# Patient Record
Sex: MALE | Race: WHITE | NOT HISPANIC OR LATINO | Employment: OTHER | URBAN - METROPOLITAN AREA
[De-identification: names, ages, dates, MRNs, and addresses within clinical notes are randomized per-mention and may not be internally consistent; named-entity substitution may affect disease eponyms.]

---

## 2021-06-25 ENCOUNTER — APPOINTMENT (OUTPATIENT)
Dept: GENERAL RADIOLOGY | Facility: HOSPITAL | Age: 81
End: 2021-06-25

## 2021-06-25 ENCOUNTER — HOSPITAL ENCOUNTER (EMERGENCY)
Facility: HOSPITAL | Age: 81
Discharge: HOME | End: 2021-06-25
Attending: EMERGENCY MEDICINE | Admitting: EMERGENCY MEDICINE

## 2021-06-25 VITALS
SYSTOLIC BLOOD PRESSURE: 168 MMHG | OXYGEN SATURATION: 100 % | DIASTOLIC BLOOD PRESSURE: 82 MMHG | WEIGHT: 191.8 LBS | HEART RATE: 71 BPM | TEMPERATURE: 97.5 F | HEIGHT: 71 IN | RESPIRATION RATE: 14 BRPM | BODY MASS INDEX: 26.85 KG/M2
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
## 2021-06-25 DIAGNOSIS — S61.213A LACERATION OF LEFT MIDDLE FINGER WITHOUT FOREIGN BODY WITHOUT DAMAGE TO NAIL, INITIAL ENCOUNTER: Primary | ICD-10-CM

## 2021-06-25 PROCEDURE — 90715 TDAP VACCINE 7 YRS/> IM: CPT | Performed by: EMERGENCY MEDICINE

## 2021-06-25 PROCEDURE — 25010000002 TDAP 5-2.5-18.5 LF-MCG/0.5 SUSPENSION: Performed by: EMERGENCY MEDICINE

## 2021-06-25 PROCEDURE — 73140 X-RAY EXAM OF FINGER(S): CPT

## 2021-06-25 PROCEDURE — 90471 IMMUNIZATION ADMIN: CPT | Performed by: EMERGENCY MEDICINE

## 2021-06-25 PROCEDURE — 99282 EMERGENCY DEPT VISIT SF MDM: CPT

## 2021-06-25 RX ADMIN — TETANUS TOXOID, REDUCED DIPHTHERIA TOXOID AND ACELLULAR PERTUSSIS VACCINE, ADSORBED 0.5 ML: 5; 2.5; 8; 8; 2.5 SUSPENSION INTRAMUSCULAR at 22:08

## 2021-12-14 ENCOUNTER — OFFICE (AMBULATORY)
Dept: URBAN - METROPOLITAN AREA PATHOLOGY 4 | Facility: PATHOLOGY | Age: 81
End: 2021-12-14
Payer: COMMERCIAL

## 2021-12-14 ENCOUNTER — ON CAMPUS - OUTPATIENT (AMBULATORY)
Dept: URBAN - METROPOLITAN AREA HOSPITAL 2 | Facility: HOSPITAL | Age: 81
End: 2021-12-14
Payer: COMMERCIAL

## 2021-12-14 VITALS
HEIGHT: 72 IN | SYSTOLIC BLOOD PRESSURE: 110 MMHG | SYSTOLIC BLOOD PRESSURE: 100 MMHG | SYSTOLIC BLOOD PRESSURE: 108 MMHG | DIASTOLIC BLOOD PRESSURE: 45 MMHG | SYSTOLIC BLOOD PRESSURE: 77 MMHG | WEIGHT: 186 LBS | TEMPERATURE: 97.6 F | SYSTOLIC BLOOD PRESSURE: 123 MMHG | RESPIRATION RATE: 16 BRPM | RESPIRATION RATE: 18 BRPM | HEART RATE: 78 BPM | SYSTOLIC BLOOD PRESSURE: 105 MMHG | SYSTOLIC BLOOD PRESSURE: 93 MMHG | SYSTOLIC BLOOD PRESSURE: 75 MMHG | DIASTOLIC BLOOD PRESSURE: 49 MMHG | SYSTOLIC BLOOD PRESSURE: 81 MMHG | DIASTOLIC BLOOD PRESSURE: 57 MMHG | HEART RATE: 55 BPM | HEART RATE: 47 BPM | SYSTOLIC BLOOD PRESSURE: 99 MMHG | DIASTOLIC BLOOD PRESSURE: 60 MMHG | DIASTOLIC BLOOD PRESSURE: 69 MMHG | DIASTOLIC BLOOD PRESSURE: 63 MMHG | HEART RATE: 49 BPM | DIASTOLIC BLOOD PRESSURE: 50 MMHG | DIASTOLIC BLOOD PRESSURE: 78 MMHG | DIASTOLIC BLOOD PRESSURE: 59 MMHG | HEART RATE: 59 BPM | OXYGEN SATURATION: 98 % | OXYGEN SATURATION: 100 % | RESPIRATION RATE: 14 BRPM | HEART RATE: 53 BPM | SYSTOLIC BLOOD PRESSURE: 102 MMHG | HEART RATE: 58 BPM | HEART RATE: 64 BPM | OXYGEN SATURATION: 99 % | DIASTOLIC BLOOD PRESSURE: 70 MMHG | DIASTOLIC BLOOD PRESSURE: 73 MMHG | HEART RATE: 81 BPM

## 2021-12-14 DIAGNOSIS — K63.5 POLYP OF COLON: ICD-10-CM

## 2021-12-14 DIAGNOSIS — D12.2 BENIGN NEOPLASM OF ASCENDING COLON: ICD-10-CM

## 2021-12-14 DIAGNOSIS — Z80.0 FAMILY HISTORY OF MALIGNANT NEOPLASM OF DIGESTIVE ORGANS: ICD-10-CM

## 2021-12-14 DIAGNOSIS — K62.1 RECTAL POLYP: ICD-10-CM

## 2021-12-14 DIAGNOSIS — K57.30 DIVERTICULOSIS OF LARGE INTESTINE WITHOUT PERFORATION OR ABS: ICD-10-CM

## 2021-12-14 LAB
GI HISTOLOGY: A. UNSPECIFIED: (no result)
GI HISTOLOGY: B. UNSPECIFIED: (no result)
GI HISTOLOGY: PDF REPORT: (no result)

## 2021-12-14 PROCEDURE — 45385 COLONOSCOPY W/LESION REMOVAL: CPT | Mod: PT | Performed by: INTERNAL MEDICINE

## 2021-12-14 PROCEDURE — 88305 TISSUE EXAM BY PATHOLOGIST: CPT | Mod: 26 | Performed by: INTERNAL MEDICINE

## 2023-02-20 ENCOUNTER — HOSPITAL ENCOUNTER (OUTPATIENT)
Dept: CARDIOLOGY | Facility: HOSPITAL | Age: 83
Discharge: HOME | End: 2023-02-20
Payer: MEDICARE

## 2023-02-20 ENCOUNTER — TRANSCRIBE ORDERS (OUTPATIENT)
Dept: ADMINISTRATIVE | Facility: HOSPITAL | Age: 83
End: 2023-02-20
Payer: MEDICARE

## 2023-02-20 ENCOUNTER — LAB (OUTPATIENT)
Dept: LAB | Facility: HOSPITAL | Age: 83
End: 2023-02-20
Payer: MEDICARE

## 2023-02-20 DIAGNOSIS — Z01.818 OTHER SPECIFIED PRE-OPERATIVE EXAMINATION: Primary | ICD-10-CM

## 2023-02-20 DIAGNOSIS — H35.372 MACULAR PUCKER, LEFT EYE: ICD-10-CM

## 2023-02-20 DIAGNOSIS — Z01.818 OTHER SPECIFIED PRE-OPERATIVE EXAMINATION: ICD-10-CM

## 2023-02-20 LAB
ANION GAP SERPL CALCULATED.3IONS-SCNC: 8.8 MMOL/L (ref 5–15)
BASOPHILS # BLD AUTO: 0.04 10*3/MM3 (ref 0–0.2)
BASOPHILS NFR BLD AUTO: 0.7 % (ref 0–1.5)
BUN SERPL-MCNC: 21 MG/DL (ref 8–23)
BUN/CREAT SERPL: 17.1 (ref 7–25)
CALCIUM SPEC-SCNC: 9.6 MG/DL (ref 8.6–10.5)
CHLORIDE SERPL-SCNC: 106 MMOL/L (ref 98–107)
CO2 SERPL-SCNC: 25.2 MMOL/L (ref 22–29)
CREAT SERPL-MCNC: 1.23 MG/DL (ref 0.76–1.27)
DEPRECATED RDW RBC AUTO: 41.6 FL (ref 37–54)
EGFRCR SERPLBLD CKD-EPI 2021: 59.3 ML/MIN/1.73
EOSINOPHIL # BLD AUTO: 0.12 10*3/MM3 (ref 0–0.4)
EOSINOPHIL NFR BLD AUTO: 2.1 % (ref 0.3–6.2)
ERYTHROCYTE [DISTWIDTH] IN BLOOD BY AUTOMATED COUNT: 12.7 % (ref 12.3–15.4)
GLUCOSE SERPL-MCNC: 99 MG/DL (ref 65–99)
HCT VFR BLD AUTO: 45.4 % (ref 37.5–51)
HGB BLD-MCNC: 15.6 G/DL (ref 13–17.7)
IMM GRANULOCYTES # BLD AUTO: 0.02 10*3/MM3 (ref 0–0.05)
IMM GRANULOCYTES NFR BLD AUTO: 0.4 % (ref 0–0.5)
LYMPHOCYTES # BLD AUTO: 1.34 10*3/MM3 (ref 0.7–3.1)
LYMPHOCYTES NFR BLD AUTO: 23.7 % (ref 19.6–45.3)
MCH RBC QN AUTO: 31.2 PG (ref 26.6–33)
MCHC RBC AUTO-ENTMCNC: 34.4 G/DL (ref 31.5–35.7)
MCV RBC AUTO: 90.8 FL (ref 79–97)
MONOCYTES # BLD AUTO: 0.5 10*3/MM3 (ref 0.1–0.9)
MONOCYTES NFR BLD AUTO: 8.8 % (ref 5–12)
NEUTROPHILS NFR BLD AUTO: 3.63 10*3/MM3 (ref 1.7–7)
NEUTROPHILS NFR BLD AUTO: 64.3 % (ref 42.7–76)
NRBC BLD AUTO-RTO: 0 /100 WBC (ref 0–0.2)
PLATELET # BLD AUTO: 237 10*3/MM3 (ref 140–450)
PMV BLD AUTO: 10.8 FL (ref 6–12)
POTASSIUM SERPL-SCNC: 4.3 MMOL/L (ref 3.5–5.2)
RBC # BLD AUTO: 5 10*6/MM3 (ref 4.14–5.8)
SODIUM SERPL-SCNC: 140 MMOL/L (ref 136–145)
WBC NRBC COR # BLD: 5.65 10*3/MM3 (ref 3.4–10.8)

## 2023-02-20 PROCEDURE — 93010 ELECTROCARDIOGRAM REPORT: CPT | Performed by: INTERNAL MEDICINE

## 2023-02-20 PROCEDURE — 36415 COLL VENOUS BLD VENIPUNCTURE: CPT

## 2023-02-20 PROCEDURE — 80048 BASIC METABOLIC PNL TOTAL CA: CPT

## 2023-02-20 PROCEDURE — 93005 ELECTROCARDIOGRAM TRACING: CPT | Performed by: OPHTHALMOLOGY

## 2023-02-20 PROCEDURE — 85025 COMPLETE CBC W/AUTO DIFF WBC: CPT

## 2023-02-21 LAB — QT INTERVAL: 395 MS

## 2023-04-26 ENCOUNTER — TRANSCRIBE ORDERS (OUTPATIENT)
Dept: ADMINISTRATIVE | Facility: HOSPITAL | Age: 83
End: 2023-04-26
Payer: MEDICARE

## 2023-04-26 ENCOUNTER — LAB (OUTPATIENT)
Dept: LAB | Facility: HOSPITAL | Age: 83
End: 2023-04-26
Payer: MEDICARE

## 2023-04-26 DIAGNOSIS — R79.89 DECREASED FREE TESTOSTERONE LEVEL IN MALE: Primary | ICD-10-CM

## 2023-04-26 DIAGNOSIS — R79.89 DECREASED FREE TESTOSTERONE LEVEL IN MALE: ICD-10-CM

## 2023-04-26 PROCEDURE — 84402 ASSAY OF FREE TESTOSTERONE: CPT

## 2023-04-26 PROCEDURE — 84403 ASSAY OF TOTAL TESTOSTERONE: CPT

## 2023-04-26 PROCEDURE — 36415 COLL VENOUS BLD VENIPUNCTURE: CPT

## 2023-04-27 LAB
TESTOST FREE SERPL-MCNC: 6.5 PG/ML (ref 6.6–18.1)
TESTOST SERPL-MCNC: 840 NG/DL (ref 264–916)

## 2023-06-22 ENCOUNTER — OFFICE (AMBULATORY)
Dept: URBAN - METROPOLITAN AREA CLINIC 64 | Facility: CLINIC | Age: 83
End: 2023-06-22
Payer: COMMERCIAL

## 2023-06-22 VITALS
SYSTOLIC BLOOD PRESSURE: 134 MMHG | HEART RATE: 53 BPM | HEIGHT: 72 IN | WEIGHT: 197 LBS | DIASTOLIC BLOOD PRESSURE: 72 MMHG

## 2023-06-22 DIAGNOSIS — R19.7 DIARRHEA, UNSPECIFIED: ICD-10-CM

## 2023-06-22 PROCEDURE — 99213 OFFICE O/P EST LOW 20 MIN: CPT | Performed by: NURSE PRACTITIONER

## 2023-07-11 ENCOUNTER — OFFICE (AMBULATORY)
Dept: URBAN - METROPOLITAN AREA CLINIC 64 | Facility: CLINIC | Age: 83
End: 2023-07-11
Payer: COMMERCIAL

## 2023-07-11 VITALS
HEIGHT: 72 IN | HEART RATE: 41 BPM | WEIGHT: 202.2 LBS | SYSTOLIC BLOOD PRESSURE: 112 MMHG | DIASTOLIC BLOOD PRESSURE: 58 MMHG

## 2023-07-11 DIAGNOSIS — R19.7 DIARRHEA, UNSPECIFIED: ICD-10-CM

## 2023-07-11 PROCEDURE — 99213 OFFICE O/P EST LOW 20 MIN: CPT | Performed by: NURSE PRACTITIONER

## 2023-07-11 RX ORDER — DICYCLOMINE HYDROCHLORIDE 20 MG/1
80 TABLET ORAL
Qty: 360 | Refills: 3 | Status: ACTIVE
Start: 2023-07-11

## 2025-01-17 ENCOUNTER — PRE-ADMISSION TESTING (OUTPATIENT)
Dept: PREADMISSION TESTING | Facility: HOSPITAL | Age: 85
End: 2025-01-17
Payer: MEDICARE

## 2025-01-17 ENCOUNTER — HOSPITAL ENCOUNTER (OUTPATIENT)
Dept: GENERAL RADIOLOGY | Facility: HOSPITAL | Age: 85
Discharge: HOME | End: 2025-01-17
Payer: MEDICARE

## 2025-01-17 VITALS
WEIGHT: 192.1 LBS | HEIGHT: 71 IN | SYSTOLIC BLOOD PRESSURE: 114 MMHG | TEMPERATURE: 97.7 F | OXYGEN SATURATION: 96 % | HEART RATE: 50 BPM | RESPIRATION RATE: 16 BRPM | BODY MASS INDEX: 26.9 KG/M2 | DIASTOLIC BLOOD PRESSURE: 66 MMHG

## 2025-01-17 LAB
ALBUMIN SERPL-MCNC: 3.9 G/DL (ref 3.5–5.2)
ALBUMIN/GLOB SERPL: 1.5 G/DL
ALP SERPL-CCNC: 75 U/L (ref 39–117)
ALT SERPL W P-5'-P-CCNC: 25 U/L (ref 1–41)
ANION GAP SERPL CALCULATED.3IONS-SCNC: 7.8 MMOL/L (ref 5–15)
ANION GAP SERPL CALCULATED.3IONS-SCNC: 9.7 MMOL/L (ref 5–15)
AST SERPL-CCNC: 24 U/L (ref 1–40)
BILIRUB SERPL-MCNC: 0.5 MG/DL (ref 0–1.2)
BUN SERPL-MCNC: 20 MG/DL (ref 8–23)
BUN SERPL-MCNC: 20 MG/DL (ref 8–23)
BUN/CREAT SERPL: 15.3 (ref 7–25)
BUN/CREAT SERPL: 18.5 (ref 7–25)
CALCIUM SPEC-SCNC: 9.3 MG/DL (ref 8.6–10.5)
CALCIUM SPEC-SCNC: 9.5 MG/DL (ref 8.6–10.5)
CHLORIDE SERPL-SCNC: 104 MMOL/L (ref 98–107)
CHLORIDE SERPL-SCNC: 105 MMOL/L (ref 98–107)
CO2 SERPL-SCNC: 27.3 MMOL/L (ref 22–29)
CO2 SERPL-SCNC: 29.2 MMOL/L (ref 22–29)
CREAT SERPL-MCNC: 1.08 MG/DL (ref 0.76–1.27)
CREAT SERPL-MCNC: 1.31 MG/DL (ref 0.76–1.27)
CRP SERPL-MCNC: <0.3 MG/DL (ref 0–0.5)
DEPRECATED RDW RBC AUTO: 40.4 FL (ref 37–54)
EGFRCR SERPLBLD CKD-EPI 2021: 54.3 ML/MIN/1.73
EGFRCR SERPLBLD CKD-EPI 2021: 68.5 ML/MIN/1.73
ERYTHROCYTE [DISTWIDTH] IN BLOOD BY AUTOMATED COUNT: 12.3 % (ref 12.3–15.4)
ERYTHROCYTE [SEDIMENTATION RATE] IN BLOOD: <1 MM/HR (ref 0–20)
GLOBULIN UR ELPH-MCNC: 2.6 GM/DL
GLUCOSE SERPL-MCNC: 107 MG/DL (ref 65–99)
GLUCOSE SERPL-MCNC: 112 MG/DL (ref 65–99)
HBA1C MFR BLD: 5.5 % (ref 4.8–5.6)
HCT VFR BLD AUTO: 44 % (ref 37.5–51)
HGB BLD-MCNC: 15.6 G/DL (ref 13–17.7)
INR PPP: 1.03 (ref 0.9–1.1)
MCH RBC QN AUTO: 32 PG (ref 26.6–33)
MCHC RBC AUTO-ENTMCNC: 35.5 G/DL (ref 31.5–35.7)
MCV RBC AUTO: 90.3 FL (ref 79–97)
PLATELET # BLD AUTO: 204 10*3/MM3 (ref 140–450)
PMV BLD AUTO: 10.1 FL (ref 6–12)
POTASSIUM SERPL-SCNC: 4.1 MMOL/L (ref 3.5–5.2)
POTASSIUM SERPL-SCNC: 4.6 MMOL/L (ref 3.5–5.2)
PROT SERPL-MCNC: 6.5 G/DL (ref 6–8.5)
PROTHROMBIN TIME: 13.8 SECONDS (ref 11.7–14.2)
QT INTERVAL: 453 MS
QTC INTERVAL: 401 MS
RBC # BLD AUTO: 4.87 10*6/MM3 (ref 4.14–5.8)
SODIUM SERPL-SCNC: 141 MMOL/L (ref 136–145)
SODIUM SERPL-SCNC: 142 MMOL/L (ref 136–145)
WBC NRBC COR # BLD AUTO: 4.14 10*3/MM3 (ref 3.4–10.8)

## 2025-01-17 PROCEDURE — 85610 PROTHROMBIN TIME: CPT

## 2025-01-17 PROCEDURE — 93005 ELECTROCARDIOGRAM TRACING: CPT

## 2025-01-17 PROCEDURE — 36415 COLL VENOUS BLD VENIPUNCTURE: CPT

## 2025-01-17 PROCEDURE — 85652 RBC SED RATE AUTOMATED: CPT

## 2025-01-17 PROCEDURE — 80053 COMPREHEN METABOLIC PANEL: CPT

## 2025-01-17 PROCEDURE — 93010 ELECTROCARDIOGRAM REPORT: CPT | Performed by: INTERNAL MEDICINE

## 2025-01-17 PROCEDURE — 83036 HEMOGLOBIN GLYCOSYLATED A1C: CPT

## 2025-01-17 PROCEDURE — 85027 COMPLETE CBC AUTOMATED: CPT

## 2025-01-17 PROCEDURE — 86140 C-REACTIVE PROTEIN: CPT

## 2025-01-17 PROCEDURE — 73560 X-RAY EXAM OF KNEE 1 OR 2: CPT

## 2025-01-17 RX ORDER — IBUPROFEN 400 MG/1
400 TABLET, FILM COATED ORAL EVERY 6 HOURS PRN
COMMUNITY

## 2025-01-17 RX ORDER — TAMSULOSIN HYDROCHLORIDE 0.4 MG/1
1 CAPSULE ORAL EVERY EVENING
COMMUNITY

## 2025-01-17 RX ORDER — CHLORAL HYDRATE 500 MG
1000 CAPSULE ORAL
COMMUNITY

## 2025-01-17 RX ORDER — DICYCLOMINE HCL 20 MG
20 TABLET ORAL 3 TIMES DAILY
COMMUNITY

## 2025-01-17 RX ORDER — LISINOPRIL AND HYDROCHLOROTHIAZIDE 10; 12.5 MG/1; MG/1
1 TABLET ORAL DAILY
COMMUNITY

## 2025-01-17 NOTE — PAT
Patient scheduled for a left total knee revision poly exchange with Dr. Bruce.  His original surgery was 16 years ago.  He has no hx of CAD.  He denies CP, SOA or syncope.  His heart rate on his EKG is n47.  He says this is not new and he has been told it's related to his Beta Blocker.  He has no sx from this.  He is active as he can be but limited due to the pain from his left knee.  He has not had issues with anesthesia in the past.  He has watched the total joint video.  He has many questions about how Dr. Bruce came to the conclusion a poly exchange was what he needed.  I encouraged him to call the office and notified Shannan at Dr. Bruce's office about his questions.    Cardiac:  RRR.  Pulm:  Clear to ausc.  Resp even and unlabored.  PAT labs acceptable.  Prelim EKG Sinus Bradycardia  X-ray pending.    1/20/25  Knee x-ray and EKG acceptable.

## 2025-01-17 NOTE — DISCHARGE INSTRUCTIONS
Take the following medications the morning of surgery:  GABAPENTIN      If you are on prescription narcotic pain medication to control your pain you may also take that medication the morning of surgery.      General Instructions:     Do not eat solid food after midnight the night before surgery.  Clear liquids day of surgery are allowed but must be stopped at least two hours before your hospital arrival time.       Allowed clear liquids      Water, sodas, and tea or coffee with no cream or milk added.       12 to 20 ounces of a clear liquid that contains carbohydrates is recommended.  If non-diabetic, have Gatorade or Powerade.  If diabetic, have G2 or Powerade Zero.     Do not have liquids red in color.  Do not consume chicken, beef, pork or vegetable broth or bouillon cubes of any variety as they are not considered clear liquids and are not allowed.        Patients who avoid smoking, chewing tobacco and alcohol for 4 weeks prior to surgery have a reduced risk of post-operative complications.  Quit smoking as many days before surgery as you can.  Do not smoke, use chewing tobacco or drink alcohol the day of surgery.   If applicable bring your C-PAP/ BI-PAP machine in with you to preop day of surgery.  Bring any papers given to you in the doctor’s office.  Wear clean comfortable clothes.  Do not wear contact lenses, false eyelashes or make-up.  Bring a case for your glasses.   Bring crutches or walker if applicable.  Remove all piercings.  Leave jewelry and any other valuables at home.  Hair extensions with metal clips must be removed prior to surgery.  The Pre-Admission Testing nurse will instruct you to bring medications if unable to obtain an accurate list in Pre-Admission Testing.          Preventing a Surgical Site Infection:  For 2 to 3 days before surgery, avoid shaving with a razor because the razor can irritate skin and make it easier to develop an infection.    Any areas of open skin can increase the risk  of a post-operative wound infection by allowing bacteria to enter and travel throughout the body.  Notify your surgeon if you have any skin wounds / rashes even if it is not near the expected surgical site.  The area will need assessed to determine if surgery should be delayed until it is healed.  The night prior to surgery shower using a fresh bar of anti-bacterial soap (such as Dial) and clean washcloth.  Sleep in a clean bed with clean clothing.  Do not allow pets to sleep with you.  Shower on the morning of surgery using a fresh bar of anti-bacterial soap (such as Dial) and clean washcloth.  Dry with a clean towel and dress in clean clothing.  Ask your surgeon if you will be receiving antibiotics prior to surgery.  Make sure you, your family, and all healthcare providers clean their hands with soap and water or an alcohol based hand  before caring for you or your wound.        CHLORHEXIDINE CLOTH INSTRUCTIONS  The morning of surgery follow these instructions using the Chlorhexidine cloths you've been given.  These steps reduce bacteria on the body.  Do not use the cloths near your eyes, ears mouth, genitalia or on open wounds.  Throw the cloths away after use but do not try to flush them down a toilet.      Open and remove one cloth at a time from the package.    Leave the cloth unfolded and begin the bathing.  Massage the skin with the cloths using gentle pressure to remove bacteria.  Do not scrub harshly.   Follow the steps below with one 2% CHG cloth per area (6 total cloths).  One cloth for neck, shoulders and chest.  One cloth for both arms, hands, fingers and underarms (do underarms last).  One cloth for the abdomen followed by groin.  One cloth for right leg and foot including between the toes.  One cloth for left leg and foot including between the toes.  The last cloth is to be used for the back of the neck, back and buttocks.    Allow the CHG to air dry 3 minutes on the skin which will give it  time to work and decrease the chance of irritation.  The skin may feel sticky until it is dry.  Do not rinse with water or any other liquid or you will lose the beneficial effects of the CHG.  If mild skin irritation occurs, do rinse the skin to remove the CHG.  Report this to the nurse at time of admission.  Do not apply lotions, creams, ointments, deodorants or perfumes after using the clothes. Dress in clean clothes before coming to the hospital.        Day of surgery:  Your arrival time is approximately two hours before your scheduled surgery time.  Please note if you have an early arrival time the surgery doors do not open before 5:00 AM.  Upon arrival, a Pre-op nurse and Anesthesiologist will review your health history, obtain vital signs, and answer questions you may have.  The only belongings needed at this time will be a list of your home medications and if applicable your C-PAP/BI-PAP machine.  A Pre-op nurse will start an IV and you may receive medication in preparation for surgery, including something to help you relax.     Please be aware that surgery does come with discomfort.  We want to make every effort to control your discomfort so please discuss any uncontrolled symptoms with your nurse.   Your doctor will most likely have prescribed pain medications.      If you are going home after surgery you will receive individualized written care instructions before being discharged.  A responsible adult must drive you to and from the hospital on the day of your surgery and ideally stay with you through the night.   .  Discharge prescriptions can be filled by the hospital pharmacy during regular pharmacy hours.  If you are having surgery late in the day/evening your prescription may be e-prescribed to your pharmacy.  Please verify your pharmacy hours or chose a 24 hour pharmacy to avoid not having access to your prescription because your pharmacy has closed for the day.    If you are staying overnight  following surgery, you will be transported to your hospital room following the recovery period.  [facility] has all private rooms.    If you have any questions please call Pre-Admission Testing at [phone].  Deductibles and co-payments are collected on the day of service. Please be prepared to pay the required co-pay, deductible or deposit on the day of service as defined by your plan.    Call your surgeon immediately if you experience any of the following symptoms:  Sore Throat  Shortness of Breath or difficulty breathing  Cough  Chills  Body soreness or muscle pain  Headache  Fever  New loss of taste or smell  Do not arrive for your surgery ill.  Your procedure will need to be rescheduled to another time.  You will need to call your physician before the day of surgery to avoid any unnecessary exposure to hospital staff as well as other patients.

## 2025-01-29 ENCOUNTER — ANESTHESIA (OUTPATIENT)
Dept: PERIOP | Facility: HOSPITAL | Age: 85
End: 2025-01-29
Payer: MEDICARE

## 2025-01-29 ENCOUNTER — ANESTHESIA EVENT (OUTPATIENT)
Dept: PERIOP | Facility: HOSPITAL | Age: 85
End: 2025-01-29
Payer: MEDICARE

## 2025-01-29 ENCOUNTER — APPOINTMENT (OUTPATIENT)
Dept: GENERAL RADIOLOGY | Facility: HOSPITAL | Age: 85
End: 2025-01-29
Payer: MEDICARE

## 2025-01-29 ENCOUNTER — HOSPITAL ENCOUNTER (INPATIENT)
Facility: HOSPITAL | Age: 85
LOS: 1 days | Discharge: HOME | End: 2025-01-30
Attending: ORTHOPAEDIC SURGERY | Admitting: ORTHOPAEDIC SURGERY
Payer: MEDICARE

## 2025-01-29 DIAGNOSIS — Z96.652 S/P REVISION OF TOTAL KNEE, LEFT: Primary | ICD-10-CM

## 2025-01-29 DIAGNOSIS — Z96.652 STATUS POST TOTAL KNEE REPLACEMENT, LEFT: ICD-10-CM

## 2025-01-29 LAB
ABO GROUP BLD: NORMAL
BLD GP AB SCN SERPL QL: NEGATIVE
RH BLD: POSITIVE
T&S EXPIRATION DATE: NORMAL

## 2025-01-29 PROCEDURE — 25010000002 PROPOFOL 200 MG/20ML EMULSION: Performed by: NURSE ANESTHETIST, CERTIFIED REGISTERED

## 2025-01-29 PROCEDURE — C1776 JOINT DEVICE (IMPLANTABLE): HCPCS | Performed by: ORTHOPAEDIC SURGERY

## 2025-01-29 PROCEDURE — 25010000002 ROPIVACAINE PER 1 MG: Performed by: ANESTHESIOLOGY

## 2025-01-29 PROCEDURE — 25010000002 BUPIVACAINE PF 0.75 % SOLUTION: Performed by: ANESTHESIOLOGY

## 2025-01-29 PROCEDURE — 86850 RBC ANTIBODY SCREEN: CPT | Performed by: ORTHOPAEDIC SURGERY

## 2025-01-29 PROCEDURE — 87075 CULTR BACTERIA EXCEPT BLOOD: CPT | Performed by: ORTHOPAEDIC SURGERY

## 2025-01-29 PROCEDURE — C1713 ANCHOR/SCREW BN/BN,TIS/BN: HCPCS | Performed by: ORTHOPAEDIC SURGERY

## 2025-01-29 PROCEDURE — 87070 CULTURE OTHR SPECIMN AEROBIC: CPT | Performed by: ORTHOPAEDIC SURGERY

## 2025-01-29 PROCEDURE — 25010000002 DEXAMETHASONE PER 1 MG: Performed by: ANESTHESIOLOGY

## 2025-01-29 PROCEDURE — 73560 X-RAY EXAM OF KNEE 1 OR 2: CPT

## 2025-01-29 PROCEDURE — 25010000002 ONDANSETRON PER 1 MG: Performed by: NURSE ANESTHETIST, CERTIFIED REGISTERED

## 2025-01-29 PROCEDURE — 87015 SPECIMEN INFECT AGNT CONCNTJ: CPT | Performed by: ORTHOPAEDIC SURGERY

## 2025-01-29 PROCEDURE — 25810000003 LACTATED RINGERS PER 1000 ML: Performed by: ANESTHESIOLOGY

## 2025-01-29 PROCEDURE — 86901 BLOOD TYPING SEROLOGIC RH(D): CPT | Performed by: ORTHOPAEDIC SURGERY

## 2025-01-29 PROCEDURE — 0SPD09Z REMOVAL OF LINER FROM LEFT KNEE JOINT, OPEN APPROACH: ICD-10-PCS | Performed by: ORTHOPAEDIC SURGERY

## 2025-01-29 PROCEDURE — 25010000002 PROPOFOL 10 MG/ML EMULSION: Performed by: NURSE ANESTHETIST, CERTIFIED REGISTERED

## 2025-01-29 PROCEDURE — 25010000002 LIDOCAINE PF 2% 2 % SOLUTION: Performed by: NURSE ANESTHETIST, CERTIFIED REGISTERED

## 2025-01-29 PROCEDURE — 25010000002 CEFAZOLIN PER 500 MG: Performed by: ORTHOPAEDIC SURGERY

## 2025-01-29 PROCEDURE — 0SUW09Z SUPPLEMENT LEFT KNEE JOINT, TIBIAL SURFACE WITH LINER, OPEN APPROACH: ICD-10-PCS | Performed by: ORTHOPAEDIC SURGERY

## 2025-01-29 PROCEDURE — 87205 SMEAR GRAM STAIN: CPT | Performed by: ORTHOPAEDIC SURGERY

## 2025-01-29 PROCEDURE — 25010000002 FENTANYL CITRATE (PF) 50 MCG/ML SOLUTION: Performed by: ANESTHESIOLOGY

## 2025-01-29 PROCEDURE — 86900 BLOOD TYPING SEROLOGIC ABO: CPT | Performed by: ORTHOPAEDIC SURGERY

## 2025-01-29 DEVICE — IMPLANTABLE DEVICE
Type: IMPLANTABLE DEVICE | Site: KNEE | Status: FUNCTIONAL
Brand: BIOMET® KNEE SYSTEM

## 2025-01-29 DEVICE — IMPLANTABLE DEVICE
Type: IMPLANTABLE DEVICE | Site: KNEE | Status: FUNCTIONAL
Brand: VANGUARD® KNEE SYSTEM

## 2025-01-29 DEVICE — VIOLET ANTIBACTERIAL POLYDIOXANONE, KNOTLESS TISSUE CONTROL DEVICE
Type: IMPLANTABLE DEVICE | Site: KNEE | Status: FUNCTIONAL
Brand: STRATAFIX

## 2025-01-29 DEVICE — DEV CONTRL TISS STRATAFIX SPIRAL MNCRYL UD 3/0 PLS 30CM: Type: IMPLANTABLE DEVICE | Site: KNEE | Status: FUNCTIONAL

## 2025-01-29 RX ORDER — HYDROCHLOROTHIAZIDE 12.5 MG/1
12.5 TABLET ORAL
Status: DISCONTINUED | OUTPATIENT
Start: 2025-01-29 | End: 2025-01-30 | Stop reason: HOSPADM

## 2025-01-29 RX ORDER — HYDROMORPHONE HYDROCHLORIDE 1 MG/ML
0.5 INJECTION, SOLUTION INTRAMUSCULAR; INTRAVENOUS; SUBCUTANEOUS
Status: DISCONTINUED | OUTPATIENT
Start: 2025-01-29 | End: 2025-01-30 | Stop reason: HOSPADM

## 2025-01-29 RX ORDER — MIDAZOLAM HYDROCHLORIDE 1 MG/ML
0.5 INJECTION, SOLUTION INTRAMUSCULAR; INTRAVENOUS
Status: DISCONTINUED | OUTPATIENT
Start: 2025-01-29 | End: 2025-01-29 | Stop reason: HOSPADM

## 2025-01-29 RX ORDER — ATROPINE SULFATE 0.4 MG/ML
0.4 INJECTION, SOLUTION INTRAMUSCULAR; INTRAVENOUS; SUBCUTANEOUS ONCE AS NEEDED
Status: DISCONTINUED | OUTPATIENT
Start: 2025-01-29 | End: 2025-01-29 | Stop reason: HOSPADM

## 2025-01-29 RX ORDER — PROPOFOL 10 MG/ML
INJECTION, EMULSION INTRAVENOUS AS NEEDED
Status: DISCONTINUED | OUTPATIENT
Start: 2025-01-29 | End: 2025-01-29 | Stop reason: SURG

## 2025-01-29 RX ORDER — HYDROCODONE BITARTRATE AND ACETAMINOPHEN 7.5; 325 MG/1; MG/1
2 TABLET ORAL EVERY 4 HOURS PRN
Status: DISCONTINUED | OUTPATIENT
Start: 2025-01-29 | End: 2025-01-30 | Stop reason: HOSPADM

## 2025-01-29 RX ORDER — NALOXONE HCL 0.4 MG/ML
0.1 VIAL (ML) INJECTION
Status: DISCONTINUED | OUTPATIENT
Start: 2025-01-29 | End: 2025-01-30 | Stop reason: HOSPADM

## 2025-01-29 RX ORDER — ROPIVACAINE HYDROCHLORIDE 5 MG/ML
INJECTION, SOLUTION EPIDURAL; INFILTRATION; PERINEURAL
Status: COMPLETED | OUTPATIENT
Start: 2025-01-29 | End: 2025-01-29

## 2025-01-29 RX ORDER — ONDANSETRON 2 MG/ML
4 INJECTION INTRAMUSCULAR; INTRAVENOUS EVERY 6 HOURS PRN
Status: DISCONTINUED | OUTPATIENT
Start: 2025-01-29 | End: 2025-01-30 | Stop reason: HOSPADM

## 2025-01-29 RX ORDER — ASPIRIN 81 MG/1
81 TABLET ORAL EVERY 12 HOURS SCHEDULED
Status: DISCONTINUED | OUTPATIENT
Start: 2025-01-29 | End: 2025-01-30 | Stop reason: HOSPADM

## 2025-01-29 RX ORDER — GABAPENTIN 300 MG/1
300 CAPSULE ORAL 3 TIMES DAILY
Status: DISCONTINUED | OUTPATIENT
Start: 2025-01-29 | End: 2025-01-29

## 2025-01-29 RX ORDER — KETOROLAC TROMETHAMINE 15 MG/ML
15 INJECTION, SOLUTION INTRAMUSCULAR; INTRAVENOUS EVERY 6 HOURS
Status: DISCONTINUED | OUTPATIENT
Start: 2025-01-29 | End: 2025-01-30 | Stop reason: HOSPADM

## 2025-01-29 RX ORDER — HYDROCODONE BITARTRATE AND ACETAMINOPHEN 5; 325 MG/1; MG/1
1 TABLET ORAL ONCE AS NEEDED
Status: DISCONTINUED | OUTPATIENT
Start: 2025-01-29 | End: 2025-01-29 | Stop reason: HOSPADM

## 2025-01-29 RX ORDER — LABETALOL HYDROCHLORIDE 5 MG/ML
5 INJECTION, SOLUTION INTRAVENOUS
Status: DISCONTINUED | OUTPATIENT
Start: 2025-01-29 | End: 2025-01-29 | Stop reason: HOSPADM

## 2025-01-29 RX ORDER — HYDROCODONE BITARTRATE AND ACETAMINOPHEN 7.5; 325 MG/1; MG/1
1 TABLET ORAL EVERY 4 HOURS PRN
Status: DISCONTINUED | OUTPATIENT
Start: 2025-01-29 | End: 2025-01-29 | Stop reason: HOSPADM

## 2025-01-29 RX ORDER — HYDROCODONE BITARTRATE AND ACETAMINOPHEN 7.5; 325 MG/1; MG/1
1 TABLET ORAL EVERY 4 HOURS PRN
Status: DISCONTINUED | OUTPATIENT
Start: 2025-01-29 | End: 2025-01-30 | Stop reason: HOSPADM

## 2025-01-29 RX ORDER — CELECOXIB 200 MG/1
400 CAPSULE ORAL ONCE
Status: COMPLETED | OUTPATIENT
Start: 2025-01-29 | End: 2025-01-29

## 2025-01-29 RX ORDER — LISINOPRIL 10 MG/1
10 TABLET ORAL
Status: DISCONTINUED | OUTPATIENT
Start: 2025-01-30 | End: 2025-01-30 | Stop reason: HOSPADM

## 2025-01-29 RX ORDER — BUPIVACAINE HYDROCHLORIDE 7.5 MG/ML
INJECTION, SOLUTION EPIDURAL; RETROBULBAR
Status: COMPLETED | OUTPATIENT
Start: 2025-01-29 | End: 2025-01-29

## 2025-01-29 RX ORDER — HYDRALAZINE HYDROCHLORIDE 20 MG/ML
5 INJECTION INTRAMUSCULAR; INTRAVENOUS
Status: DISCONTINUED | OUTPATIENT
Start: 2025-01-29 | End: 2025-01-29 | Stop reason: HOSPADM

## 2025-01-29 RX ORDER — DICYCLOMINE HYDROCHLORIDE 10 MG/1
10 CAPSULE ORAL
Status: DISCONTINUED | OUTPATIENT
Start: 2025-01-29 | End: 2025-01-30 | Stop reason: HOSPADM

## 2025-01-29 RX ORDER — SODIUM CHLORIDE 0.9 % (FLUSH) 0.9 %
3 SYRINGE (ML) INJECTION EVERY 12 HOURS SCHEDULED
Status: DISCONTINUED | OUTPATIENT
Start: 2025-01-29 | End: 2025-01-29 | Stop reason: HOSPADM

## 2025-01-29 RX ORDER — FAMOTIDINE 10 MG/ML
20 INJECTION, SOLUTION INTRAVENOUS ONCE
Status: COMPLETED | OUTPATIENT
Start: 2025-01-29 | End: 2025-01-29

## 2025-01-29 RX ORDER — DOCUSATE SODIUM 100 MG/1
100 CAPSULE, LIQUID FILLED ORAL 2 TIMES DAILY PRN
Status: DISCONTINUED | OUTPATIENT
Start: 2025-01-29 | End: 2025-01-30 | Stop reason: HOSPADM

## 2025-01-29 RX ORDER — ONDANSETRON 4 MG/1
4 TABLET, ORALLY DISINTEGRATING ORAL EVERY 6 HOURS PRN
Status: DISCONTINUED | OUTPATIENT
Start: 2025-01-29 | End: 2025-01-30 | Stop reason: HOSPADM

## 2025-01-29 RX ORDER — ONDANSETRON 2 MG/ML
4 INJECTION INTRAMUSCULAR; INTRAVENOUS ONCE AS NEEDED
Status: DISCONTINUED | OUTPATIENT
Start: 2025-01-29 | End: 2025-01-29 | Stop reason: HOSPADM

## 2025-01-29 RX ORDER — DICYCLOMINE HYDROCHLORIDE 10 MG/1
10 CAPSULE ORAL 3 TIMES DAILY
Status: DISCONTINUED | OUTPATIENT
Start: 2025-01-29 | End: 2025-01-29

## 2025-01-29 RX ORDER — DICYCLOMINE HYDROCHLORIDE 10 MG/1
20 CAPSULE ORAL NIGHTLY
Status: DISCONTINUED | OUTPATIENT
Start: 2025-01-29 | End: 2025-01-30 | Stop reason: HOSPADM

## 2025-01-29 RX ORDER — SODIUM CHLORIDE 9 MG/ML
100 INJECTION, SOLUTION INTRAVENOUS CONTINUOUS
Status: DISCONTINUED | OUTPATIENT
Start: 2025-01-29 | End: 2025-01-30 | Stop reason: HOSPADM

## 2025-01-29 RX ORDER — EPHEDRINE SULFATE 50 MG/ML
5 INJECTION, SOLUTION INTRAVENOUS ONCE AS NEEDED
Status: DISCONTINUED | OUTPATIENT
Start: 2025-01-29 | End: 2025-01-29 | Stop reason: HOSPADM

## 2025-01-29 RX ORDER — BUPIVACAINE HYDROCHLORIDE 7.5 MG/ML
INJECTION, SOLUTION EPIDURAL; RETROBULBAR AS NEEDED
Status: DISCONTINUED | OUTPATIENT
Start: 2025-01-29 | End: 2025-01-29

## 2025-01-29 RX ORDER — EPHEDRINE SULFATE 50 MG/ML
INJECTION INTRAVENOUS AS NEEDED
Status: DISCONTINUED | OUTPATIENT
Start: 2025-01-29 | End: 2025-01-29 | Stop reason: SURG

## 2025-01-29 RX ORDER — IPRATROPIUM BROMIDE AND ALBUTEROL SULFATE 2.5; .5 MG/3ML; MG/3ML
3 SOLUTION RESPIRATORY (INHALATION) ONCE AS NEEDED
Status: DISCONTINUED | OUTPATIENT
Start: 2025-01-29 | End: 2025-01-29 | Stop reason: HOSPADM

## 2025-01-29 RX ORDER — FENTANYL CITRATE 50 UG/ML
50 INJECTION, SOLUTION INTRAMUSCULAR; INTRAVENOUS
Status: DISCONTINUED | OUTPATIENT
Start: 2025-01-29 | End: 2025-01-29 | Stop reason: HOSPADM

## 2025-01-29 RX ORDER — DEXAMETHASONE SODIUM PHOSPHATE 4 MG/ML
INJECTION, SOLUTION INTRA-ARTICULAR; INTRALESIONAL; INTRAMUSCULAR; INTRAVENOUS; SOFT TISSUE AS NEEDED
Status: DISCONTINUED | OUTPATIENT
Start: 2025-01-29 | End: 2025-01-29 | Stop reason: SURG

## 2025-01-29 RX ORDER — LIDOCAINE HYDROCHLORIDE 20 MG/ML
INJECTION, SOLUTION EPIDURAL; INFILTRATION; INTRACAUDAL; PERINEURAL AS NEEDED
Status: DISCONTINUED | OUTPATIENT
Start: 2025-01-29 | End: 2025-01-29 | Stop reason: SURG

## 2025-01-29 RX ORDER — METOPROLOL SUCCINATE 25 MG/1
25 TABLET, EXTENDED RELEASE ORAL NIGHTLY
Status: DISCONTINUED | OUTPATIENT
Start: 2025-01-29 | End: 2025-01-30 | Stop reason: HOSPADM

## 2025-01-29 RX ORDER — DIPHENHYDRAMINE HYDROCHLORIDE 50 MG/ML
12.5 INJECTION INTRAMUSCULAR; INTRAVENOUS
Status: DISCONTINUED | OUTPATIENT
Start: 2025-01-29 | End: 2025-01-29 | Stop reason: HOSPADM

## 2025-01-29 RX ORDER — FLUMAZENIL 0.1 MG/ML
0.2 INJECTION INTRAVENOUS AS NEEDED
Status: DISCONTINUED | OUTPATIENT
Start: 2025-01-29 | End: 2025-01-29 | Stop reason: HOSPADM

## 2025-01-29 RX ORDER — MAGNESIUM HYDROXIDE 1200 MG/15ML
LIQUID ORAL AS NEEDED
Status: DISCONTINUED | OUTPATIENT
Start: 2025-01-29 | End: 2025-01-29 | Stop reason: HOSPADM

## 2025-01-29 RX ORDER — SODIUM CHLORIDE, SODIUM LACTATE, POTASSIUM CHLORIDE, CALCIUM CHLORIDE 600; 310; 30; 20 MG/100ML; MG/100ML; MG/100ML; MG/100ML
9 INJECTION, SOLUTION INTRAVENOUS CONTINUOUS
Status: DISCONTINUED | OUTPATIENT
Start: 2025-01-29 | End: 2025-01-29

## 2025-01-29 RX ORDER — PROMETHAZINE HYDROCHLORIDE 25 MG/1
25 SUPPOSITORY RECTAL ONCE AS NEEDED
Status: DISCONTINUED | OUTPATIENT
Start: 2025-01-29 | End: 2025-01-29 | Stop reason: HOSPADM

## 2025-01-29 RX ORDER — ACETAMINOPHEN 500 MG
1000 TABLET ORAL ONCE
Status: COMPLETED | OUTPATIENT
Start: 2025-01-29 | End: 2025-01-29

## 2025-01-29 RX ORDER — NALOXONE HCL 0.4 MG/ML
0.2 VIAL (ML) INJECTION AS NEEDED
Status: DISCONTINUED | OUTPATIENT
Start: 2025-01-29 | End: 2025-01-29 | Stop reason: HOSPADM

## 2025-01-29 RX ORDER — ONDANSETRON 2 MG/ML
INJECTION INTRAMUSCULAR; INTRAVENOUS AS NEEDED
Status: DISCONTINUED | OUTPATIENT
Start: 2025-01-29 | End: 2025-01-29 | Stop reason: SURG

## 2025-01-29 RX ORDER — TRAMADOL HYDROCHLORIDE 50 MG/1
50 TABLET ORAL EVERY 8 HOURS PRN
Status: DISCONTINUED | OUTPATIENT
Start: 2025-01-29 | End: 2025-01-30 | Stop reason: HOSPADM

## 2025-01-29 RX ORDER — PROMETHAZINE HYDROCHLORIDE 25 MG/1
25 TABLET ORAL ONCE AS NEEDED
Status: DISCONTINUED | OUTPATIENT
Start: 2025-01-29 | End: 2025-01-29 | Stop reason: HOSPADM

## 2025-01-29 RX ORDER — DEXAMETHASONE SODIUM PHOSPHATE 4 MG/ML
INJECTION, SOLUTION INTRA-ARTICULAR; INTRALESIONAL; INTRAMUSCULAR; INTRAVENOUS; SOFT TISSUE
Status: COMPLETED | OUTPATIENT
Start: 2025-01-29 | End: 2025-01-29

## 2025-01-29 RX ORDER — FENTANYL CITRATE 50 UG/ML
25 INJECTION, SOLUTION INTRAMUSCULAR; INTRAVENOUS
Status: DISCONTINUED | OUTPATIENT
Start: 2025-01-29 | End: 2025-01-29 | Stop reason: HOSPADM

## 2025-01-29 RX ORDER — SODIUM CHLORIDE 0.9 % (FLUSH) 0.9 %
3-10 SYRINGE (ML) INJECTION AS NEEDED
Status: DISCONTINUED | OUTPATIENT
Start: 2025-01-29 | End: 2025-01-29 | Stop reason: HOSPADM

## 2025-01-29 RX ORDER — HYDROMORPHONE HYDROCHLORIDE 1 MG/ML
0.25 INJECTION, SOLUTION INTRAMUSCULAR; INTRAVENOUS; SUBCUTANEOUS
Status: DISCONTINUED | OUTPATIENT
Start: 2025-01-29 | End: 2025-01-29 | Stop reason: HOSPADM

## 2025-01-29 RX ORDER — GABAPENTIN 300 MG/1
600 CAPSULE ORAL 3 TIMES DAILY
Status: DISCONTINUED | OUTPATIENT
Start: 2025-01-29 | End: 2025-01-30 | Stop reason: HOSPADM

## 2025-01-29 RX ORDER — TAMSULOSIN HYDROCHLORIDE 0.4 MG/1
0.4 CAPSULE ORAL EVERY EVENING
Status: DISCONTINUED | OUTPATIENT
Start: 2025-01-29 | End: 2025-01-30 | Stop reason: HOSPADM

## 2025-01-29 RX ORDER — TRANEXAMIC ACID 100 MG/ML
INJECTION, SOLUTION INTRAVENOUS AS NEEDED
Status: DISCONTINUED | OUTPATIENT
Start: 2025-01-29 | End: 2025-01-29 | Stop reason: SURG

## 2025-01-29 RX ORDER — MELOXICAM 15 MG/1
15 TABLET ORAL DAILY
Status: DISCONTINUED | OUTPATIENT
Start: 2025-01-30 | End: 2025-01-30 | Stop reason: HOSPADM

## 2025-01-29 RX ORDER — PREGABALIN 75 MG/1
75 CAPSULE ORAL ONCE
Status: COMPLETED | OUTPATIENT
Start: 2025-01-29 | End: 2025-01-29

## 2025-01-29 RX ADMIN — FAMOTIDINE 20 MG: 10 INJECTION INTRAVENOUS at 10:01

## 2025-01-29 RX ADMIN — EPHEDRINE SULFATE 10 MG: 50 INJECTION INTRAVENOUS at 12:49

## 2025-01-29 RX ADMIN — CELECOXIB 400 MG: 200 CAPSULE ORAL at 09:39

## 2025-01-29 RX ADMIN — TRANEXAMIC ACID 1000 MG: 100 INJECTION, SOLUTION INTRAVENOUS at 12:13

## 2025-01-29 RX ADMIN — LIDOCAINE HYDROCHLORIDE 60 MG: 20 INJECTION, SOLUTION EPIDURAL; INFILTRATION; INTRACAUDAL; PERINEURAL at 12:08

## 2025-01-29 RX ADMIN — PROPOFOL 100 MCG/KG/MIN: 10 INJECTION, EMULSION INTRAVENOUS at 12:08

## 2025-01-29 RX ADMIN — GABAPENTIN 300 MG: 300 CAPSULE ORAL at 16:47

## 2025-01-29 RX ADMIN — DEXAMETHASONE SODIUM PHOSPHATE 4 MG: 4 INJECTION, SOLUTION INTRA-ARTICULAR; INTRALESIONAL; INTRAMUSCULAR; INTRAVENOUS; SOFT TISSUE at 09:48

## 2025-01-29 RX ADMIN — SODIUM CHLORIDE, POTASSIUM CHLORIDE, SODIUM LACTATE AND CALCIUM CHLORIDE 9 ML/HR: 600; 310; 30; 20 INJECTION, SOLUTION INTRAVENOUS at 09:42

## 2025-01-29 RX ADMIN — DEXAMETHASONE SODIUM PHOSPHATE 4 MG: 4 INJECTION, SOLUTION INTRA-ARTICULAR; INTRALESIONAL; INTRAMUSCULAR; INTRAVENOUS; SOFT TISSUE at 12:15

## 2025-01-29 RX ADMIN — Medication 3 ML: at 09:42

## 2025-01-29 RX ADMIN — ROPIVACAINE HYDROCHLORIDE 20 ML: 5 INJECTION EPIDURAL; INFILTRATION; PERINEURAL at 09:48

## 2025-01-29 RX ADMIN — EPHEDRINE SULFATE 10 MG: 50 INJECTION INTRAVENOUS at 12:14

## 2025-01-29 RX ADMIN — TAMSULOSIN HYDROCHLORIDE 0.4 MG: 0.4 CAPSULE ORAL at 16:46

## 2025-01-29 RX ADMIN — DICYCLOMINE HYDROCHLORIDE 10 MG: 10 CAPSULE ORAL at 16:46

## 2025-01-29 RX ADMIN — BUPIVACAINE HYDROCHLORIDE 1.4 ML: 7.5 INJECTION, SOLUTION EPIDURAL; RETROBULBAR at 12:03

## 2025-01-29 RX ADMIN — METOPROLOL SUCCINATE 25 MG: 25 TABLET, EXTENDED RELEASE ORAL at 21:13

## 2025-01-29 RX ADMIN — EPHEDRINE SULFATE 10 MG: 50 INJECTION INTRAVENOUS at 12:44

## 2025-01-29 RX ADMIN — SODIUM CHLORIDE 2 G: 900 INJECTION INTRAVENOUS at 21:05

## 2025-01-29 RX ADMIN — ONDANSETRON 4 MG: 2 INJECTION INTRAMUSCULAR; INTRAVENOUS at 12:15

## 2025-01-29 RX ADMIN — ACETAMINOPHEN 1000 MG: 500 TABLET, FILM COATED ORAL at 09:39

## 2025-01-29 RX ADMIN — PREGABALIN 75 MG: 75 CAPSULE ORAL at 09:39

## 2025-01-29 RX ADMIN — HYDROCHLOROTHIAZIDE 12.5 MG: 12.5 TABLET ORAL at 21:21

## 2025-01-29 RX ADMIN — DICYCLOMINE HYDROCHLORIDE 20 MG: 10 CAPSULE ORAL at 21:13

## 2025-01-29 RX ADMIN — TRANEXAMIC ACID 1000 MG: 100 INJECTION, SOLUTION INTRAVENOUS at 12:44

## 2025-01-29 RX ADMIN — PROPOFOL 80 MG: 10 INJECTION, EMULSION INTRAVENOUS at 12:08

## 2025-01-29 RX ADMIN — CEFAZOLIN 2000 MG: 2 INJECTION, POWDER, FOR SOLUTION INTRAMUSCULAR; INTRAVENOUS at 11:49

## 2025-01-29 RX ADMIN — FENTANYL CITRATE 50 MCG: 50 INJECTION, SOLUTION INTRAMUSCULAR; INTRAVENOUS at 09:48

## 2025-01-29 RX ADMIN — GABAPENTIN 600 MG: 300 CAPSULE ORAL at 21:13

## 2025-01-29 NOTE — OP NOTE
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   Post-op Diagnosis: Same  Procedure: Revision left total knee arthroplasty single component CPT code 24217  Surgeon:  Wilmer Bruce MD  Assistant: TASHI Heck MD  Orthopaedic Surgeon    [facility]  [phone]        Anesthesia: Spinal with Block, Anesthesiologist: Ricky Peter MD  CRNA: Nida Minaya CRNA  Staff: Circulator: Parisa Lucero RN; Sergey Mills RN  Scrub Person: Olga Lidia Workman  Vendor Representative: Josh Simental Peyton  Assistant: Destiny Funk PA-C CFA  Estimated Blood Loss: minimal  Specimens:   Order Name Source Comment Collection Info Order Time   ANAEROBIC CULTURE Knee, Left  Collected By: Wilmer Bruce MD 1/29/2025 12:39 PM     Release to patient   Routine Release        ANAEROBIC CULTURE Knee, Left  Collected By: Wilmer Bruce MD 1/29/2025 12:39 PM     Release to patient   Routine Release        WOUND CULTURE Knee, Left  Collected By: Wilmer Bruce MD 1/29/2025 12:39 PM     Release to patient   Routine Release        WOUND CULTURE Knee, Left  Collected By: Wilmer Bruce MD 1/29/2025 12:39 PM     Release to patient   Routine Release        TYPE AND SCREEN Arm, Right  Collected By: Tri Mata RN 1/29/2025  9:23 AM     Release to patient   Routine Release          Drains: none  Complications: None    Components Utilized:    Implant Name Type Inv. Item Serial No.  Lot No. LRB No. Used Action   DEV CONTRL TISS STRATAFIX SPIRAL MNCRYL UD 3/0 PLS 30CM - [id number] Implant DEV CONTRL TISS STRATAFIX SPIRAL MNCRYL UD 3/0 PLS 30CM  [employer]  DIV OF J [employer] J [id number] Left 1 Implanted   DEV CONTRL TISS STRATAFIX SYMM PDS PLUS JULI CT-1 45CM - [id number]  Implant DEV CONTRL TISS STRATAFIX SYMM PDS PLUS JULI CT-1 45CM  [employer]  DIV OF J AND J [id number] Left 1 Implanted   BAR TIB ASCENT/MAXIM AMADEO INTERLOK - [id number] Implant BAR TIB ASCENT/MAXIM AMADEO INTERLOK  [employer] [id number] Left 1 Implanted   BEAR TIB/KN VANGUARD CR LIP 12X79/83MM NS - [id number] Implant BEAR TIB/KN VANGUARD CR LIP 12X79/83MM NS  [employer] [id number] Left 1 Implanted       Indication for Procedure:  This patient is a 84 y.o. male who has a history of remote left total knee arthroplasty he developed symptoms of instability.  He had evidence of polyethylene wear.  We discussed the possibility of polyethylene exchange surgical options and non-surgical options were discussed in detail and to the patient's satisfaction.  Surgical intervention was recommended based on the patient's injury and functional status.      The risks and benefits of surgery were discussed with patient and informed consent was obtained.  Risks include but are not limited to, infection, bleeding, nerve injury, blood clots, risks associated with anesthesia, need for further surgery, persistent pain, and possibly death.    Protocols for intravenous antibiotics and venous thrombosis were followed for this patient.  IV antibiotics were infused prior to surgery and will be discontinued within 24 hours of completion of the surgical procedure.       DESCRIPTION OF PROCEDURE:     The patient was seen in Preoperative Holding Area where their surgical site was marked. Preoperative antibiotics were received. H&P and consent updated. They were taken to the Operating Room and provided general anesthesia on the Operating Room table.  A well-padded nonsterile tourniquet was placed on the left proximal thigh.  The extremities prepped and draped in a two-stage fashion using alcohol followed by ChloraPrep.  A formal surgical timeout confirmed the correct patient, procedure preformed laterality as well as he received cefazolin and tranexamic acid  within 60 minutes of incision.  All members of the team were in agreement.  Next an Esmarch was used to exsanguinate the extremity tourniquet was applied to 50 mmHg.  I then used a 20 blade scalpel to sharply incise the skin to [place] any tissues in line with the prior incision.  Ethibond sutures were encountered from the previous pegboard lateral arthrotomy.  A medial parapatellar arthrotomy was performed.  I then went ahead and neisha 2 cultures per routine and there was no evidence of purulence.  A synovectomy was performed as there was significant heterotopic fibrous tissue both in the medial and lateral gutters as well as retropatellar space which was reestablished.  I then remove the bar engaging the polyethylene insert and remove the insert which was a size 10 which had evidence of significant delamination posterior medially.  The knee was irrigated using a saline Betadine solution.  I then trialed a size 10 mm CR lip followed by a 12.  The 12 had easy terminal extension and significant improved varus valgus stability.  No sagittal plane laxity no medial lift off.  I went ahead and opened a definitive size 12 mm CR lipped insert which was engaged and then the bar mechanism was locked.  I confirmed that the insert was appropriately engaged.  The wound was once again irrigated.  The tourniquet was deflated local periarticular injection was placed throughout the knee.  The arthrotomy is then closed using #1 strata fix barbed suture followed by 0 Vicryl suture 2-0 Vicryl suture and then a Monocryl.  Skin glue was applied followed by sterile silver impregnated occlusive dressing and Ace wrap.  All counts were correct at the end the procedure    Postoperative Plan:  Weightbearing as tolerated to the left lower extremity range of motion as tolerated    SCDs aspirin 81 mg twice daily for DVT prophylaxis cefazolin for postoperative antibiotic prophylaxis        Wilmer Bruce MD

## 2025-01-29 NOTE — PLAN OF CARE
Goal Outcome Evaluation:  Plan of Care Reviewed With: patient        Progress: no change  Outcome Evaluation: Patient A&Ox4. POD0 of L total knee revision. Unable to ambulate r/t block; LLE numb. Tolerating PO intake. Hopeful to d/c tomorrow if ambulating, has a walker at home.          Able to stand at EOB, didn't feel comfortable taking steps yet.

## 2025-01-29 NOTE — ANESTHESIA POSTPROCEDURE EVALUATION
"Patient: Juan Marley    Procedure Summary       Date: 01/29/25 Room / Location:  [facility] [address] [place] [address] [facility]    Anesthesia Start: 1154 Anesthesia Stop: 1330    Procedure: LEFT  TOTAL KNEE ARTHROPLASTY REVISION POLYETHYLENE EXCHANGE (Left: Knee) Diagnosis:     Surgeons: Wilmer Bruce MD Provider: Ricky Peter MD    Anesthesia Type: spinal, MAC ASA Status: 2            Anesthesia Type: spinal, MAC    Vitals  Vitals Value Taken Time   /76 01/29/25 1400   Temp 36.3 °C (97.4 °F) 01/29/25 1327   Pulse 60 01/29/25 1411   Resp 16 01/29/25 1345   SpO2 99 % 01/29/25 1411   Vitals shown include unfiled device data.        Post Anesthesia Care and Evaluation    Patient location during evaluation: bedside  Patient participation: complete - patient participated  Level of consciousness: awake  Pain management: adequate    Airway patency: patent  Anesthetic complications: No anesthetic complications    Cardiovascular status: acceptable  Respiratory status: acceptable  Hydration status: acceptable    Comments: */66   Pulse 62   Temp 36.3 °C (97.4 °F) (Oral)   Resp 16   Ht 180.3 cm (70.98\")   Wt 87.1 kg (192 lb 0.3 oz)   SpO2 98%   BMI 26.79 kg/m²       "

## 2025-01-29 NOTE — ANESTHESIA PROCEDURE NOTES
Peripheral Block      Patient reassessed immediately prior to procedure    Patient location during procedure: pre-op  Start time: 1/29/2025 9:45 AM  Stop time: 1/29/2025 9:48 AM  Reason for block: at surgeon's request and post-op pain management  Performed by  Anesthesiologist: Ricky Peter MD  Preanesthetic Checklist  Completed: patient identified, IV checked, site marked, risks and benefits discussed, surgical consent, monitors and equipment checked, pre-op evaluation and timeout performed  Prep:  Pt Position: supine  Sterile barriers:cap, gloves, mask, alcohol skin prep and washed/disinfected hands  Prep: ChloraPrep  Patient monitoring: blood pressure monitoring, continuous pulse oximetry and EKG  Procedure    Sedation: yes    Guidance:ultrasound guided    ULTRASOUND INTERPRETATION.  Using ultrasound guidance a 21 G gauge needle was placed in close proximity to the femoral nerve, at which point, under ultrasound guidance anesthetic was injected in the area of the nerve and spread of the anesthesia was seen on ultrasound in close proximity thereto.  There were no abnormalities seen on ultrasound; a digital image was taken; and the patient tolerated the procedure with no complications. Images:still images obtained, printed/placed on chart    Laterality:left  Block Type:adductor canal block (Femoral Nerve at Adductor Canal)  Injection Technique:single-shot  Needle Type:short-bevel  Needle Gauge:21 G  Loss of resistance: normal.    Medications Used: dexamethasone (DECADRON) injection - Injection   4 mg - 1/29/2025 9:48:00 AM  ropivacaine (NAROPIN) 0.5 % injection - Injection   20 mL - 1/29/2025 9:48:00 AM      Medications  Comment:Ultrasound Interpretation:  Ultrasound guidance utilized for visualization of needle approach to femoral artery/nerve at adductor canal level and verification of local anesthetic disbursement to surrounding tissues. Photo printed and placed on chart for reference.    Post  Assessment  Injection Assessment: negative aspiration for heme, no paresthesia on injection and incremental injection  Patient Tolerance:comfortable throughout block  Complications:no  Performed by: Ricky Peter MD

## 2025-01-29 NOTE — H&P
ORTHOPEDIC SURGERY PRE-OP HISTORY AND PHYSICAL      Patient: Juan Marley  Date of Admission: 1/29/2025  8:40 AM  YOB: 1940  Medical Record Number: [id number]  Attending Physician: Wilmer Bruce MD  Consulting Physician: Wilmer Bruce MD    CHIEF COMPLAINT: Left Knee Pain.    HISTORY OF PRESENT ILLNESS: Patient is a 84 y.o. male presents to [facility] with above complaints.  The patient failed conservative treatment and patient requested surgical intervention.  Patient presents for revision left total knee arthroplasty polyethylene exchange for instability.  He has remote total knee arthroplasty performed by another physician.  ALLERGIES: No Known Allergies    HOME MEDICATIONS:  Medications Prior to Admission   Medication Sig Dispense Refill Last Dose/Taking    Collagen-Vitamin C-Biotin (COLLAGEN PO) Take  by mouth. POWDER SUPPLEMENT DAILY-HOLD FOR SURGERY   1/22/2025 Morning    dicyclomine (BENTYL) 20 MG tablet Take 1 tablet by mouth 3 (Three) Times a Day. TAKES ONE IN THE MORNING, ONE IN THE EVENING, AND 2 AT BEDTIME   1/29/2025 at  7:00 AM    gabapentin (NEURONTIN) 300 MG capsule Take 1 capsule by mouth 3 (Three) Times a Day.   1/29/2025 at  7:00 AM    lisinopril-hydrochlorothiazide (PRINZIDE,ZESTORETIC) 10-12.5 MG per tablet Take 1 tablet by mouth Daily.   1/29/2025 at  7:00 AM    metoprolol succinate XL (TOPROL-XL) 25 MG 24 hr tablet Take 1 tablet by mouth Every Night.   1/28/2025 at 10:30 PM    primidone (MYSOLINE) 50 MG tablet Take 1 tablet by mouth Every Night. TAKES 2 NIGHTLY   1/28/2025 at 10:30 PM    tamsulosin (FLOMAX) 0.4 MG capsule 24 hr capsule Take 1 capsule by mouth Every Evening.   1/28/2025 at  6:00 PM    B Complex Vitamins (Vitamin-B Complex) tablet Take 1 tablet by mouth Daily. HOLD FOR SURGERY   1/22/2025    ibuprofen (ADVIL,MOTRIN) 400 MG tablet Take 1 tablet by mouth Every 6 (Six) Hours As Needed for Mild Pain. HOLD FOR SURGERY   1/22/2025    Norphlet-3  Fatty Acids (fish oil) 1000 MG capsule capsule Take 1 capsule by mouth Daily With Breakfast. START HOLDING FOR SURGERY   2025    Specialty Vitamins Products (NERVIVE NERVE RELIEF PO) Take 1 tablet by mouth Daily. HOLD FOR SURGERY   2025    tadalafil (CIALIS) 20 MG tablet Take 1 tablet by mouth Every Evening. HOLD FOR 48 HOURS PRIOR- TAKES FOR ED   2025       Past Medical History:   Diagnosis Date    Enlarged prostate     Essential tremor     BOTH HANDS    Hypertension     Knee pain, left     Rotator cuff tear, left     Wears dentures      Past Surgical History:   Procedure Laterality Date    CATARACT EXTRACTION, BILATERAL      COLONOSCOPY      ELBOW OLECRANNON BURSA EXCISION Right     X2    ENDOSCOPY      SPINE SURGERY  2015    SPINAL STENOSIS    SPINE SURGERY  2016    RUPTURED DISC    TONSILLECTOMY AND ADENOIDECTOMY      AGES 20S    TOTAL KNEE ARTHROPLASTY Left 2008    VASECTOMY       Social History     Occupational History    Not on file   Tobacco Use    Smoking status: Former     Current packs/day: 0.00     Types: Cigarettes     Quit date:      Years since quittin.1    Smokeless tobacco: Never   Vaping Use    Vaping status: Never Used   Substance and Sexual Activity    Alcohol use: Yes     Comment: OCCASIONAL    Drug use: Never    Sexual activity: Defer      Social History     Social History Narrative    Not on file     Family History   Problem Relation Age of Onset    Malig Hyperthermia Neg Hx        REVIEW OF SYSTEMS:    HEENT: Patient denies any headaches, vision changes, change in hearing, or tinnitus, Patient denies epistaxis, sinus pain, hoarseness, or dysphagia   Pulmonary: Patient denies any cough, congestion, acute change in SOA or wheezing.   Cardiovascular: Patient denies any change in chest pain, dyspnea, palpitations, weakness, intolerance of exercise, varicosities, change in murmur   Gastrointestinal:  Patient denies change in appetite, melena, change in bowel  "habits.   Genital/Urinary: Patient denies dysuria, change in color of urine, change in frequency of urination, pain with urgency, change in incontinence, retention.   Musculoskeletal: Patient denies complaints of acute changes in symptoms of other joints not mentioned above.   Neurological: Patient denies changes in dizziness, tremor, ataxia, or difficulty in speaking or changes in memory.   Endocrine system: Patient denies acute changes in tremors, palpitations, polyuria, polydipsia, polyphagia, diaphoresis, exophthalmos, or goiter.   Psychological: Patient denies thoughts/plans of harming self or other; denies acute changes in depression,  insomnia, night terrors, kandi, disorientation.   Skin: Patient denies any bruising, rashes, discoloration, pruritus,or wounds not mentioned in history of present illness or chief complaint above.   Hematopoietic: Patient denies current bleeding, epistaxis, hematuria, or melena.    PHYSICAL EXAM:   Vitals:  Vitals:    01/29/25 0908 01/29/25 0950 01/29/25 1000   BP: 138/67 131/66 117/95   BP Location: Left arm     Patient Position: Sitting     Pulse: 52 (!) 48 50   Resp: 16     Temp: 97.4 °F (36.3 °C)     TempSrc: Oral     SpO2: 98% 97% 99%   Weight: 87.1 kg (192 lb 0.3 oz)     Height: 180.3 cm (70.98\")         General:  84 y.o. male who appears about stated age.    Alert, cooperative, in no acute distress                       Head:    Normocephalic, without obvious abnormality, atraumatic   Eyes:            Lids and lashes normal, conjunctivae and sclerae normal, no         icterus, no pallor, corneas clear, PERRLA   Ears:    Ears appear intact with no abnormalities noted   Throat:   No oral lesions, no thrush, oral mucosa moist   Neck:   No adenopathy, supple, trachea midline, no JVD   Back:     Limited exam shows no severe kyphosis present,no visible           erythema, no excessive  tenderness to palpation.    Lungs:     Respirations regular, even and unlabored.     Heart:   "  Normal rate, Pulses palpable   Chest Wall:    No abnormalities observed.   Abdomen:     Normal bowel sounds, no masses, no organomegaly, soft              non-tender, non-distended, no guarding, no rebound                      tenderness   Rectal:     Deferred   Pulses:   Pulses palpable and equal bilaterally   Skin:   No bleeding, bruising or rash   Lymph nodes:   No palpable adenopathy   Extremities:     Left Knee: Skin intact.  Painful ROM.  NVI distally no effusion.  There is varus valgus laxity..      DIAGNOSTIC TEST:  No visits with results within 2 Day(s) from this visit.   Latest known visit with results is:   Pre-Admission Testing on 01/17/2025   Component Date Value Ref Range Status    Glucose 01/17/2025 112 (H)  65 - 99 mg/dL Final    BUN 01/17/2025 20  8 - 23 mg/dL Final    Creatinine 01/17/2025 1.08  0.76 - 1.27 mg/dL Final    Sodium 01/17/2025 142  136 - 145 mmol/L Final    Potassium 01/17/2025 4.1  3.5 - 5.2 mmol/L Final    Chloride 01/17/2025 105  98 - 107 mmol/L Final    CO2 01/17/2025 27.3  22.0 - 29.0 mmol/L Final    Calcium 01/17/2025 9.3  8.6 - 10.5 mg/dL Final    BUN/Creatinine Ratio 01/17/2025 18.5  7.0 - 25.0 Final    Anion Gap 01/17/2025 9.7  5.0 - 15.0 mmol/L Final    eGFR 01/17/2025 68.5  >60.0 mL/min/1.73 Final    WBC 01/17/2025 4.14  3.40 - 10.80 10*3/mm3 Final    RBC 01/17/2025 4.87  4.14 - 5.80 10*6/mm3 Final    Hemoglobin 01/17/2025 15.6  13.0 - 17.7 g/dL Final    Hematocrit 01/17/2025 44.0  37.5 - 51.0 % Final    MCV 01/17/2025 90.3  79.0 - 97.0 fL Final    MCH 01/17/2025 32.0  26.6 - 33.0 pg Final    MCHC 01/17/2025 35.5  31.5 - 35.7 g/dL Final    RDW 01/17/2025 12.3  12.3 - 15.4 % Final    RDW-SD 01/17/2025 40.4  37.0 - 54.0 fl Final    MPV 01/17/2025 10.1  6.0 - 12.0 fL Final    Platelets 01/17/2025 204  140 - 450 10*3/mm3 Final    QT Interval 01/17/2025 453  ms Final    QTC Interval 01/17/2025 401  ms Final    Glucose 01/17/2025 107 (H)  65 - 99 mg/dL Final    BUN 01/17/2025 20   8 - 23 mg/dL Final    Creatinine 01/17/2025 1.31 (H)  0.76 - 1.27 mg/dL Final    Sodium 01/17/2025 141  136 - 145 mmol/L Final    Potassium 01/17/2025 4.6  3.5 - 5.2 mmol/L Final    Chloride 01/17/2025 104  98 - 107 mmol/L Final    CO2 01/17/2025 29.2 (H)  22.0 - 29.0 mmol/L Final    Calcium 01/17/2025 9.5  8.6 - 10.5 mg/dL Final    Total Protein 01/17/2025 6.5  6.0 - 8.5 g/dL Final    Albumin 01/17/2025 3.9  3.5 - 5.2 g/dL Final    ALT (SGPT) 01/17/2025 25  1 - 41 U/L Final    AST (SGOT) 01/17/2025 24  1 - 40 U/L Final    Alkaline Phosphatase 01/17/2025 75  39 - 117 U/L Final    Total Bilirubin 01/17/2025 0.5  0.0 - 1.2 mg/dL Final    Globulin 01/17/2025 2.6  gm/dL Final    A/G Ratio 01/17/2025 1.5  g/dL Final    BUN/Creatinine Ratio 01/17/2025 15.3  7.0 - 25.0 Final    Anion Gap 01/17/2025 7.8  5.0 - 15.0 mmol/L Final    eGFR 01/17/2025 54.3 (L)  >60.0 mL/min/1.73 Final    Hemoglobin A1C 01/17/2025 5.50  4.80 - 5.60 % Final    Protime 01/17/2025 13.8  11.7 - 14.2 Seconds Final    INR 01/17/2025 1.03  0.90 - 1.10 Final    Sed Rate 01/17/2025 <1  0 - 20 mm/hr Final    C-Reactive Protein 01/17/2025 <0.30  0.00 - 0.50 mg/dL Final       No results found.      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 *      PLAN:    Left knee polyethylene exchange    Risks and benefits of surgical intervention were discussed in detail with the patient.  Risks of infection, fracture, dislocation, extremity length discrepancy, neurovascular injury, persistent pain, medical risks, anesthetic risk, need for additional surgery, deep venous thrombosis, pulmonary embolism and death.      The above diagnosis and treatment plan was discussed with the patient and/or family.  They were educated in both non-surgical and surgical treatment options for their condition.   They were given the opportunity to ask questions and were answered to their satisfaction.  They agreed to  proceed with the above treatment plan.        Wilmer Bruce MD  Date: 1/29/2025

## 2025-01-29 NOTE — ANESTHESIA PROCEDURE NOTES
Spinal Block    Pre-sedation assessment completed: 1/29/2025 11:58 AM    Patient reassessed immediately prior to procedure    Patient location during procedure: OR  Start Time: 1/29/2025 11:58 AM  Stop Time: 1/29/2025 12:03 PM  Indication:at surgeon's request  Performed By  Anesthesiologist: Ricky Peter MD CRNA/CAA: Nida Minaya CRNA  Preanesthetic Checklist  Completed: patient identified, IV checked, site marked, risks and benefits discussed, surgical consent, monitors and equipment checked, pre-op evaluation and timeout performed  Spinal Block Prep:  Patient Position:sitting  Sterile Tech:cap, gloves, mask and sterile barriers  Prep:Chloraprep  Patient Monitoring:blood pressure monitoring and continuous pulse oximetry    Spinal Block Procedure  Approach:midline  Guidance:landmark technique  Location:L4-L5  Needle Type:Pencan  Needle Gauge:25 G  Placement of Spinal needle event:cerebrospinal fluid aspirated  Paresthesia: no  Fluid Appearance:clear  Medications: bupivacaine PF (MARCAINE) 0.75 % injection - Spinal   1.4 mL - 1/29/2025 12:03:00 PM   Post Assessment  Patient Tolerance:patient tolerated the procedure well with no apparent complications  Complications no  Additional Notes  Easy placement. Positive CSF, positive swirl, negative heme.

## 2025-01-29 NOTE — ANESTHESIA PREPROCEDURE EVALUATION
Anesthesia Evaluation     no history of anesthetic complications:   NPO Solid Status: > 8 hours  NPO Liquid Status: > 2 hours           Airway   Mallampati: II  Neck ROM: full  no difficulty expected  Dental - normal exam     Pulmonary - normal exam   (+) a smoker Former,  (-) COPD, asthma, sleep apnea    PE comment: nonlabored  Cardiovascular - normal exam  Exercise tolerance: good (4-7 METS)    Rhythm: regular  Rate: normal    (+) hypertension  (-) valvular problems/murmurs, past MI, CAD, dysrhythmias, angina      Neuro/Psych  (+) tremors  (-) seizures, TIA, CVA  GI/Hepatic/Renal/Endo - negative ROS   (-) GERD, liver disease, no renal disease, diabetes, no thyroid disorder    Musculoskeletal (-) negative ROS    Abdominal    Substance History      OB/GYN          Other        ROS/Med Hx Other: BPH              Anesthesia Plan    ASA 2     spinal and MAC     (SAB with MAC. Discussed R/B/A including possibility of GA if needed )  intravenous induction     Anesthetic plan, risks, benefits, and alternatives have been provided, discussed and informed consent has been obtained with: patient.    CODE STATUS:

## 2025-01-30 VITALS
HEART RATE: 61 BPM | RESPIRATION RATE: 16 BRPM | OXYGEN SATURATION: 97 % | WEIGHT: 192.02 LBS | TEMPERATURE: 97.7 F | DIASTOLIC BLOOD PRESSURE: 75 MMHG | SYSTOLIC BLOOD PRESSURE: 111 MMHG | BODY MASS INDEX: 26.88 KG/M2 | HEIGHT: 71 IN

## 2025-01-30 PROCEDURE — 97162 PT EVAL MOD COMPLEX 30 MIN: CPT

## 2025-01-30 PROCEDURE — 25010000002 CEFAZOLIN PER 500 MG: Performed by: ORTHOPAEDIC SURGERY

## 2025-01-30 PROCEDURE — 97110 THERAPEUTIC EXERCISES: CPT

## 2025-01-30 RX ORDER — ASPIRIN 81 MG/1
81 TABLET ORAL 2 TIMES DAILY
Qty: 60 TABLET | Refills: 0 | Status: SHIPPED | OUTPATIENT
Start: 2025-01-30 | End: 2025-03-01

## 2025-01-30 RX ORDER — MELOXICAM 15 MG/1
15 TABLET ORAL DAILY
Qty: 30 TABLET | Refills: 0 | Status: SHIPPED | OUTPATIENT
Start: 2025-01-30

## 2025-01-30 RX ORDER — HYDROCODONE BITARTRATE AND ACETAMINOPHEN 7.5; 325 MG/1; MG/1
1 TABLET ORAL EVERY 6 HOURS PRN
Qty: 30 TABLET | Refills: 0 | Status: SHIPPED | OUTPATIENT
Start: 2025-01-30

## 2025-01-30 RX ADMIN — HYDROCODONE BITARTRATE AND ACETAMINOPHEN 1 TABLET: 7.5; 325 TABLET ORAL at 08:05

## 2025-01-30 RX ADMIN — SODIUM CHLORIDE 2 G: 900 INJECTION INTRAVENOUS at 04:48

## 2025-01-30 RX ADMIN — MELOXICAM 15 MG: 15 TABLET ORAL at 08:05

## 2025-01-30 RX ADMIN — GABAPENTIN 600 MG: 300 CAPSULE ORAL at 08:05

## 2025-01-30 RX ADMIN — HYDROCHLOROTHIAZIDE 12.5 MG: 12.5 TABLET ORAL at 08:05

## 2025-01-30 RX ADMIN — DICYCLOMINE HYDROCHLORIDE 10 MG: 10 CAPSULE ORAL at 08:07

## 2025-01-30 RX ADMIN — LISINOPRIL 10 MG: 10 TABLET ORAL at 08:05

## 2025-01-30 RX ADMIN — ASPIRIN 81 MG: 81 TABLET, COATED ORAL at 08:05

## 2025-01-30 NOTE — DISCHARGE SUMMARY
Discharge Summary    Date of Admission: 1/29/2025  8:40 AM  Date of Discharge:  1/30/2025    Discharge Diagnosis:   S/P revision of total knee, left [Z96.652]      PMHX:   Past Medical History:   Diagnosis Date    Enlarged prostate     Essential tremor     BOTH HANDS    Hypertension     Knee pain, left     Rotator cuff tear, left     Wears dentures        Discharge Disposition  Home or Self Care    Procedures Performed  Procedure(s):  LEFT  TOTAL KNEE ARTHROPLASTY REVISION POLYETHYLENE EXCHANGE       Indication for Admission  Patient is a 84 y.o. male admitted after undergoing the above surgical procedure. They were admitted for post-operative pain control, medical management and physical therapy.  They progressed with physical therapy.    They were deemed stable for discharge.      Consults:   Consults       No orders found for last 30 day(s).            Discharge Instructions:  Patient is weight bearing as tolerated on the operative leg.  Patient is to progress range of motion and ambulation as tolerated.  Use walker as needed for stability and gait.  May progress to cane as tolerated.  The dressing should be left on knee until post-operative day 7, and then removed.  Dressing is waterproof. Patient may shower 3 days after the operation with the dressing in place. Replace the dressing if it becomes wet or saturated. Use compression stockings for leg swelling.  Patient will follow-up in the office in 3 weeks.  Call the office at [phone] for any questions or concerns.      Discharge Medications     Discharge Medications        New Medications        Instructions Start Date   aspirin 81 MG EC tablet   81 mg, Oral, 2 Times Daily      HYDROcodone-acetaminophen 7.5-325 MG per tablet  Commonly known as: Norco   1 tablet, Oral, Every 6 Hours PRN      meloxicam 15 MG tablet  Commonly known as: MOBIC   15 mg, Oral, Daily             Continue These Medications        Instructions Start Date   COLLAGEN PO   Take  by  mouth. POWDER SUPPLEMENT DAILY-HOLD FOR SURGERY      dicyclomine 20 MG tablet  Commonly known as: BENTYL   20 mg, 3 Times Daily      fish oil 1000 MG capsule capsule   1,000 mg, Daily With Breakfast      gabapentin 300 MG capsule  Commonly known as: NEURONTIN   Take 1 capsule by mouth 3 (Three) Times a Day.      lisinopril-hydrochlorothiazide 10-12.5 MG per tablet  Commonly known as: PRINZIDE,ZESTORETIC   1 tablet, Daily      metoprolol succinate XL 25 MG 24 hr tablet  Commonly known as: TOPROL-XL   Take 1 tablet by mouth Every Night.      NERVIVE NERVE RELIEF PO   1 tablet, Daily - RT      primidone 50 MG tablet  Commonly known as: MYSOLINE   Take 1 tablet by mouth Every Night. TAKES 2 NIGHTLY      tadalafil 20 MG tablet  Commonly known as: CIALIS   1 tablet, Every Evening      tamsulosin 0.4 MG capsule 24 hr capsule  Commonly known as: FLOMAX   1 capsule, Every Evening      Vitamin-B Complex tablet   Take 1 tablet by mouth Daily. HOLD FOR SURGERY             Stop These Medications      ibuprofen 400 MG tablet  Commonly known as: ADVIL,MOTRIN              Discharge Diet: Regular diet    Activity at Discharge: Weight bearing as tolerated, activity as tolerated    Follow-up Appointments  No future appointments.           01/30/25,  07:50 EST    Wilmer Bruce MD  Orthopaedic Surgeon    [facility]  [phone]

## 2025-01-30 NOTE — PROGRESS NOTES
Procedure(s):  LEFT  TOTAL KNEE ARTHROPLASTY REVISION POLYETHYLENE EXCHANGE     LOS: 1 day     Subjective :   Complains of no pain. He states he has been up walking and states that his knee already feels much better than it did before surgery. He is overall doing well and progressing appropriately.    Objective :    Vital signs in last 24 hours:  Vitals:    01/29/25 1650 01/29/25 2113 01/29/25 2213 01/30/25 0428   BP: 151/78 109/62 103/59 111/75   BP Location: Left arm   Left arm   Patient Position: Lying   Lying   Pulse: 66 66 62 61   Resp: 16   16   Temp: 97.5 °F (36.4 °C)  98.2 °F (36.8 °C) 97.7 °F (36.5 °C)   TempSrc: Oral   Oral   SpO2: 100%  95% 97%   Weight:       Height:           PHYSICAL EXAM:  Patient is calm, in no acute distress, awake and oriented x 3.  Dressing on the left knee is clean, dry and intact.  No signs of infection.  Swelling is appropriate in amount.  Ecchymosis is appropriate in amount.  Homans test is negative.  Patient is neurovascularly intact distally.    LABS:                  ASSESSMENT:  Status post Procedure(s):  LEFT  TOTAL KNEE ARTHROPLASTY REVISION POLYETHYLENE EXCHANGE      Plan:  Continue Physical Therapy, increase mobility and range of motion as tolerated.  Continue SCDs, Continue DVT prophylaxis.  Continue pain medications as prescribed.  Aspirin 81 mg BID after discharge.  Dispo planning for home.  Follow up in office in 3 weeks.    Destiny Funk PA-C    Date: 1/30/2025  Time: 07:42 EST    Destiny Funk PA-C

## 2025-01-30 NOTE — CASE MANAGEMENT/SOCIAL WORK
Case Management Discharge Note      Final Note: dc home         Selected Continued Care - Discharged on 1/30/2025 Admission date: 1/29/2025 - Discharge disposition: Home or Self Care      Destination    No services have been selected for the patient.                Durable Medical Equipment    No services have been selected for the patient.                Dialysis/Infusion    No services have been selected for the patient.                Home Medical Care    No services have been selected for the patient.                Therapy    No services have been selected for the patient.                Community Resources    No services have been selected for the patient.                Community & DME    No services have been selected for the patient.                    Transportation Services  Private: Car    Final Discharge Disposition Code: 01 - home or self-care

## 2025-01-30 NOTE — THERAPY EVALUATION
Patient Name: Juan Marley  : 1940    MRN: [id number]                              Today's Date: 2025       Admit Date: 2025    Visit Dx:     ICD-10-CM ICD-9-CM   1. S/P revision of total knee, left  Z96.652 V43.65   2. Status post total knee replacement, left  Z96.652 V43.65     Patient Active Problem List   Diagnosis    S/P revision of total knee, left     Past Medical History:   Diagnosis Date    Enlarged prostate     Essential tremor     BOTH HANDS    Hypertension     Knee pain, left     Rotator cuff tear, left     Wears dentures      Past Surgical History:   Procedure Laterality Date    CATARACT EXTRACTION, BILATERAL      COLONOSCOPY      ELBOW OLECRANNON BURSA EXCISION Right     X2    ENDOSCOPY      SPINE SURGERY  2015    SPINAL STENOSIS    SPINE SURGERY  2016    RUPTURED DISC    TONSILLECTOMY AND ADENOIDECTOMY      AGES 20S    TOTAL KNEE ARTHROPLASTY Left 2008    VASECTOMY        General Information       Row Name 25 1031          Physical Therapy Time and Intention    Document Type evaluation  -EM     Mode of Treatment individual therapy;physical therapy  -EM       Row Name 25 1031          General Information    Patient Profile Reviewed yes  -EM     Prior Level of Function independent:  -EM     Existing Precautions/Restrictions no known precautions/restrictions  -EM       Row Name 25 1031          Living Environment    People in Home spouse  -EM       Row Name 25 1031          Home Main Entrance    Number of Stairs, Main Entrance one  -EM       Row Name 25 1031          Stairs Within Home, Primary    Number of Stairs, Within Home, Primary none  -EM       Row Name 25 1031          Cognition    Orientation Status (Cognition) oriented x 4  -EM       Row Name 25 1031          Safety Issues/Impairments Affecting Functional Mobility    Impairments Affecting Function (Mobility) strength  -EM               User Key  (r) = Recorded By, (t) = Taken By, (c)  "= Cosigned By      Initials Name Provider Type    EM Nida Cohen PT Physical Therapist                   Mobility       Row Name 01/30/25 1031          Bed Mobility    Bed Mobility bed mobility (all) activities  -EM     All Activities, [place] (Bed Mobility) modified independence  -EM     Assistive Device (Bed Mobility) head of bed elevated  -EM       Row Name 01/30/25 1031          Sit-Stand Transfer    Sit-Stand [place] (Transfers) supervision  -EM     Assistive Device (Sit-Stand Transfers) walker, front-wheeled  -EM       Row Name 01/30/25 1031          Gait/Stairs (Locomotion)    [place] Level (Gait) standby assist  -EM     Assistive Device (Gait) walker, front-wheeled  -EM     Distance in Feet (Gait) 400  -EM     Deviations/Abnormal Patterns (Gait) gait speed decreased  -EM     [place] Level (Stairs) contact guard  -EM     Handrail Location (Stairs) both sides  -EM     Number of Steps (Stairs) 4  -EM     Ascending Technique (Stairs) step-to-step  -EM     Descending Technique (Stairs) step-to-step  -EM     Comment, (Gait/Stairs) also went up/down one step with backward approach with use of rwx to simulate entry into home. gait is slow but steady pattern, no LOB or unsteadiness noted  -EM       Row Name 01/30/25 1031          Mobility    Extremity Weight-bearing Status left lower extremity  -EM     Left Lower Extremity (Weight-bearing Status) weight-bearing as tolerated (WBAT)  -EM               User Key  (r) = Recorded By, (t) = Taken By, (c) = Cosigned By      Initials Name Provider Type    EM Nida Cohen PT Physical Therapist                   Obj/Interventions       Row Name 01/30/25 1033          Range of Motion Comprehensive    General Range of Motion no range of motion deficits identified  -EM     Comment, General Range of Motion pt demonstrates almost full ROM of L knee, pt reports he has a \"slight\" foot drop on LLE prior to sx  -EM       Row Name 01/30/25 1033    "       Strength Comprehensive (MMT)    General Manual Muscle Testing (MMT) Assessment other (see comments)  -EM     Comment, General Manual Muscle Testing (MMT) Assessment expected slight post op weakness noted in LLE  -EM       Row Name 01/30/25 1033          Motor Skills    Therapeutic Exercise other (see comments)  10 reps per TKA protocol  -EM       Row Name 01/30/25 1033          Sensory Assessment (Somatosensory)    Sensory Assessment (Somatosensory) other (see comments)  pt reports he has neuropathy  -EM               User Key  (r) = Recorded By, (t) = Taken By, (c) = Cosigned By      Initials Name Provider Type    EM Nida Cohen, PT Physical Therapist                   Goals/Plan    No documentation.                  Clinical Impression       Row Name 01/30/25 1035          Pain    Pretreatment Pain Rating 0/10 - no pain  -EM     Pain Management Interventions cold applied  -EM       Row Name 01/30/25 1035          Plan of Care Review    Plan of Care Reviewed With patient  -EM     Outcome Evaluation Pt is 85 yo male seen POD1 of L TKA poly exchange. Patient lives with spouse, independent with mobility prior to admission, has rwx at home, one step to enter. Patient has no c/o pain, performed 10 reps per TKA protcol, demonstrates good strength and ROM of LLE, performed bed mobility independently, sit to stand with SBA, ambulated 400 feet with rwx and SBA, up and down 4 steps with HRs and one step with use of rwx with CGA. Pt is safe at current functional level to return home with family assist, f/u with outpt PT.  -EM       Row Name 01/30/25 1030          Therapy Assessment/Plan (PT)    Patient/Family Therapy Goals Statement (PT) get back to normal activities without pain  -EM     Criteria for Skilled Interventions Met (PT) yes  -EM     Therapy Frequency (PT) evaluation only  -EM       Row Name 01/30/25 1032          Positioning and Restraints    Pre-Treatment Position in bed  -EM     Post Treatment  Position bathroom  -EM     Bathroom notified nsg;sitting;encouraged to call for assist  -EM               User Key  (r) = Recorded By, (t) = Taken By, (c) = Cosigned By      Initials Name Provider Type    Nida Rivera, PT Physical Therapist                   Outcome Measures       Row Name 01/30/25 1037 01/30/25 0805       How much help from another person do you currently need...    Turning from your back to your side while in flat bed without using bedrails? 4  -EM 3  -EV    Moving from lying on back to sitting on the side of a flat bed without bedrails? 4  -EM 3  -EV    Moving to and from a bed to a chair (including a wheelchair)? 4  -EM 3  -EV    Standing up from a chair using your arms (e.g., wheelchair, bedside chair)? 4  -EM 3  -EV    Climbing 3-5 steps with a railing? 3  -EM 3  -EV    To walk in hospital room? 3  -EM 3  -EV    AM-PAC 6 Clicks Score (PT) 22  -EM 18  -EV              User Key  (r) = Recorded By, (t) = Taken By, (c) = Cosigned By      Initials Name Provider Type    Nida Rivera, PT Physical Therapist    Simon Shaw RN Registered Nurse                                 Physical Therapy Education       Title: PT OT SLP Therapies (Resolved)       Topic: Physical Therapy (Resolved)       Point: Mobility training (Resolved)       Learning Progress Summary            Patient Acceptance, E, VU,DU by  at 1/30/2025 1037                      Point: Home exercise program (Resolved)       Learning Progress Summary            Patient Acceptance, E, VU,DU by  at 1/30/2025 1037                                      User Key       Initials Effective Dates Name Provider Type [facility] 06/16/21 -  Nida Cohen, PT Physical Therapist PT                  PT Recommendation and Plan     Outcome Evaluation: Pt is 83 yo male seen POD1 of L TKA poly exchange. Patient lives with spouse, independent with mobility prior to admission, has rwx at home, one step to enter.  Patient has no c/o pain, performed 10 reps per TKA protcol, demonstrates good strength and ROM of LLE, performed bed mobility independently, sit to stand with SBA, ambulated 400 feet with rwx and SBA, up and down 4 steps with HRs and one step with use of rwx with CGA. Pt is safe at current functional level to return home with family assist, f/u with outpt PT.     Time Calculation:         PT Charges       Row Name 01/30/25 1038             Time Calculation    Start Time 0855  -EM      Stop Time 0920  -EM      Time Calculation (min) 25 min  -EM      PT Received On 01/30/25  -EM         Time Calculation- PT    Total Timed Code Minutes- PT 20 minute(s)  -EM         Timed Charges    98890 - PT Therapeutic Exercise Minutes 10  -EM      87428 - Gait Training Minutes  10  -EM         Total Minutes    Timed Charges Total Minutes 20  -EM       Total Minutes 20  -EM                User Key  (r) = Recorded By, (t) = Taken By, (c) = Cosigned By      Initials Name Provider Type    EM Nida Cohen PT Physical Therapist                  Therapy Charges for Today       Code Description Service Date Service Provider Modifiers Qty    52289670028 HC PT THER PROC EA 15 MIN 1/30/2025 Nida Cohen, PT GP 1    56883619846 HC PT EVAL MOD COMPLEXITY 2 1/30/2025 Nida Cohen, PT GP 1            PT G-Codes  AM-PAC 6 Clicks Score (PT): 22  PT Discharge Summary  Anticipated Discharge Disposition (PT): home with assist, home with outpatient therapy services    Nida Cohen PT  1/30/2025

## 2025-01-30 NOTE — PLAN OF CARE
Goal Outcome Evaluation:  Plan of Care Reviewed With: patient           Outcome Evaluation: Pt is 83 yo male seen POD1 of L TKA poly exchange. Patient lives with spouse, independent with mobility prior to admission, has rwx at home, one step to enter. Patient has no c/o pain, performed 10 reps per TKA protcol, demonstrates good strength and ROM of LLE, performed bed mobility independently, sit to stand with SBA, ambulated 400 feet with rwx and SBA, up and down 4 steps with HRs and one step with use of rwx with CGA. Pt is safe at current functional level to return home with family assist, f/u with outpt PT.    Anticipated Discharge Disposition (PT): home with assist, home with outpatient therapy services

## 2025-01-30 NOTE — PLAN OF CARE
Goal Outcome Evaluation:  Plan of Care Reviewed With: patient        Progress: improving  Outcome Evaluation: VSS. Patient ambu with a walker and assistx1 around the hallway and tolerated well. Dressing is c/d/i. Denies c/o pain as of the moment. Educated on falls precaution, bp meds and monitoring. Hopeful to d/c home today. Needs attended.

## 2025-01-30 NOTE — DISCHARGE INSTRUCTIONS
Dr. Wilmer Bruce   Total Knee Replacement Discharge Instructions:  Office Phone Number: [phone]    I. ACTIVITIES:  1. Exercises:  Complete exercise program as taught by the hospital physical therapist 2 times per day.  You may wean off the walker to a cane when directed by the physical therapist.  Exercise program will be advanced by the physical therapist  During the day be up ambulating every 2 hours (while awake) for short distances  Complete the ankle pump exercises at least 10 times per hour (while awake)  Elevate legs most of the day the first week post operatively and thereafter elevate legs when in bed and for at least 30 minutes during the day. Use cold packs 20-30 minutes approximately 5 times per day. This should be done before and after completing your exercises and at any time you are experiencing pain/ stiffness in your operative extremity.      2. Activities of Daily Living:  No tub baths, hot tubs, or swimming pools for 4 weeks  The tan or skin colored dressing is on your knee is waterproof.  You may shower without covering the dressing beginning 3 days after the operation.  After 7 days you may remove the dressing.  If the dressing becomes saturated prior to day 7, it may be changed.  After dressing removal, do not scrub or rub the incision. Allow skin glue to fall off over the next few weeks.  After the dressing is removed, simply let the water run over the incision and pat dry.    II. Restrictions  Follow any movement restrictions that was discussed with you by either Dr. Bruce or the physical therapist.     Avoid kneeling on the knee that was operated on  Dr. Bruce will discuss with you when you will be able to drive again at your first post-op appointment.  Weight bearing is as tolerated.  First week stay inside on even terrain. May go up and down stairs one stair at a time utilizing the hand rail.  Once you feel confident, you may venture outside.    Exercises:  Perform quad sets  as instructed by the therapist at least 3 times a day  Perform leg hangs with you heel placed on a chair or footrest and allow you knee to stretch towards a more straightened position several times a day  Work on knee flexion exercises at least three times daily.  Avoid placing a pillow under your knee as this will cause your knee to become more stiff throughout the recovery process.    III. Precautions:  Everyone that comes near you should wash their hands  No elective dental, genital-urinary, or colon procedures or surgical procedures for 12 weeks after surgery unless absolutely necessary.   If dental work or surgical procedure is deemed absolutely necessary within 12 weeks of surgery, you will need to contact Dr. Bruce's office as you will need to take antibiotics 1 hour prior to any dental work (including teeth cleanings).  Dr. Bruce will prescribe prophylactic antibiotics for all dental procedures for one year  as a precautionary measure to minimize risk of infection.  If you are a diabetic or take immunosuppressive medication, you may have to take prophylactic antibiotics the remainder of your life before dental work.    Avoid sick people. If you must be around someone who is ill, they should wear a mask.  Avoid visits to the Emergency Room or Urgent Care unless you are having a life threatening event.   If you have leg swelling you may wear leg compression stocking.   Stockings are to be placed on in the morning and removed at night. Monitor the stockings to ensure that any swelling is not causing the stockings to become too tight. In this case, remove stockings immediately.    IV. INCISION CARE:  Dr. Bruce takes great care in closing your incision to give you the best opportunity for a healthy incision with minimal scarring. He places sutures below the skin surface that will eventually dissolve.  The incision is then covered with a skin glue which makes the incision water tight, and minimizes bleeding  onto the dressing.  No staples are used.  Occasionally one of the buried stitches may come to the skin surface and may need to be removed.  Please resist the temptation of removing the stitch by yourself.   will be happy to remove it for you.  Bruising around the incision and thigh is normal and to be expected.  Please keep dressing in place at least until post-op day 7. You may remove and replace dressing before day 7 if the dressing begins to fall off or becomes saturated. Wash your hands and under your finger nails prior to dressing changes.  After day 7 as long as incision is dry and intact, you may leave the dressing off and open to air.    If dressing must be changed, utilize dry gauze and paper tape. Avoid touching the side of the gauze that goes against the incision with your hands.  No creams or ointments to the incision until permission given by Dr. Bruce.  Do not touch or pick at the incision, or try to remove any sutures or skin glue.  Check dressing every day and notify surgeon immediately if any of the following signs or symptoms are noted:  Increase in redness  Increase in swelling of the entire extremity that does not go away with elevation.  Notify office that you may have a blood clot.    Drainage oozing from the incision  Pulling apart of the edges of the incision  Increase in overall body temperature (greater than 100.5 degrees)    V. Medications:   1. Anticoagulants: You will be discharged on an anticoagulant. This is a prophylactic medication that helps prevent blood clots during your post-operative period. The type and length of dosage varies based on your individual needs, procedure performed, and Dr. Bruce's preference.  While taking the anticoagulant, you should avoid taking any additional aspirin than what is prescribed.   Notify surgeon immediately if any inna bleeding is noted in the urine, stool, emesis, or from the nose or the incision. Blood in the stool will often appear  as black rather than red. Blood in urine may appear as pink. Blood in emesis may appear as brown/black like coffee grounds.  You will need to apply pressure for longer periods of time to any cuts or abrasions to stop bleeding  Avoid alcohol while taking anticoagulants.    2. Stool Softeners: You will be at greater risk of constipation after surgery due to being less mobile and the pain medications.   Take stool softeners as instructed by your surgeon while on pain medications. Over the counter Colace 100 mg 1-2 capsules twice daily.   If stools become too loose or too frequent, please decreases the dosage or stop the stool softener.  If constipation occurs despite use of stool softeners, you are to continue the stool softeners and add a laxative (Milk of Magnesia 1 ounce daily as needed).  If no bowel movement occurs past 3 days, then purchase Magnesium citrate and drink 1/2 bottle every 8 hours (on ice tastes better) until success. If no bowel movement by post-operative day 5 please call Dr. Bruce office for further instructions.   You may need to decrease or stop your pain medications if bowel movements to not occur.     Drink plenty of fluids, and eat fruits and vegetables during your recovery time.    3. Pain Medications utilized after surgery are narcotics and the law requires that the following information be given to all patients that are prescribed narcotics:  CLASSIFICATION: Pain medications are called Opioids and are narcotics  LEGALITIES: It is illegal to share narcotics with others and to drive within 24 hours of taking narcotics  POTENTIAL SIDE EFFECTS: Potential side effects of opioids include: nausea, vomiting, itching, dizziness, drowsiness, dry mouth, constipation, and difficulty urinating.  POTENTIAL ADVERSE EFFECTS:   Opioid tolerance can develop with use of pain medications and this simply means that it requires more and more of the medication to control pain; however, this is seen more in  "patients that use opioids for longer periods of time.  Opioid dependence can develop with use of Opioids and this simply means that to stop the medication can cause withdrawal symptoms; however, this is seen with patients that use Opioids for longer periods of time.  Opioid addiction can develop with use of Opioids and the incidence of this is very unlikely in patients who take the medications as ordered and stop the medications as instructed.  Opioid overdose can be dangerous, but is unlikely when the medication is taken as ordered and stopped when ordered. It is important not to mix opioids with alcohol or with and type of sedative such as Benadryl as this can lead to over sedation and respiratory difficulty.  DOSAGE:   Pain medications will need to be taken consistently for the first few days to decrease pain and promote adequate pain relief and participation in physical therapy.  After the initial surgical pain begins to resolve, you may begin to decrease the pain medication. By the end of 6 weeks, you should be off of pain medications except for before physical therapy or to help with pain when attempting to fall asleep.  Pain medications will be tapered to lesser dosages as you are further from your surgical day.  No pain medications will be provided after 3 months from surgery.     Refills will not be given by the office during evening hours, or weekends.  To seek refills on pain medications during the post-operative period, you must call the office 48 hours in advance to request the refill. The office will then notify you when to  the prescription. DO NOT wait until you are out of the medication to request a refill.  They can not be \"called in\" to the pharmacy.      How to Wean Off Pain Medication:   As you begin to feel better, gradually wean off the narcotic pain medication and begin to use it only for breakthrough pain.  Gradually reduce the total number of pills you take each day.  This can be " done by taking fewer pills at a time or by increasing the amount of time between each pill.    For example, if you were taking 2 pills every 6 hours you would be taking a total of 8 pills per day.  Reduce this to 6 pills per day, then 4-5 and so on.  This can be done by taking 1 pill at a time instead of 2, or by taking the pills every 8 hours instead of every 6.    As you begin to wean from the narcotic pain medication, begin substituting with over the counter tylenol when you are not taking the narcotic.  Limit total tylenol dosage to less than 4 grams per day.    V. FOLLOW-UP VISITS:  You will need to follow up in the office with Dr. Bruce at 3 weeks.  Please call [phone] if you need to confirm or reschedule your appointment time.   If you have any concerns or suspected complications prior to your follow up visit, please call your surgeons office. Do not wait until your appointment time if you suspect complications. These will need to be addressed in the office promptly.

## 2025-02-01 LAB
BACTERIA SPEC AEROBE CULT: NORMAL
BACTERIA SPEC AEROBE CULT: NORMAL
GRAM STN SPEC: NORMAL

## 2025-02-03 LAB
BACTERIA SPEC ANAEROBE CULT: NORMAL
BACTERIA SPEC ANAEROBE CULT: NORMAL

## 2025-02-05 ENCOUNTER — TELEPHONE (OUTPATIENT)
Dept: ORTHOPEDIC SURGERY | Facility: HOSPITAL | Age: 85
End: 2025-02-05
Payer: MEDICARE

## 2025-02-05 NOTE — TELEPHONE ENCOUNTER
Called and spoke with Mr. Marley to see how he is doing as he is 1 week SP LTK Revision. He said things are going really well and he is pleased with his progress. He is starting with PT and they said he's right on track. He is off the pain medication. Dressing to be changed today. BM's are much better now that he is off the pain medication. Things are going well and Mr. Marley doesn't have any questions for me at this time. He was given my contact information should he need anything.

## 2025-02-24 ENCOUNTER — APPOINTMENT (OUTPATIENT)
Dept: GENERAL RADIOLOGY | Facility: HOSPITAL | Age: 85
End: 2025-02-24
Payer: MEDICARE

## 2025-02-24 ENCOUNTER — ANESTHESIA EVENT (OUTPATIENT)
Dept: PERIOP | Facility: HOSPITAL | Age: 85
End: 2025-02-24
Payer: MEDICARE

## 2025-02-24 ENCOUNTER — HOSPITAL ENCOUNTER (INPATIENT)
Facility: HOSPITAL | Age: 85
LOS: 9 days | Discharge: NURSING, REHAB OR LONG-TERM CARE | End: 2025-03-05
Attending: INTERNAL MEDICINE | Admitting: INTERNAL MEDICINE
Payer: MEDICARE

## 2025-02-24 ENCOUNTER — APPOINTMENT (OUTPATIENT)
Dept: ULTRASOUND IMAGING | Facility: HOSPITAL | Age: 85
End: 2025-02-24
Payer: MEDICARE

## 2025-02-24 ENCOUNTER — APPOINTMENT (OUTPATIENT)
Dept: CARDIOLOGY | Facility: HOSPITAL | Age: 85
End: 2025-02-24
Payer: MEDICARE

## 2025-02-24 DIAGNOSIS — T84.54XA INFECTION OF TOTAL LEFT KNEE REPLACEMENT: ICD-10-CM

## 2025-02-24 DIAGNOSIS — M00.9 PYOGENIC ARTHRITIS OF LEFT KNEE JOINT, DUE TO UNSPECIFIED ORGANISM: ICD-10-CM

## 2025-02-24 DIAGNOSIS — Z96.652 S/P REVISION OF TOTAL KNEE, LEFT: ICD-10-CM

## 2025-02-24 DIAGNOSIS — M25.562 ACUTE PAIN OF LEFT KNEE: Primary | ICD-10-CM

## 2025-02-24 DIAGNOSIS — N17.9 ACUTE KIDNEY INJURY: ICD-10-CM

## 2025-02-24 PROBLEM — G25.0 ESSENTIAL TREMOR: Status: ACTIVE | Noted: 2025-02-24

## 2025-02-24 PROBLEM — M25.569 KNEE PAIN: Status: ACTIVE | Noted: 2025-02-24

## 2025-02-24 PROBLEM — E86.0 DEHYDRATION: Status: ACTIVE | Noted: 2025-02-24

## 2025-02-24 PROBLEM — I10 ESSENTIAL HYPERTENSION: Status: ACTIVE | Noted: 2025-02-24

## 2025-02-24 PROBLEM — R50.9 FEVER: Status: ACTIVE | Noted: 2025-02-24

## 2025-02-24 PROBLEM — R53.1 GENERALIZED WEAKNESS: Status: ACTIVE | Noted: 2025-02-24

## 2025-02-24 LAB
ALBUMIN SERPL-MCNC: 3.7 G/DL (ref 3.5–5.2)
ALBUMIN/GLOB SERPL: 1.4 G/DL
ALP SERPL-CCNC: 69 U/L (ref 39–117)
ALT SERPL W P-5'-P-CCNC: 131 U/L (ref 1–41)
ANION GAP SERPL CALCULATED.3IONS-SCNC: 12.3 MMOL/L (ref 5–15)
ANION GAP SERPL CALCULATED.3IONS-SCNC: 16 MMOL/L (ref 10–20)
APPEARANCE FLD: ABNORMAL
APTT PPP: 31.2 SECONDS (ref 22.7–35.4)
AST SERPL-CCNC: 138 U/L (ref 1–40)
BACTERIA UR QL AUTO: ABNORMAL /HPF
BASOPHILS # BLD AUTO: 0.02 10*3/MM3 (ref 0–0.2)
BASOPHILS NFR BLD AUTO: 0.2 % (ref 0–1.5)
BH CV LOWER VASCULAR LEFT COMMON FEMORAL AUGMENT: NORMAL
BH CV LOWER VASCULAR LEFT COMMON FEMORAL COMPETENT: NORMAL
BH CV LOWER VASCULAR LEFT COMMON FEMORAL COMPRESS: NORMAL
BH CV LOWER VASCULAR LEFT COMMON FEMORAL PHASIC: NORMAL
BH CV LOWER VASCULAR LEFT COMMON FEMORAL SPONT: NORMAL
BH CV LOWER VASCULAR LEFT DISTAL FEMORAL COMPRESS: NORMAL
BH CV LOWER VASCULAR LEFT GASTRONEMIUS COMPRESS: NORMAL
BH CV LOWER VASCULAR LEFT GREATER SAPH AK COMPRESS: NORMAL
BH CV LOWER VASCULAR LEFT GREATER SAPH BK COMPRESS: NORMAL
BH CV LOWER VASCULAR LEFT LESSER SAPH COMPRESS: NORMAL
BH CV LOWER VASCULAR LEFT MID FEMORAL AUGMENT: NORMAL
BH CV LOWER VASCULAR LEFT MID FEMORAL COMPETENT: NORMAL
BH CV LOWER VASCULAR LEFT MID FEMORAL COMPRESS: NORMAL
BH CV LOWER VASCULAR LEFT MID FEMORAL PHASIC: NORMAL
BH CV LOWER VASCULAR LEFT MID FEMORAL SPONT: NORMAL
BH CV LOWER VASCULAR LEFT PERONEAL COMPRESS: NORMAL
BH CV LOWER VASCULAR LEFT POPLITEAL AUGMENT: NORMAL
BH CV LOWER VASCULAR LEFT POPLITEAL COMPETENT: NORMAL
BH CV LOWER VASCULAR LEFT POPLITEAL COMPRESS: NORMAL
BH CV LOWER VASCULAR LEFT POPLITEAL PHASIC: NORMAL
BH CV LOWER VASCULAR LEFT POPLITEAL SPONT: NORMAL
BH CV LOWER VASCULAR LEFT POSTERIOR TIBIAL COMPRESS: NORMAL
BH CV LOWER VASCULAR LEFT PROXIMAL FEMORAL COMPRESS: NORMAL
BH CV LOWER VASCULAR LEFT SAPHENOFEMORAL JUNCTION COMPRESS: NORMAL
BH CV LOWER VASCULAR RIGHT COMMON FEMORAL AUGMENT: NORMAL
BH CV LOWER VASCULAR RIGHT COMMON FEMORAL COMPETENT: NORMAL
BH CV LOWER VASCULAR RIGHT COMMON FEMORAL COMPRESS: NORMAL
BH CV LOWER VASCULAR RIGHT COMMON FEMORAL PHASIC: NORMAL
BH CV LOWER VASCULAR RIGHT COMMON FEMORAL SPONT: NORMAL
BH CV VAS PRELIMINARY FINDINGS SCRIPTING: 1
BILIRUB SERPL-MCNC: 1.2 MG/DL (ref 0–1.2)
BILIRUB UR QL STRIP: ABNORMAL
BUN BLDA-MCNC: 37 MG/DL (ref 8–26)
BUN SERPL-MCNC: 37 MG/DL (ref 8–23)
BUN/CREAT SERPL: 18.9 (ref 7–25)
CA-I BLDA-SCNC: 1.12 MMOL/L (ref 1.12–1.32)
CALCIUM SPEC-SCNC: 9 MG/DL (ref 8.6–10.5)
CHLORIDE BLDA-SCNC: 99 MMOL/L (ref 98–109)
CHLORIDE SERPL-SCNC: 100 MMOL/L (ref 98–107)
CLARITY UR: ABNORMAL
CO2 BLDA-SCNC: 24 MMOL/L (ref 24–29)
CO2 SERPL-SCNC: 24.7 MMOL/L (ref 22–29)
COLOR FLD: ABNORMAL
COLOR UR: ABNORMAL
CREAT BLDA-MCNC: 2.1 MG/DL (ref 0.6–1.3)
CREAT SERPL-MCNC: 1.96 MG/DL (ref 0.76–1.27)
CRP SERPL-MCNC: 22.5 MG/DL (ref 0–0.5)
CRYSTALS FLD MICRO: NORMAL
D-LACTATE SERPL-SCNC: 0.9 MMOL/L (ref 0.3–2)
DEPRECATED RDW RBC AUTO: 41.7 FL (ref 37–54)
EGFRCR SERPLBLD CKD-EPI 2021: 30.8 ML/MIN/1.73
EGFRCR SERPLBLD CKD-EPI 2021: 33.5 ML/MIN/1.73
EOSINOPHIL # BLD AUTO: 0 10*3/MM3 (ref 0–0.4)
EOSINOPHIL NFR BLD AUTO: 0 % (ref 0.3–6.2)
ERYTHROCYTE [DISTWIDTH] IN BLOOD BY AUTOMATED COUNT: 12.5 % (ref 12.3–15.4)
ERYTHROCYTE [SEDIMENTATION RATE] IN BLOOD: 20 MM/HR (ref 0–20)
FLUAV SUBTYP SPEC NAA+PROBE: NOT DETECTED
FLUBV RNA ISLT QL NAA+PROBE: NOT DETECTED
GLOBULIN UR ELPH-MCNC: 2.6 GM/DL
GLUCOSE BLDC GLUCOMTR-MCNC: 135 MG/DL (ref 70–105)
GLUCOSE SERPL-MCNC: 136 MG/DL (ref 65–99)
GLUCOSE UR STRIP-MCNC: NEGATIVE MG/DL
GRAN CASTS URNS QL MICRO: ABNORMAL /LPF
HCT VFR BLD AUTO: 41.9 % (ref 37.5–51)
HCT VFR BLDA CALC: 42 % (ref 38–51)
HGB BLD-MCNC: 14.1 G/DL (ref 13–17.7)
HGB BLDA-MCNC: 14.3 G/DL (ref 12–17)
HGB UR QL STRIP.AUTO: NEGATIVE
HYALINE CASTS UR QL AUTO: ABNORMAL /LPF
IMM GRANULOCYTES # BLD AUTO: 0.04 10*3/MM3 (ref 0–0.05)
IMM GRANULOCYTES NFR BLD AUTO: 0.5 % (ref 0–0.5)
INR PPP: 1.16 (ref 0.9–1.1)
KETONES UR QL STRIP: ABNORMAL
LEUKOCYTE ESTERASE UR QL STRIP.AUTO: ABNORMAL
LYMPHOCYTES # BLD AUTO: 0.51 10*3/MM3 (ref 0.7–3.1)
LYMPHOCYTES NFR BLD AUTO: 6 % (ref 19.6–45.3)
LYMPHOCYTES NFR FLD MANUAL: 2 %
MCH RBC QN AUTO: 30.7 PG (ref 26.6–33)
MCHC RBC AUTO-ENTMCNC: 33.7 G/DL (ref 31.5–35.7)
MCV RBC AUTO: 91.1 FL (ref 79–97)
METHOD: ABNORMAL
MONOCYTES # BLD AUTO: 0.95 10*3/MM3 (ref 0.1–0.9)
MONOCYTES NFR BLD AUTO: 11.2 % (ref 5–12)
MONOCYTES NFR FLD: 3 %
MRSA DNA SPEC QL NAA+PROBE: NORMAL
NEUTROPHILS NFR BLD AUTO: 6.99 10*3/MM3 (ref 1.7–7)
NEUTROPHILS NFR BLD AUTO: 82.1 % (ref 42.7–76)
NEUTROPHILS NFR FLD MANUAL: 95 %
NITRITE UR QL STRIP: POSITIVE
NRBC BLD AUTO-RTO: 0 /100 WBC (ref 0–0.2)
NUC CELL # FLD: ABNORMAL /MM3
PH UR STRIP.AUTO: <=5 [PH] (ref 5–8)
PLATELET # BLD AUTO: 184 10*3/MM3 (ref 140–450)
PMV BLD AUTO: 11.3 FL (ref 6–12)
POTASSIUM BLDA-SCNC: 3.3 MMOL/L (ref 3.5–4.9)
POTASSIUM SERPL-SCNC: 3.5 MMOL/L (ref 3.5–5.2)
PROT SERPL-MCNC: 6.3 G/DL (ref 6–8.5)
PROT UR QL STRIP: ABNORMAL
PROTHROMBIN TIME: 14.8 SECONDS (ref 11.7–14.2)
RBC # BLD AUTO: 4.6 10*6/MM3 (ref 4.14–5.8)
RBC # UR STRIP: ABNORMAL /HPF
REF LAB TEST METHOD: ABNORMAL
SARS-COV-2 RNA RESP QL NAA+PROBE: NOT DETECTED
SODIUM BLD-SCNC: 136 MMOL/L (ref 138–146)
SODIUM SERPL-SCNC: 137 MMOL/L (ref 136–145)
SP GR UR STRIP: 1.03 (ref 1–1.03)
SQUAMOUS #/AREA URNS HPF: ABNORMAL /HPF
UROBILINOGEN UR QL STRIP: ABNORMAL
WBC # UR STRIP: ABNORMAL /HPF
WBC NRBC COR # BLD AUTO: 8.51 10*3/MM3 (ref 3.4–10.8)

## 2025-02-24 PROCEDURE — 85025 COMPLETE CBC W/AUTO DIFF WBC: CPT | Performed by: NURSE PRACTITIONER

## 2025-02-24 PROCEDURE — 85652 RBC SED RATE AUTOMATED: CPT | Performed by: NURSE PRACTITIONER

## 2025-02-24 PROCEDURE — 87641 MR-STAPH DNA AMP PROBE: CPT | Performed by: NURSE PRACTITIONER

## 2025-02-24 PROCEDURE — 87070 CULTURE OTHR SPECIMN AEROBIC: CPT | Performed by: NURSE PRACTITIONER

## 2025-02-24 PROCEDURE — 85014 HEMATOCRIT: CPT

## 2025-02-24 PROCEDURE — 93971 EXTREMITY STUDY: CPT

## 2025-02-24 PROCEDURE — 82945 GLUCOSE OTHER FLUID: CPT | Performed by: NURSE PRACTITIONER

## 2025-02-24 PROCEDURE — 80047 BASIC METABLC PNL IONIZED CA: CPT

## 2025-02-24 PROCEDURE — 25810000003 SODIUM CHLORIDE 0.9 % SOLUTION: Performed by: INTERNAL MEDICINE

## 2025-02-24 PROCEDURE — 85610 PROTHROMBIN TIME: CPT | Performed by: NURSE PRACTITIONER

## 2025-02-24 PROCEDURE — 25010000002 CEFTRIAXONE PER 250 MG: Performed by: NURSE PRACTITIONER

## 2025-02-24 PROCEDURE — 83605 ASSAY OF LACTIC ACID: CPT | Performed by: NURSE PRACTITIONER

## 2025-02-24 PROCEDURE — 86140 C-REACTIVE PROTEIN: CPT | Performed by: NURSE PRACTITIONER

## 2025-02-24 PROCEDURE — 73564 X-RAY EXAM KNEE 4 OR MORE: CPT

## 2025-02-24 PROCEDURE — 84157 ASSAY OF PROTEIN OTHER: CPT | Performed by: NURSE PRACTITIONER

## 2025-02-24 PROCEDURE — 25010000002 ONDANSETRON PER 1 MG: Performed by: NURSE PRACTITIONER

## 2025-02-24 PROCEDURE — 87636 SARSCOV2 & INF A&B AMP PRB: CPT | Performed by: NURSE PRACTITIONER

## 2025-02-24 PROCEDURE — 89051 BODY FLUID CELL COUNT: CPT | Performed by: NURSE PRACTITIONER

## 2025-02-24 PROCEDURE — 85730 THROMBOPLASTIN TIME PARTIAL: CPT | Performed by: NURSE PRACTITIONER

## 2025-02-24 PROCEDURE — 81001 URINALYSIS AUTO W/SCOPE: CPT | Performed by: INTERNAL MEDICINE

## 2025-02-24 PROCEDURE — 99285 EMERGENCY DEPT VISIT HI MDM: CPT

## 2025-02-24 PROCEDURE — 93971 EXTREMITY STUDY: CPT | Performed by: SURGERY

## 2025-02-24 PROCEDURE — 87205 SMEAR GRAM STAIN: CPT | Performed by: NURSE PRACTITIONER

## 2025-02-24 PROCEDURE — 36415 COLL VENOUS BLD VENIPUNCTURE: CPT

## 2025-02-24 PROCEDURE — 25010000002 HYDROMORPHONE 1 MG/ML SOLUTION: Performed by: NURSE PRACTITIONER

## 2025-02-24 PROCEDURE — 80053 COMPREHEN METABOLIC PANEL: CPT | Performed by: NURSE PRACTITIONER

## 2025-02-24 PROCEDURE — 76775 US EXAM ABDO BACK WALL LIM: CPT

## 2025-02-24 PROCEDURE — 87040 BLOOD CULTURE FOR BACTERIA: CPT | Performed by: NURSE PRACTITIONER

## 2025-02-24 PROCEDURE — 25810000003 SODIUM CHLORIDE 0.9 % SOLUTION: Performed by: NURSE PRACTITIONER

## 2025-02-24 PROCEDURE — 89060 EXAM SYNOVIAL FLUID CRYSTALS: CPT | Performed by: NURSE PRACTITIONER

## 2025-02-24 PROCEDURE — 87147 CULTURE TYPE IMMUNOLOGIC: CPT | Performed by: NURSE PRACTITIONER

## 2025-02-24 PROCEDURE — 25010000002 VANCOMYCIN 1.75-0.9 GM/500ML-% SOLUTION: Performed by: NURSE PRACTITIONER

## 2025-02-24 RX ORDER — DICYCLOMINE HYDROCHLORIDE 10 MG/1
10 CAPSULE ORAL 3 TIMES DAILY
Status: DISCONTINUED | OUTPATIENT
Start: 2025-02-24 | End: 2025-03-05 | Stop reason: HOSPADM

## 2025-02-24 RX ORDER — BISACODYL 10 MG
10 SUPPOSITORY, RECTAL RECTAL DAILY PRN
Status: DISCONTINUED | OUTPATIENT
Start: 2025-02-24 | End: 2025-03-05 | Stop reason: HOSPADM

## 2025-02-24 RX ORDER — SODIUM CHLORIDE 0.9 % (FLUSH) 0.9 %
10 SYRINGE (ML) INJECTION EVERY 12 HOURS SCHEDULED
Status: DISCONTINUED | OUTPATIENT
Start: 2025-02-24 | End: 2025-03-05 | Stop reason: HOSPADM

## 2025-02-24 RX ORDER — HYDRALAZINE HYDROCHLORIDE 20 MG/ML
10 INJECTION INTRAMUSCULAR; INTRAVENOUS EVERY 6 HOURS PRN
Status: DISCONTINUED | OUTPATIENT
Start: 2025-02-24 | End: 2025-03-05 | Stop reason: HOSPADM

## 2025-02-24 RX ORDER — ACETAMINOPHEN 650 MG/1
650 SUPPOSITORY RECTAL EVERY 4 HOURS PRN
Status: DISCONTINUED | OUTPATIENT
Start: 2025-02-24 | End: 2025-03-05 | Stop reason: HOSPADM

## 2025-02-24 RX ORDER — SODIUM CHLORIDE 0.9 % (FLUSH) 0.9 %
10 SYRINGE (ML) INJECTION AS NEEDED
Status: DISCONTINUED | OUTPATIENT
Start: 2025-02-24 | End: 2025-03-05 | Stop reason: HOSPADM

## 2025-02-24 RX ORDER — VANCOMYCIN 1.75 GRAM/500 ML IN 0.9 % SODIUM CHLORIDE INTRAVENOUS
20 ONCE
Status: COMPLETED | OUTPATIENT
Start: 2025-02-24 | End: 2025-02-24

## 2025-02-24 RX ORDER — GABAPENTIN 600 MG/1
1200 TABLET ORAL 3 TIMES DAILY
COMMUNITY
End: 2025-03-05 | Stop reason: HOSPADM

## 2025-02-24 RX ORDER — HYDROCODONE BITARTRATE AND ACETAMINOPHEN 7.5; 325 MG/1; MG/1
1 TABLET ORAL EVERY 6 HOURS PRN
Status: DISCONTINUED | OUTPATIENT
Start: 2025-02-24 | End: 2025-03-05 | Stop reason: HOSPADM

## 2025-02-24 RX ORDER — POLYETHYLENE GLYCOL 3350 17 G/17G
17 POWDER, FOR SOLUTION ORAL DAILY PRN
Status: DISCONTINUED | OUTPATIENT
Start: 2025-02-24 | End: 2025-03-05 | Stop reason: HOSPADM

## 2025-02-24 RX ORDER — ACETAMINOPHEN 500 MG
1000 TABLET ORAL ONCE
Status: COMPLETED | OUTPATIENT
Start: 2025-02-24 | End: 2025-02-24

## 2025-02-24 RX ORDER — ACETAMINOPHEN 325 MG/1
650 TABLET ORAL EVERY 4 HOURS PRN
Status: DISCONTINUED | OUTPATIENT
Start: 2025-02-24 | End: 2025-03-05 | Stop reason: HOSPADM

## 2025-02-24 RX ORDER — BISACODYL 5 MG/1
5 TABLET, DELAYED RELEASE ORAL DAILY PRN
Status: DISCONTINUED | OUTPATIENT
Start: 2025-02-24 | End: 2025-03-05 | Stop reason: HOSPADM

## 2025-02-24 RX ORDER — ONDANSETRON 4 MG/1
4 TABLET, ORALLY DISINTEGRATING ORAL EVERY 6 HOURS PRN
Status: DISCONTINUED | OUTPATIENT
Start: 2025-02-24 | End: 2025-03-05 | Stop reason: HOSPADM

## 2025-02-24 RX ORDER — ACETAMINOPHEN 160 MG/5ML
650 SOLUTION ORAL EVERY 4 HOURS PRN
Status: DISCONTINUED | OUTPATIENT
Start: 2025-02-24 | End: 2025-03-05 | Stop reason: HOSPADM

## 2025-02-24 RX ORDER — ONDANSETRON 2 MG/ML
4 INJECTION INTRAMUSCULAR; INTRAVENOUS EVERY 6 HOURS PRN
Status: DISCONTINUED | OUTPATIENT
Start: 2025-02-24 | End: 2025-03-05 | Stop reason: HOSPADM

## 2025-02-24 RX ORDER — SODIUM CHLORIDE 9 MG/ML
100 INJECTION, SOLUTION INTRAVENOUS CONTINUOUS
Status: DISPENSED | OUTPATIENT
Start: 2025-02-24 | End: 2025-02-26

## 2025-02-24 RX ORDER — NITROGLYCERIN 0.4 MG/1
0.4 TABLET SUBLINGUAL
Status: DISCONTINUED | OUTPATIENT
Start: 2025-02-24 | End: 2025-03-05 | Stop reason: HOSPADM

## 2025-02-24 RX ORDER — ENOXAPARIN SODIUM 100 MG/ML
40 INJECTION SUBCUTANEOUS DAILY
Status: DISCONTINUED | OUTPATIENT
Start: 2025-02-24 | End: 2025-02-24

## 2025-02-24 RX ORDER — ASPIRIN 81 MG/1
81 TABLET ORAL 2 TIMES DAILY
Status: DISCONTINUED | OUTPATIENT
Start: 2025-02-24 | End: 2025-02-25

## 2025-02-24 RX ORDER — POTASSIUM CHLORIDE 1500 MG/1
40 TABLET, EXTENDED RELEASE ORAL EVERY 4 HOURS
Status: COMPLETED | OUTPATIENT
Start: 2025-02-24 | End: 2025-02-24

## 2025-02-24 RX ORDER — GABAPENTIN 300 MG/1
300 CAPSULE ORAL 3 TIMES DAILY
Status: DISCONTINUED | OUTPATIENT
Start: 2025-02-24 | End: 2025-03-02

## 2025-02-24 RX ORDER — AMOXICILLIN 250 MG
2 CAPSULE ORAL 2 TIMES DAILY PRN
Status: DISCONTINUED | OUTPATIENT
Start: 2025-02-24 | End: 2025-03-05 | Stop reason: HOSPADM

## 2025-02-24 RX ORDER — TAMSULOSIN HYDROCHLORIDE 0.4 MG/1
0.4 CAPSULE ORAL EVERY EVENING
Status: DISCONTINUED | OUTPATIENT
Start: 2025-02-24 | End: 2025-03-05 | Stop reason: HOSPADM

## 2025-02-24 RX ORDER — ONDANSETRON 2 MG/ML
4 INJECTION INTRAMUSCULAR; INTRAVENOUS ONCE
Status: COMPLETED | OUTPATIENT
Start: 2025-02-24 | End: 2025-02-24

## 2025-02-24 RX ORDER — SODIUM CHLORIDE 9 MG/ML
40 INJECTION, SOLUTION INTRAVENOUS AS NEEDED
Status: DISCONTINUED | OUTPATIENT
Start: 2025-02-24 | End: 2025-03-05 | Stop reason: HOSPADM

## 2025-02-24 RX ADMIN — POTASSIUM CHLORIDE 40 MEQ: 20 TABLET, EXTENDED RELEASE ORAL at 17:39

## 2025-02-24 RX ADMIN — SODIUM CHLORIDE 100 ML/HR: 9 INJECTION, SOLUTION INTRAVENOUS at 16:04

## 2025-02-24 RX ADMIN — CEFTRIAXONE 2000 MG: 2 INJECTION, POWDER, FOR SOLUTION INTRAMUSCULAR; INTRAVENOUS at 09:06

## 2025-02-24 RX ADMIN — DICYCLOMINE HYDROCHLORIDE 10 MG: 10 CAPSULE ORAL at 16:04

## 2025-02-24 RX ADMIN — SODIUM CHLORIDE 1000 ML: 9 INJECTION, SOLUTION INTRAVENOUS at 12:26

## 2025-02-24 RX ADMIN — Medication 1750 MG: at 09:49

## 2025-02-24 RX ADMIN — POTASSIUM CHLORIDE 40 MEQ: 20 TABLET, EXTENDED RELEASE ORAL at 13:39

## 2025-02-24 RX ADMIN — Medication 10 ML: at 11:29

## 2025-02-24 RX ADMIN — HYDROCODONE BITARTRATE AND ACETAMINOPHEN 1 TABLET: 7.5; 325 TABLET ORAL at 16:04

## 2025-02-24 RX ADMIN — TAMSULOSIN HYDROCHLORIDE 0.4 MG: 0.4 CAPSULE ORAL at 17:39

## 2025-02-24 RX ADMIN — HYDROMORPHONE HYDROCHLORIDE 0.5 MG: 1 INJECTION, SOLUTION INTRAMUSCULAR; INTRAVENOUS; SUBCUTANEOUS at 07:38

## 2025-02-24 RX ADMIN — SODIUM CHLORIDE 500 ML: 9 INJECTION, SOLUTION INTRAVENOUS at 11:29

## 2025-02-24 RX ADMIN — ONDANSETRON 4 MG: 2 INJECTION, SOLUTION INTRAMUSCULAR; INTRAVENOUS at 07:38

## 2025-02-24 RX ADMIN — SODIUM CHLORIDE 100 ML/HR: 9 INJECTION, SOLUTION INTRAVENOUS at 17:14

## 2025-02-24 RX ADMIN — ACETAMINOPHEN 1000 MG: 500 TABLET, FILM COATED ORAL at 10:09

## 2025-02-24 NOTE — PROGRESS NOTES
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  Unfortunately he will need surgery for this.    Will plan on OR tomorrow am for I&D of the left knee with poly exchange    NPO after midnight  Hold anticoagulation    Wilmer Bruce MD  Orthopaedic Surgeon    [facility]  [phone]

## 2025-02-24 NOTE — CONSULTS
NEPHROLOGY CONSULTATION-----[facility]    [facility]  [phone]  Cedric Jacobs MD    Patient Care Team:  Rolf Harvey MD as PCP - General (Family Medicine)  Cedric Jacobs MD as Consulting Physician (Nephrology)    CC/REASON FOR CONSULTATION: Elevated creatinine    PHYSICIAN REQUESTING CONSULTATION: Vanessa King MD     History of Present Illness    84-year-old male with past medical history of hypertension, recent left knee replacement to the hospital on 2025 left knee pain, fever and fall.  His creatinine is 1.9.  Previously from couple years ago it was 1.23.  Blood pressure soft in the low 100s.  Denies any dysuria or gross hematuria.  No vomiting or diarrhea.  He has decreased p.o. intake.  He was on meloxicam/hydrochlorothiazide/lisinopril prior to admission.    Review of Systems   As noted above otherwise 10 systems were reviewed and were negative.    Past Medical History:   Diagnosis Date    Enlarged prostate     Essential tremor     BOTH HANDS    Hypertension     Knee pain, left     Rotator cuff tear, left     Wears dentures        Past Surgical History:   Procedure Laterality Date    CATARACT EXTRACTION, BILATERAL      COLONOSCOPY      ELBOW OLECRANNON BURSA EXCISION Right     X2    ENDOSCOPY      KNEE MINI REVISION Left 2025    Procedure: LEFT  TOTAL KNEE ARTHROPLASTY REVISION POLYETHYLENE EXCHANGE;  Surgeon: Wilmer Bruce MD;  Location: [facility] OR [facility];  Service: Orthopedics;  Laterality: Left;    SPINE SURGERY      SPINAL STENOSIS    SPINE SURGERY  2016    RUPTURED DISC    TONSILLECTOMY AND ADENOIDECTOMY      AGES 20S    TOTAL KNEE ARTHROPLASTY Left     VASECTOMY         Family History   Problem Relation Age of Onset    Malig Hyperthermia Neg Hx        Social History     Tobacco Use    Smoking status: Former     Current packs/day: 0.00     Types: Cigarettes     Quit date:      Years since quittin.1     Smokeless tobacco: Never   Vaping Use    Vaping status: Never Used   Substance Use Topics    Alcohol use: Yes     Comment: OCCASIONAL    Drug use: Never       Home Meds: (Not in a hospital admission)    Prior to Admission medications    Medication Sig Start Date End Date Taking? Authorizing Provider   aspirin 81 MG EC tablet Take 1 tablet by mouth 2 (Two) Times a Day for 30 days. 1/30/25 3/1/25  Wilmer Bruce MD   B Complex Vitamins (Vitamin-B Complex) tablet Take 1 tablet by mouth Daily. HOLD FOR SURGERY 4/17/13   Edmund De Los Santos MD   Collagen-Vitamin C-Biotin (COLLAGEN PO) Take  by mouth. POWDER SUPPLEMENT DAILY-HOLD FOR SURGERY    Edmund De Los Santos MD   dicyclomine (BENTYL) 20 MG tablet Take 1 tablet by mouth 4 (Four) Times a Day. TAKES ONE IN THE MORNING, ONE IN THE EVENING, AND 2 AT BEDTIME    Edmund De Los Santos MD   gabapentin (NEURONTIN) 600 MG tablet Take 2 tablets by mouth 3 (Three) Times a Day.    Edmund De Los Santos MD   HYDROcodone-acetaminophen (Norco) 7.5-325 MG per tablet Take 1 tablet by mouth Every 6 (Six) Hours As Needed for Moderate Pain. 1/30/25   Wilmer Bruce MD   lisinopril-hydrochlorothiazide (PRINZIDE,ZESTORETIC) 10-12.5 MG per tablet Take 1 tablet by mouth Daily.    Edmund De Los Santos MD   meloxicam (MOBIC) 15 MG tablet Take 1 tablet by mouth Daily. 1/30/25   Wilmer Bruce MD   metoprolol succinate XL (TOPROL-XL) 25 MG 24 hr tablet Take 1 tablet by mouth Every Night. 4/17/13   Edmund De Los Santos MD   Omega-3 Fatty Acids (fish oil) 1000 MG capsule capsule Take 1 capsule by mouth Daily With Breakfast. START HOLDING FOR SURGERY    Edmund De Los Santos MD   primidone (MYSOLINE) 50 MG tablet Take 2 tablets by mouth Every Night. TAKES 2 NIGHTLY 7/21/15   Edmund De Los Santos MD   Specialty Vitamins Products (NERVIVE NERVE RELIEF PO) Take 1 tablet by mouth Daily. HOLD FOR SURGERY    Edmund De Los Santos MD   tadalafil (CIALIS) 20 MG tablet Take 1 tablet by mouth Every  Evening. HOLD FOR 48 HOURS PRIOR- TAKES FOR ED 4/14/23   Edmund De Los Santos MD   tamsulosin (FLOMAX) 0.4 MG capsule 24 hr capsule Take 1 capsule by mouth Every Evening.    Edmund De Los Santos MD   gabapentin (NEURONTIN) 300 MG capsule Take 1 capsule by mouth 3 (Three) Times a Day. 7/21/15 2/24/25  Edmund De Los Santos MD        Scheduled Meds:  [Held by provider] aspirin, 81 mg, Oral, BID  [START ON 2/25/2025] cefTRIAXone, 1,000 mg, Intravenous, Q24H  dicyclomine, 10 mg, Oral, TID  enoxaparin, 40 mg, Subcutaneous, Daily  [Held by provider] gabapentin, 300 mg, Oral, TID  potassium chloride ER, 40 mEq, Oral, Q4H  sodium chloride, 10 mL, Intravenous, Q12H  tamsulosin, 0.4 mg, Oral, Q PM        Continuous Infusions:       PRN Meds:    acetaminophen **OR** acetaminophen **OR** acetaminophen    senna-docusate sodium **AND** polyethylene glycol **AND** bisacodyl **AND** bisacodyl    Calcium Replacement - Follow Nurse / BPA Driven Protocol    hydrALAZINE    HYDROcodone-acetaminophen    Magnesium Standard Dose Replacement - Follow Nurse / BPA Driven Protocol    melatonin    nitroglycerin    ondansetron ODT **OR** ondansetron    Phosphorus Replacement - Follow Nurse / BPA Driven Protocol    Potassium Replacement - Follow Nurse / BPA Driven Protocol    sodium chloride    sodium chloride    Allergies:  Patient has no known allergies.    OBJECTIVE    Vital Signs  Temp:  [97.8 °F (36.6 °C)-100.9 °F (38.3 °C)] 97.8 °F (36.6 °C)  Heart Rate:  [52-74] 52  Resp:  [16] 16  BP: ()/(49-72) 96/56    I/O this shift:  In: 1100 [IV Piggyback:1100]  Out: -   No intake/output data recorded.    Physical Exam:  General Appearance: alert, appears stated age and cooperative  Head: normocephalic, without obvious abnormality and atraumatic  Eyes: conjunctivae and sclerae normal and no icterus  Neck: supple and no JVD  Lungs: clear to auscultation and respirations regular  Heart: regular rhythm & normal rate and normal S1, S2  Chest  "Wall: no abnormalities observed  Abdomen: normal bowel sounds and soft, nontender  Extremities: moves extremities well, no edema, no cyanosis  Skin: no bleeding, bruising or rash  Neurologic: Alert, and oriented. No focal deficits    Results Review:    I reviewed the patient's new clinical results.    WBC WBC   Date Value Ref Range Status   02/24/2025 8.51 3.40 - 10.80 10*3/mm3 Final      HGB Hemoglobin   Date Value Ref Range Status   02/24/2025 14.3 12.0 - 17.0 g/dL Final   02/24/2025 14.1 13.0 - 17.7 g/dL Final      HCT Hematocrit   Date Value Ref Range Status   02/24/2025 42 38 - 51 % Final   02/24/2025 41.9 37.5 - 51.0 % Final      Platelets No results found for: \"LABPLAT\"   MCV MCV   Date Value Ref Range Status   02/24/2025 91.1 79.0 - 97.0 fL Final          Sodium Sodium   Date Value Ref Range Status   02/24/2025 137 136 - 145 mmol/L Final      Potassium Potassium   Date Value Ref Range Status   02/24/2025 3.5 3.5 - 5.2 mmol/L Final      Chloride Chloride   Date Value Ref Range Status   02/24/2025 100 98 - 107 mmol/L Final      CO2 CO2   Date Value Ref Range Status   02/24/2025 24.7 22.0 - 29.0 mmol/L Final      BUN BUN   Date Value Ref Range Status   02/24/2025 37 (H) 8 - 23 mg/dL Final      Creatinine Creatinine   Date Value Ref Range Status   02/24/2025 2.10 (H) 0.60 - 1.30 mg/dL Final   02/24/2025 1.96 (H) 0.76 - 1.27 mg/dL Final      Calcium Calcium   Date Value Ref Range Status   02/24/2025 9.0 8.6 - 10.5 mg/dL Final      PO4 No results found for: \"CAPO4\"   Albumin Albumin   Date Value Ref Range Status   02/24/2025 3.7 3.5 - 5.2 g/dL Final      Magnesium No results found for: \"MG\"   Uric Acid No results found for: \"URICACID\"       Imaging Results (Last 72 Hours)       Procedure Component Value Units Date/Time    XR Knee 4+ View Left [[id number]] Collected: 02/24/25 0716     Updated: 02/24/25 0725    Narrative:      XR KNEE 4+ VW LEFT    Date of Exam: 2/24/2025 7:12 AM EST    Indication: pain, redness, " swelling s/p TKR revision    Comparison: January 17, 2025, January 29, 2025    Findings:  Status post knee arthroplasty. Hardware components appear in stable and expected positions. No definite acute hardware complication.    There appears to be a small to moderate knee effusion. There is anterior soft tissue prominence overlying the patella and extensor tendons. There also appears to be diffuse subcutaneous edema. No definite acute osseous abnormality.      Impression:      Impression:  1.Status post knee arthroplasty.  No definite acute hardware or osseous complication.  2.Nonspecific small to moderate knee effusion, subcutaneous edema, and anterior soft tissue prominence. Joint or soft tissue infection should be considered based upon patient's reported symptoms. Correlate with clinical findings, lab values, and consider   joint aspiration.          Electronically Signed: Adrien Alvarenga    2/24/2025 7:23 AM EST    Workstation ID: [id number]              Results for orders placed during the hospital encounter of 02/24/25    XR Knee 4+ View Left    Narrative  XR KNEE 4+ VW LEFT    Date of Exam: 2/24/2025 7:12 AM EST    Indication: pain, redness, swelling s/p TKR revision    Comparison: January 17, 2025, January 29, 2025    Findings:  Status post knee arthroplasty. Hardware components appear in stable and expected positions. No definite acute hardware complication.    There appears to be a small to moderate knee effusion. There is anterior soft tissue prominence overlying the patella and extensor tendons. There also appears to be diffuse subcutaneous edema. No definite acute osseous abnormality.    Impression  Impression:  1.Status post knee arthroplasty.  No definite acute hardware or osseous complication.  2.Nonspecific small to moderate knee effusion, subcutaneous edema, and anterior soft tissue prominence. Joint or soft tissue infection should be considered based upon patient's reported symptoms. Correlate with  clinical findings, lab values, and consider  joint aspiration.          Electronically Signed: Adrien Alvarenga  2/24/2025 7:23 AM EST  Workstation ID: [id number]      Results for orders placed during the hospital encounter of 01/29/25    XR Knee 1 or 2 View Left    Narrative  XR KNEE 1 OR 2 VW LEFT-    INDICATIONS: Postoperative evaluation.    TECHNIQUE: Frontal and lateral views of the left knee    COMPARISON: 1/17/2025    FINDINGS:    Intact appearing knee arthroplasty hardware is seen with adjacent  surgical soft tissue gas. No acute fracture is identified.    Impression  Postsurgical changes.        This report was finalized on 1/29/2025 2:23 PM by Dr. Keenan Hwang M.D on Workstation: [id number]      Results for orders placed in visit on 01/17/25    XR Knee 1 or 2 View Left    Narrative  XR KNEE 1 OR 2 VW LEFT-      INDICATION: Preoperative    COMPARISON: None    TECHNIQUE: 2 view left knee    FINDINGS:    Total knee arthroplasty. No lucency around the hardware. Normal  alignment. No fracture. No dislocation. Trace effusion.    Impression  1. Expected appearance of total left knee arthroplasty.  2. Trace knee effusion.    This report was finalized on 1/18/2025 1:14 PM by Dr. Federico Calderon M.D on Workstation: [id number]        Results for orders placed during the hospital encounter of 02/24/25    Duplex Venous Lower Extremity - Left CAR    Interpretation Summary    Normal left lower extremity venous duplex scan.      ASSESSMENT / PLAN      Knee pain    S/P revision of total knee, left    Essential tremor    Essential hypertension    Dehydration    NATALIO (acute kidney injury)    Fever    Generalized weakness    NATALIO-likely prerenal in setting of hypotension/diuretic and or ACE inhibitor use and with decreased p.o. intake..  Check UA, renal ultrasound to exclude any partial obstructive uropathy.  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        I discussed the patient's findings and my recommendations with patient    Will follow along closely. Thank you for allowing us to see this patient in renal consultation.    [facility]  [phone]  MD Cedric Hitchcock MD  02/24/25  14:39 EST

## 2025-02-24 NOTE — H&P
Patient Care Team:  Rolf Harvey MD as PCP - General (Family Medicine)    Chief complaint Left knee pain    Subjective     Patient is a 84 y.o. male who presents with PMH of hypertension, enlarged prostate, and hypertension. Patient had left knee replacement at the end of January and presents today with knee pain, fever, and falls. Information is given by son at bedside as he states his father has not had any rest and was given IV pain medication. He does state that his father does live alone and is alert and oriented. He states that he was doing well post operatively and going to outpatient therapy without an issues. Wednesday he even drove himself to therapy, but during therapy he thought he hurt himself. Son states after that session he had rapid decline laying around, not eating or drinking and continued to take pain medication. States that he had 3 falls. In the ED, he had a fever, creatinine elevated, CRP elevated, lactic normal, doppler of leg negative for clot. He did have joint aspiration per ortho.     Review of Systems   Unable to perform ROS: Other          History  Past Medical History:   Diagnosis Date    Enlarged prostate     Essential tremor     BOTH HANDS    Hypertension     Knee pain, left     Rotator cuff tear, left     Wears dentures      Past Surgical History:   Procedure Laterality Date    CATARACT EXTRACTION, BILATERAL      COLONOSCOPY      ELBOW OLECRANNON BURSA EXCISION Right     X2    ENDOSCOPY      KNEE MINI REVISION Left 1/29/2025    Procedure: LEFT  TOTAL KNEE ARTHROPLASTY REVISION POLYETHYLENE EXCHANGE;  Surgeon: Wilmer Bruce MD;  Location: [facility] OR [facility] [place];  Service: Orthopedics;  Laterality: Left;    SPINE SURGERY  2015    SPINAL STENOSIS    SPINE SURGERY  2016    RUPTURED DISC    TONSILLECTOMY AND ADENOIDECTOMY      AGES 20S    TOTAL KNEE ARTHROPLASTY Left 2008    VASECTOMY       Family History   Problem Relation Age of Onset    Malig Hyperthermia Neg Hx      Social History      Tobacco Use    Smoking status: Former     Current packs/day: 0.00     Types: Cigarettes     Quit date:      Years since quittin.1    Smokeless tobacco: Never   Vaping Use    Vaping status: Never Used   Substance Use Topics    Alcohol use: Yes     Comment: OCCASIONAL    Drug use: Never     (Not in a hospital admission)    Allergies:  Patient has no known allergies.    Objective     Vital Signs  Temp:  [100.9 °F (38.3 °C)] 100.9 °F (38.3 °C)  Heart Rate:  [55-74] 55  Resp:  [16] 16  BP: (100-121)/(52-72) 102/52       Physical Exam  Constitutional:       General: He is sleeping.      Appearance: He is ill-appearing.   HENT:      Head: Normocephalic and atraumatic.      Right Ear: External ear normal.      Left Ear: External ear normal.      Nose: Nose normal.      Mouth/Throat:      Mouth: Mucous membranes are dry.   Eyes:      General:         Right eye: No discharge.         Left eye: No discharge.   Cardiovascular:      Rate and Rhythm: Normal rate and regular rhythm.      Pulses: Normal pulses.      Heart sounds: Normal heart sounds.   Pulmonary:      Effort: Pulmonary effort is normal.      Breath sounds: Normal breath sounds.   Abdominal:      General: Bowel sounds are normal.      Palpations: Abdomen is soft.   Musculoskeletal:         General: Swelling and tenderness present. Normal range of motion.      Cervical back: Normal range of motion.   Skin:     General: Skin is dry.   Neurological:      Mental Status: Mental status is at baseline.   Psychiatric:         Behavior: Behavior normal.          Results Review:     Imaging Results (Last 24 Hours)       Procedure Component Value Units Date/Time    XR Knee 4+ View Left [[id number]] Collected: 25     Updated: 25    Narrative:      XR KNEE 4+ VW LEFT    Date of Exam: 2025 7:12 AM EST    Indication: pain, redness, swelling s/p TKR revision    Comparison: 2025, 2025    Findings:  Status post knee  arthroplasty. Hardware components appear in stable and expected positions. No definite acute hardware complication.    There appears to be a small to moderate knee effusion. There is anterior soft tissue prominence overlying the patella and extensor tendons. There also appears to be diffuse subcutaneous edema. No definite acute osseous abnormality.      Impression:      Impression:  1.Status post knee arthroplasty.  No definite acute hardware or osseous complication.  2.Nonspecific small to moderate knee effusion, subcutaneous edema, and anterior soft tissue prominence. Joint or soft tissue infection should be considered based upon patient's reported symptoms. Correlate with clinical findings, lab values, and consider   joint aspiration.          Electronically Signed: Adrien Alvarenga    2/24/2025 7:23 AM EST    Workstation ID: [id number]             Lab Results (last 24 hours)       Procedure Component Value Units Date/Time    COVID-19 and FLU A/B PCR, 1 HR TAT - Swab, Nasopharynx [[id number]]  (Normal) Collected: 02/24/25 1128    Specimen: Swab from Nasopharynx Updated: 02/24/25 1154     COVID19 Not Detected     Influenza A PCR Not Detected     Influenza B PCR Not Detected    Narrative:      Fact sheet for providers: https://www.fda.gov/media/859060/download    Fact sheet for patients: https://www.fda.gov/media/135534/download    Test performed by PCR.    Body Fluid Cell Count With Differential - Synovial Fluid, Knee, Left [[id number]]  (Abnormal) Collected: 02/24/25 1000    Specimen: Synovial Fluid from Knee, Left Updated: 02/24/25 1143    Narrative:      The following orders were created for panel order Body Fluid Cell Count With Differential - Synovial Fluid, Knee, Left.  Procedure                               Abnormality         Status                     ---------                               -----------         ------                     Body fluid cell count - ...[[id number]]  Abnormal            Final result                Body fluid differential ...[[id number]]                      Final result                 Please view results for these tests on the individual orders.    Body fluid differential - Synovial Fluid, Knee, Left [[id number]] Collected: 02/24/25 1000    Specimen: Synovial Fluid from Knee, Left Updated: 02/24/25 1143     Neutrophils, Fluid % 95 %      Lymphocytes, Fluid % 2 %      Monocytes, Fluid % 3 %     Narrative:      Concentrated Smear by Cytocentrifuge Prep.    Body Fluid Culture - Synovial Fluid, Knee, Left [[id number]] Collected: 02/24/25 1000    Specimen: Synovial Fluid from Knee, Left Updated: 02/24/25 1051     Gram Stain Many (4+) WBCs per low power field      No organisms seen    Body fluid cell count - Synovial Fluid, Knee, Left [[id number]]  (Abnormal) Collected: 02/24/25 1000    Specimen: Synovial Fluid from Knee, Left Updated: 02/24/25 1050     Color, Fluid Orange     Appearance, Fluid Cloudy     Nucleated Cells, Fluid 92,390 /mm3      Method: Automated Onyu XN Method    Narrative:      No reference range established. Physician to interpret results with clinical findings.    Crystal Exam, Fluid - Synovial Fluid, [[id number]] Collected: 02/24/25 1000    Specimen: Synovial Fluid Updated: 02/24/25 1030     Crystals, Fluid No crystals seen    Glucose, Body Fluid - Synovial Fluid, Knee, Left [[id number]] Collected: 02/24/25 1000    Specimen: Synovial Fluid from Knee, Left Updated: 02/24/25 1004    Protein, Body Fluid - Synovial Fluid, Knee, Left [[id number]] Collected: 02/24/25 1000    Specimen: Synovial Fluid from Knee, Left Updated: 02/24/25 1004    Comprehensive Metabolic Panel [[id number]]  (Abnormal) Collected: 02/24/25 0723    Specimen: Blood Updated: 02/24/25 0847     Glucose 136 mg/dL      BUN 37 mg/dL      Creatinine 1.96 mg/dL      Sodium 137 mmol/L      Potassium 3.5 mmol/L      Chloride 100 mmol/L      CO2 24.7 mmol/L      Calcium 9.0 mg/dL      Total Protein 6.3 g/dL      Albumin 3.7 g/dL       ALT (SGPT) 131 U/L      AST (SGOT) 138 U/L      Alkaline Phosphatase 69 U/L      Total Bilirubin 1.2 mg/dL      Globulin 2.6 gm/dL      A/G Ratio 1.4 g/dL      BUN/Creatinine Ratio 18.9     Anion Gap 12.3 mmol/L      eGFR 33.5 mL/min/1.73     Narrative:      GFR Categories in Chronic Kidney Disease (CKD)      GFR Category          GFR (mL/min/1.73)    Interpretation  G1                     90 or greater         Normal or high (1)  G2                      60-89                Mild decrease (1)  G3a                   45-59                Mild to moderate decrease  G3b                   30-44                Moderate to severe decrease  G4                    15-29                Severe decrease  G5                    14 or less           Kidney failure          (1)In the absence of evidence of kidney disease, neither GFR category G1 or G2 fulfill the criteria for CKD.    eGFR calculation 2021 CKD-EPI creatinine equation, which does not include race as a factor    C-reactive Protein [[id number]]  (Abnormal) Collected: 02/24/25 0723    Specimen: Blood Updated: 02/24/25 0847     C-Reactive Protein 22.50 mg/dL     Blood Culture - Blood, Arm, Left [[id number]] Collected: 02/24/25 0820    Specimen: Blood from Arm, Left Updated: 02/24/25 0825    Sedimentation Rate [[id number]]  (Normal) Collected: 02/24/25 0723    Specimen: Blood Updated: 02/24/25 0758     Sed Rate 20 mm/hr     Protime-INR [[id number]]  (Abnormal) Collected: 02/24/25 0723    Specimen: Blood Updated: 02/24/25 0750     Protime 14.8 Seconds      INR 1.16    aPTT [[id number]]  (Normal) Collected: 02/24/25 0723    Specimen: Blood Updated: 02/24/25 0750     PTT 31.2 seconds     CBC & Differential [[id number]]  (Abnormal) Collected: 02/24/25 0723    Specimen: Blood Updated: 02/24/25 0744    Narrative:      The following orders were created for panel order CBC & Differential.  Procedure                               Abnormality         Status                     ---------                                -----------         ------                     CBC Auto Differential[[id number]]        Abnormal            Final result                 Please view results for these tests on the individual orders.    CBC Auto Differential [[id number]]  (Abnormal) Collected: 02/24/25 0723    Specimen: Blood Updated: 02/24/25 0744     WBC 8.51 10*3/mm3      RBC 4.60 10*6/mm3      Hemoglobin 14.1 g/dL      Hematocrit 41.9 %      MCV 91.1 fL      MCH 30.7 pg      MCHC 33.7 g/dL      RDW 12.5 %      RDW-SD 41.7 fl      MPV 11.3 fL      Platelets 184 10*3/mm3      Neutrophil % 82.1 %      Lymphocyte % 6.0 %      Monocyte % 11.2 %      Eosinophil % 0.0 %      Basophil % 0.2 %      Immature Grans % 0.5 %      Neutrophils, Absolute 6.99 10*3/mm3      Lymphocytes, Absolute 0.51 10*3/mm3      Monocytes, Absolute 0.95 10*3/mm3      Eosinophils, Absolute 0.00 10*3/mm3      Basophils, Absolute 0.02 10*3/mm3      Immature Grans, Absolute 0.04 10*3/mm3      nRBC 0.0 /100 WBC     POC CHEM 8 [[id number]]  (Abnormal) Collected: 02/24/25 0730    Specimen: Venous Blood Updated: 02/24/25 0733     Glucose 135 mg/dL      BUN 37 mg/dL      Creatinine 2.10 mg/dL      Sodium 136 mmol/L      POC Potassium 3.3 mmol/L      Chloride 99 mmol/L      Total CO2 24 mmol/L      Anion Gap 16.0 mmol/L      Comment: Serial Number: [id number]:  [id number]        Hemoglobin 14.3 g/dL      Hematocrit 42 %      Ionized Calcium 1.12 mmol/L      eGFR 30.8 mL/min/1.73     POC Lactate [[id number]]  (Normal) Collected: 02/24/25 0725    Specimen: Blood Updated: 02/24/25 0726     Lactate 0.9 mmol/L      Comment: Serial Number: [id number]:  [id number]       Blood Culture - Blood, Arm, Left [[id number]] Collected: 02/24/25 0723    Specimen: Blood from Arm, Left Updated: 02/24/25 0724             I reviewed the patient's new clinical results.    Assessment & Plan     Left knee pain  Fever  Dehydration  NATALIO  Falls  General weakness  S/p revision of total  knee, left  Essential tremor  Essential hypertension    Plan of care  Ortho asked ED to perform joint aspiration; pending. Respiratory panel negative. Fever, CRP elevated.   Dehydration/NATALIO nephrology consult and give 1 liter NS now and continue with IV fluids.   Will hold BB as hr bruce and pressures soft.  Will continue rocephin iv for now and have MRSA swab obtained  He will need PT as he improves    He will need sleep study as outpatient as witnessed apnea events while resting       I discussed the patient's findings and my recommendations with patient.     Radha Garner, GONZALO  02/24/25  12:13 EST

## 2025-02-24 NOTE — Clinical Note
Level of Care: Med/Surg [1]   Admitting Physician: BIRDIE DICKINSON [[id number]]   Attending Physician: BIRDIE DICKINSON [[id number]]

## 2025-02-24 NOTE — LETTER
EMS Transport Request  For use at [facility] [place], [place], [place], and [place] only   Patient Name: Juan Marley : 1940   Weight:87 kg (191 lb 12.8 oz) Pick-up Location: [facility] BLS/ALS: BLS/ALS: BLS   Insurance: [employer] MEDICARE REPLACEMENT Auth End Date: 3.5.25   Pre-Cert #: D/C Summary complete:    Destination: Other [facility]  [address] [place] [id number]  [address] REPORT GIVEN TO MERRITT    Contact Precautions: None   Equipment (O2, Fluids, etc.): None   Arrive By Date/Time: 3.5.2025 WILL CALL  Stretcher/WC: Wheelchair   CM Requesting: Nida Cardenas RN Ext: [phone]   Notes/Medical Necessity: FACILITY/FAMILY UNABLE TO TRANSPORT. ABLE TO TRANSFER TO WHEELCHAIR ALERT AND ORIENTED        ______________________________________________________________________    *Only 2 patient bags OR 1 carry-on size bag are permitted.  Wheelchairs and walkers CANNOT transported with the patient. Acknowledge: Yes

## 2025-02-24 NOTE — ED PROVIDER NOTES
Subjective   History of Present Illness  Patient is an 85-year-old white male with a history of hypertension presents today with complaints of pain redness and swelling to the left knee.  He states he is approximately 4 weeks status post revision of left total knee replacement by Dr. Bruce.  States he been doing fairly well until the last week or so he had some increasing pain.  He states at 1 point he did have some drainage from the superior aspect of the incision which is since resolved but now he has swelling and redness with increasing pain and decreased range of motion.  He denies any fever but states he has had some chills.  No numbness tingling or weakness distal to the knee.      Review of Systems   Constitutional:  Positive for chills. Negative for fever.   HENT:  Negative for congestion.    Respiratory:  Negative for cough and shortness of breath.    Cardiovascular:  Negative for chest pain.   Musculoskeletal:         Pain, redness, swelling left knee       Past Medical History:   Diagnosis Date    Enlarged prostate     Essential tremor     BOTH HANDS    Hypertension     Knee pain, left     Rotator cuff tear, left     Wears dentures        No Known Allergies    Past Surgical History:   Procedure Laterality Date    CATARACT EXTRACTION, BILATERAL      COLONOSCOPY      ELBOW OLECRANNON BURSA EXCISION Right     X2    ENDOSCOPY      KNEE MINI REVISION Left 1/29/2025    Procedure: LEFT  TOTAL KNEE ARTHROPLASTY REVISION POLYETHYLENE EXCHANGE;  Surgeon: Wilmer Bruce MD;  Location: [facility] OR [facility];  Service: Orthopedics;  Laterality: Left;    SPINE SURGERY  2015    SPINAL STENOSIS    SPINE SURGERY  2016    RUPTURED DISC    TONSILLECTOMY AND ADENOIDECTOMY      AGES 20S    TOTAL KNEE ARTHROPLASTY Left 2008    VASECTOMY         Family History   Problem Relation Age of Onset    Malig Hyperthermia Neg Hx        Social History     Socioeconomic History    Marital status:    Tobacco Use    Smoking status: Former      Current packs/day: 0.00     Types: Cigarettes     Quit date:      Years since quittin.1    Smokeless tobacco: Never   Vaping Use    Vaping status: Never Used   Substance and Sexual Activity    Alcohol use: Yes     Comment: OCCASIONAL    Drug use: Never    Sexual activity: Defer           Objective   Physical Exam  Vital signs and triage nurse note reviewed.  Constitutional: Awake, alert; well-developed and well-nourished. No acute distress is noted.  Cardiovascular: Regular rate and rhythm, normal S1-S2.  No murmur noted.  Pulmonary: Respiratory effort regular nonlabored, breath sounds clear to auscultation all fields.  Musculoskeletal: Independent range of motion of all extremities.  There is decreased range of motion of the left knee.  There is diffuse edema noted throughout the left knee and lower leg.  There is some bogginess anteriorly with suspected effusion.  There is diffuse erythema and warmth noted throughout the anterior, lateral and medial aspects of the knee.  There is no crepitus.  No calf tenderness.  There is good cap refill and sensation distally.  Anterior surgical incision is well-approximated with some scabbing noted.  There is no bleeding or drainage.  Skin: Flesh tone, warm, dry    Arthocentesis    Date/Time: 2025 10:00 AM    Performed by: Gala Lin APRN  Authorized by: Gala Lin APRN    Consent:     Consent obtained:  Verbal    Consent given by:  Patient    Risks, benefits, and alternatives were discussed: yes      Risks discussed:  Bleeding, infection and pain    Alternatives discussed:  Referral  Universal protocol:     Immediately prior to procedure, a time out was called: yes      Patient identity confirmed:  Verbally with patient and arm band  Location:     Location:  Knee    Knee:  L knee  Anesthesia:     Anesthesia method:  Local infiltration    Local anesthetic:  Lidocaine 1% WITH epi  Procedure details:     Preparation: Patient was prepped and draped in  usual sterile fashion      Needle gauge:  18 G    Approach:  Lateral    Aspirate amount:  100cc    Aspirate characteristics:  Cloudy (estephania colored)    Steroid injected: no      Specimen collected: yes    Post-procedure details:     Dressing:  Adhesive bandage    Procedure completion:  Tolerated well, no immediate complications             ED Course  ED Course as of 02/24/25 1226   Mon Feb 24, 2025   0807 Venous doppler negative. [MD]      ED Course User Index  [MD] Gala Lin APRN      Labs Reviewed   COMPREHENSIVE METABOLIC PANEL - Abnormal; Notable for the following components:       Result Value    Glucose 136 (*)     BUN 37 (*)     Creatinine 1.96 (*)     ALT (SGPT) 131 (*)     AST (SGOT) 138 (*)     eGFR 33.5 (*)     All other components within normal limits    Narrative:     GFR Categories in Chronic Kidney Disease (CKD)      GFR Category          GFR (mL/min/1.73)    Interpretation  G1                     90 or greater         Normal or high (1)  G2                      60-89                Mild decrease (1)  G3a                   45-59                Mild to moderate decrease  G3b                   30-44                Moderate to severe decrease  G4                    15-29                Severe decrease  G5                    14 or less           Kidney failure          (1)In the absence of evidence of kidney disease, neither GFR category G1 or G2 fulfill the criteria for CKD.    eGFR calculation 2021 CKD-EPI creatinine equation, which does not include race as a factor   PROTIME-INR - Abnormal; Notable for the following components:    Protime 14.8 (*)     INR 1.16 (*)     All other components within normal limits   C-REACTIVE PROTEIN - Abnormal; Notable for the following components:    C-Reactive Protein 22.50 (*)     All other components within normal limits   CBC WITH AUTO DIFFERENTIAL - Abnormal; Notable for the following components:    Neutrophil % 82.1 (*)     Lymphocyte % 6.0 (*)     Eosinophil  % 0.0 (*)     Lymphocytes, Absolute 0.51 (*)     Monocytes, Absolute 0.95 (*)     All other components within normal limits   BODY FLUID CELL COUNT - Abnormal; Notable for the following components:    Appearance, Fluid Cloudy (*)     All other components within normal limits    Narrative:     No reference range established. Physician to interpret results with clinical findings.   POCT CHEM 8 - Abnormal; Notable for the following components:    Glucose 135 (*)     BUN 37 (*)     Creatinine 2.10 (*)     Sodium 136 (*)     POC Potassium 3.3 (*)     eGFR 30.8 (*)     All other components within normal limits   COVID-19 AND FLU A/B, NP SWAB IN TRANSPORT MEDIA 1 HR TAT - Normal    Narrative:     Fact sheet for providers: https://www.fda.gov/media/277122/download    Fact sheet for patients: https://www.fda.gov/media/177398/download    Test performed by PCR.   APTT - Normal   SEDIMENTATION RATE - Normal   POC LACTATE - Normal   BODY FLUID CULTURE   BLOOD CULTURE   BLOOD CULTURE   MRSA SCREEN, PCR   BODY FLUID CELL COUNT WITH DIFFERENTIAL    Narrative:     The following orders were created for panel order Body Fluid Cell Count With Differential - Synovial Fluid, Knee, Left.  Procedure                               Abnormality         Status                     ---------                               -----------         ------                     Body fluid cell count - ...[[id number]]  Abnormal            Final result               Body fluid differential ...[[id number]]                      Final result                 Please view results for these tests on the individual orders.   CRYSTAL EXAM   BODY FLUID DIFFERENTIAL    Narrative:     Concentrated Smear by Cytocentrifuge Prep.   GLUCOSE, BODY FLUID   PROTEIN, BODY FLUID   CBC AND DIFFERENTIAL    Narrative:     The following orders were created for panel order CBC & Differential.  Procedure                               Abnormality         Status                      ---------                               -----------         ------                     CBC Auto Differential[[id number]]        Abnormal            Final result                 Please view results for these tests on the individual orders.     XR Knee 4+ View Left    Result Date: 2/24/2025  Impression: 1.Status post knee arthroplasty.  No definite acute hardware or osseous complication. 2.Nonspecific small to moderate knee effusion, subcutaneous edema, and anterior soft tissue prominence. Joint or soft tissue infection should be considered based upon patient's reported symptoms. Correlate with clinical findings, lab values, and consider  joint aspiration. Electronically Signed: Adrien Alvarenga  2/24/2025 7:23 AM EST  Workstation ID: [id number]   Medications   sodium chloride 0.9 % flush 10 mL (10 mL Intravenous Given 2/24/25 1129)   sodium chloride 0.9 % flush 10 mL (has no administration in time range)   sodium chloride 0.9 % infusion 40 mL (has no administration in time range)   Enoxaparin Sodium (LOVENOX) syringe 40 mg (has no administration in time range)   nitroglycerin (NITROSTAT) SL tablet 0.4 mg (has no administration in time range)   Potassium Replacement - Follow Nurse / BPA Driven Protocol (has no administration in time range)   Magnesium Standard Dose Replacement - Follow Nurse / BPA Driven Protocol (has no administration in time range)   Phosphorus Replacement - Follow Nurse / BPA Driven Protocol (has no administration in time range)   Calcium Replacement - Follow Nurse / BPA Driven Protocol (has no administration in time range)   acetaminophen (TYLENOL) tablet 650 mg (has no administration in time range)     Or   acetaminophen (TYLENOL) 160 MG/5ML oral solution 650 mg (has no administration in time range)     Or   acetaminophen (TYLENOL) suppository 650 mg (has no administration in time range)   melatonin tablet 5 mg (has no administration in time range)   sennosides-docusate (PERICOLACE) 8.6-50 MG per  tablet 2 tablet (has no administration in time range)     And   polyethylene glycol (MIRALAX) packet 17 g (has no administration in time range)     And   bisacodyl (DULCOLAX) EC tablet 5 mg (has no administration in time range)     And   bisacodyl (DULCOLAX) suppository 10 mg (has no administration in time range)   ondansetron ODT (ZOFRAN-ODT) disintegrating tablet 4 mg (has no administration in time range)     Or   ondansetron (ZOFRAN) injection 4 mg (has no administration in time range)   hydrALAZINE (APRESOLINE) injection 10 mg (has no administration in time range)   sodium chloride 0.9 % bolus 1,000 mL (has no administration in time range)   cefTRIAXone (ROCEPHIN) 1,000 mg in sodium chloride 0.9 % 100 mL MBP (has no administration in time range)   ondansetron (ZOFRAN) injection 4 mg (4 mg Intravenous Given 2/24/25 0738)   HYDROmorphone (DILAUDID) injection 0.5 mg (0.5 mg Intravenous Given 2/24/25 0738)   cefTRIAXone (ROCEPHIN) 2,000 mg in sodium chloride 0.9 % 100 mL MBP (0 mg Intravenous Stopped 2/24/25 0951)   vancomycin IVPB 1750 mg in 0.9% Sodium Chloride (premix) 500 mL (0 mg Intravenous Stopped 2/24/25 1216)   acetaminophen (TYLENOL) tablet 1,000 mg (1,000 mg Oral Given 2/24/25 1009)   sodium chloride 0.9 % bolus 500 mL (500 mL Intravenous New Bag 2/24/25 1129)                                                      Medical Decision Making  Patient presents today with the above complaint.    He had the above exam and evaluation.  IV was established.  Labs x-ray and venous Doppler were obtained.  He was given IV Dilaudid for pain.    Workup: CBC reveals a normal white blood cell count 8.5, 82% neutrophils, no bands.  Metabolic panel significant for glucose 136, BUN 37, creatinine 1.96, , .  Sed rate 20, CRP elevated at 22.5.  POC lactate within normal limits at 0.9.  COVID flu both negative.  Synovial fluid was sent for testing and was significant for cloudy orange-colored fluid with 92,000  nucleated cells, Gram stain with many 4+ WBCs, no crystals.  X-ray of the left knee shows status post knee arthroplasty, no definite acute hardware or osseous complication.  Nonspecific small to moderate knee effusion and subcu edema anterior soft tissue prominence.  Venous Doppler was negative for DVT or SVT.    Patient was given a dose of IV Rocephin as well as vancomycin.  He was given a small fluid bolus.    Case was discussed with Dr. Bruce, who would like the patient to have a knee joint aspiration for fluid collection here in the ED.  This was performed as noted above and fluid studies were sent to the lab as noted above.  There is concern for infection given exam and above lab findings.  Findings were discussed with the patient.  He will be admitted to the hospital for further management.  Case was discussed with Radha nurse practitioner on-call for [employer].      Amount and/or Complexity of Data Reviewed  Labs: ordered.  Radiology: ordered.    Risk  Prescription drug management.  Decision regarding hospitalization.        Final diagnoses:   Acute pain of left knee   Pyogenic arthritis of left knee joint, due to unspecified organism   S/P revision of total knee, left   Acute kidney injury       ED Disposition  ED Disposition       ED Disposition   Decision to Admit    Condition   --    Comment   Level of Care: Telemetry [5]   Diagnosis: Knee pain [096059]   Certification: I Certify That Inpatient Hospital Services Are Medically Necessary For Greater Than 2 Midnights                 No follow-up provider specified.       Medication List      No changes were made to your prescriptions during this visit.            Gala Lin, APRN  02/24/25 4859

## 2025-02-24 NOTE — PLAN OF CARE
Goal Outcome Evaluation:   Pt admission assessment done and history taken, VSS, pt food ordered, no concerns at this time, will continue to monitor

## 2025-02-25 ENCOUNTER — APPOINTMENT (OUTPATIENT)
Dept: GENERAL RADIOLOGY | Facility: HOSPITAL | Age: 85
End: 2025-02-25
Payer: MEDICARE

## 2025-02-25 ENCOUNTER — APPOINTMENT (OUTPATIENT)
Dept: CT IMAGING | Facility: HOSPITAL | Age: 85
End: 2025-02-25
Payer: MEDICARE

## 2025-02-25 ENCOUNTER — ANESTHESIA (OUTPATIENT)
Dept: PERIOP | Facility: HOSPITAL | Age: 85
End: 2025-02-25
Payer: MEDICARE

## 2025-02-25 ENCOUNTER — APPOINTMENT (OUTPATIENT)
Dept: ULTRASOUND IMAGING | Facility: HOSPITAL | Age: 85
End: 2025-02-25
Payer: MEDICARE

## 2025-02-25 PROBLEM — R41.0 CONFUSION: Status: ACTIVE | Noted: 2025-02-25

## 2025-02-25 PROBLEM — M00.9 PYOGENIC ARTHRITIS OF LEFT KNEE JOINT: Status: ACTIVE | Noted: 2025-02-25

## 2025-02-25 PROBLEM — R79.89 ABNORMAL LIVER FUNCTION TEST: Status: ACTIVE | Noted: 2025-02-25

## 2025-02-25 LAB
ABO GROUP BLD: NORMAL
ALBUMIN SERPL-MCNC: 3.2 G/DL (ref 3.5–5.2)
ALBUMIN SERPL-MCNC: 3.2 G/DL (ref 3.5–5.2)
ALP SERPL-CCNC: 57 U/L (ref 39–117)
ALT SERPL W P-5'-P-CCNC: 99 U/L (ref 1–41)
AMMONIA BLD-SCNC: 34 UMOL/L (ref 16–60)
ANION GAP SERPL CALCULATED.3IONS-SCNC: 9.1 MMOL/L (ref 5–15)
APTT PPP: 34.3 SECONDS (ref 22.7–35.4)
AST SERPL-CCNC: 86 U/L (ref 1–40)
BACTERIA UR QL AUTO: NORMAL /HPF
BASOPHILS # BLD AUTO: 0.02 10*3/MM3 (ref 0–0.2)
BASOPHILS NFR BLD AUTO: 0.3 % (ref 0–1.5)
BILIRUB CONJ SERPL-MCNC: 0.4 MG/DL (ref 0–0.3)
BILIRUB INDIRECT SERPL-MCNC: 0.4 MG/DL
BILIRUB SERPL-MCNC: 0.8 MG/DL (ref 0–1.2)
BILIRUB UR QL STRIP: NEGATIVE
BLD GP AB SCN SERPL QL: NEGATIVE
BUN SERPL-MCNC: 41 MG/DL (ref 8–23)
BUN/CREAT SERPL: 24.4 (ref 7–25)
CALCIUM SPEC-SCNC: 7.9 MG/DL (ref 8.6–10.5)
CHLORIDE SERPL-SCNC: 104 MMOL/L (ref 98–107)
CLARITY UR: CLEAR
CO2 SERPL-SCNC: 22.9 MMOL/L (ref 22–29)
COLOR UR: ABNORMAL
CREAT SERPL-MCNC: 1.68 MG/DL (ref 0.76–1.27)
DEPRECATED RDW RBC AUTO: 43.7 FL (ref 37–54)
EGFRCR SERPLBLD CKD-EPI 2021: 40.3 ML/MIN/1.73
EOSINOPHIL # BLD AUTO: 0.1 10*3/MM3 (ref 0–0.4)
EOSINOPHIL NFR BLD AUTO: 1.4 % (ref 0.3–6.2)
ERYTHROCYTE [DISTWIDTH] IN BLOOD BY AUTOMATED COUNT: 12.9 % (ref 12.3–15.4)
GLUCOSE SERPL-MCNC: 119 MG/DL (ref 65–99)
GLUCOSE UR STRIP-MCNC: NEGATIVE MG/DL
HCT VFR BLD AUTO: 38.9 % (ref 37.5–51)
HGB BLD-MCNC: 12.8 G/DL (ref 13–17.7)
HGB UR QL STRIP.AUTO: NEGATIVE
HYALINE CASTS UR QL AUTO: NORMAL /LPF
IMM GRANULOCYTES # BLD AUTO: 0.04 10*3/MM3 (ref 0–0.05)
IMM GRANULOCYTES NFR BLD AUTO: 0.6 % (ref 0–0.5)
INR PPP: 1.23 (ref 0.9–1.1)
KETONES UR QL STRIP: NEGATIVE
LEUKOCYTE ESTERASE UR QL STRIP.AUTO: NEGATIVE
LYMPHOCYTES # BLD AUTO: 0.39 10*3/MM3 (ref 0.7–3.1)
LYMPHOCYTES NFR BLD AUTO: 5.6 % (ref 19.6–45.3)
MAGNESIUM SERPL-MCNC: 2.4 MG/DL (ref 1.6–2.4)
MCH RBC QN AUTO: 30.5 PG (ref 26.6–33)
MCHC RBC AUTO-ENTMCNC: 32.9 G/DL (ref 31.5–35.7)
MCV RBC AUTO: 92.6 FL (ref 79–97)
MONOCYTES # BLD AUTO: 0.53 10*3/MM3 (ref 0.1–0.9)
MONOCYTES NFR BLD AUTO: 7.7 % (ref 5–12)
NEUTROPHILS NFR BLD AUTO: 5.84 10*3/MM3 (ref 1.7–7)
NEUTROPHILS NFR BLD AUTO: 84.4 % (ref 42.7–76)
NITRITE UR QL STRIP: NEGATIVE
NRBC BLD AUTO-RTO: 0 /100 WBC (ref 0–0.2)
PH UR STRIP.AUTO: 5.5 [PH] (ref 5–8)
PHOSPHATE SERPL-MCNC: 2 MG/DL (ref 2.5–4.5)
PHOSPHATE SERPL-MCNC: 3.5 MG/DL (ref 2.5–4.5)
PLATELET # BLD AUTO: 173 10*3/MM3 (ref 140–450)
PMV BLD AUTO: 11 FL (ref 6–12)
POTASSIUM SERPL-SCNC: 4.1 MMOL/L (ref 3.5–5.2)
PROT SERPL-MCNC: 5.6 G/DL (ref 6–8.5)
PROT UR QL STRIP: ABNORMAL
PROTHROMBIN TIME: 15.5 SECONDS (ref 11.7–14.2)
QT INTERVAL: 375 MS
QTC INTERVAL: 401 MS
RBC # BLD AUTO: 4.2 10*6/MM3 (ref 4.14–5.8)
RBC # UR STRIP: NORMAL /HPF
REF LAB TEST METHOD: NORMAL
RH BLD: POSITIVE
SODIUM SERPL-SCNC: 136 MMOL/L (ref 136–145)
SP GR UR STRIP: 1.03 (ref 1–1.03)
SQUAMOUS #/AREA URNS HPF: NORMAL /HPF
T&S EXPIRATION DATE: NORMAL
URATE SERPL-MCNC: 6.1 MG/DL (ref 3.4–7)
UROBILINOGEN UR QL STRIP: ABNORMAL
WBC # UR STRIP: NORMAL /HPF
WBC NRBC COR # BLD AUTO: 6.92 10*3/MM3 (ref 3.4–10.8)

## 2025-02-25 PROCEDURE — 84100 ASSAY OF PHOSPHORUS: CPT | Performed by: ORTHOPAEDIC SURGERY

## 2025-02-25 PROCEDURE — 25010000002 DEXAMETHASONE PER 1 MG: Performed by: NURSE ANESTHETIST, CERTIFIED REGISTERED

## 2025-02-25 PROCEDURE — 87205 SMEAR GRAM STAIN: CPT | Performed by: ORTHOPAEDIC SURGERY

## 2025-02-25 PROCEDURE — 25010000002 LIDOCAINE PF 2% 2 % SOLUTION: Performed by: NURSE ANESTHETIST, CERTIFIED REGISTERED

## 2025-02-25 PROCEDURE — 85025 COMPLETE CBC W/AUTO DIFF WBC: CPT | Performed by: INTERNAL MEDICINE

## 2025-02-25 PROCEDURE — 25010000002 CEFTAZIDIME 2 G RECONSTITUTED SOLUTION 1 EACH VIAL: Performed by: NURSE PRACTITIONER

## 2025-02-25 PROCEDURE — 25010000002 HYDROMORPHONE PER 4 MG: Performed by: ORTHOPAEDIC SURGERY

## 2025-02-25 PROCEDURE — 25010000002 EPINEPHRINE 1 MG/ML SOLUTION: Performed by: ORTHOPAEDIC SURGERY

## 2025-02-25 PROCEDURE — 70450 CT HEAD/BRAIN W/O DYE: CPT

## 2025-02-25 PROCEDURE — 84550 ASSAY OF BLOOD/URIC ACID: CPT | Performed by: INTERNAL MEDICINE

## 2025-02-25 PROCEDURE — 93010 ELECTROCARDIOGRAM REPORT: CPT | Performed by: INTERNAL MEDICINE

## 2025-02-25 PROCEDURE — 25810000003 SODIUM CHLORIDE 0.9 % SOLUTION: Performed by: NURSE ANESTHETIST, CERTIFIED REGISTERED

## 2025-02-25 PROCEDURE — 93005 ELECTROCARDIOGRAM TRACING: CPT | Performed by: INTERNAL MEDICINE

## 2025-02-25 PROCEDURE — 25010000002 PROPOFOL 10 MG/ML EMULSION: Performed by: NURSE ANESTHETIST, CERTIFIED REGISTERED

## 2025-02-25 PROCEDURE — C1776 JOINT DEVICE (IMPLANTABLE): HCPCS | Performed by: ORTHOPAEDIC SURGERY

## 2025-02-25 PROCEDURE — 87075 CULTR BACTERIA EXCEPT BLOOD: CPT | Performed by: ORTHOPAEDIC SURGERY

## 2025-02-25 PROCEDURE — 25010000002 HYDROMORPHONE 1 MG/ML SOLUTION: Performed by: NURSE ANESTHETIST, CERTIFIED REGISTERED

## 2025-02-25 PROCEDURE — 86850 RBC ANTIBODY SCREEN: CPT | Performed by: INTERNAL MEDICINE

## 2025-02-25 PROCEDURE — 84100 ASSAY OF PHOSPHORUS: CPT | Performed by: INTERNAL MEDICINE

## 2025-02-25 PROCEDURE — 25010000002 CEFTRIAXONE PER 250 MG: Performed by: NURSE PRACTITIONER

## 2025-02-25 PROCEDURE — 25010000002 MAGNESIUM SULFATE PER 500 MG OF MAGNESIUM: Performed by: NURSE ANESTHETIST, CERTIFIED REGISTERED

## 2025-02-25 PROCEDURE — 25010000002 FENTANYL CITRATE (PF) 50 MCG/ML SOLUTION: Performed by: NURSE ANESTHETIST, CERTIFIED REGISTERED

## 2025-02-25 PROCEDURE — 80053 COMPREHEN METABOLIC PANEL: CPT | Performed by: INTERNAL MEDICINE

## 2025-02-25 PROCEDURE — 25010000002 CLONIDINE PER 1 MG: Performed by: ORTHOPAEDIC SURGERY

## 2025-02-25 PROCEDURE — 83735 ASSAY OF MAGNESIUM: CPT | Performed by: INTERNAL MEDICINE

## 2025-02-25 PROCEDURE — 87070 CULTURE OTHR SPECIMN AEROBIC: CPT | Performed by: ORTHOPAEDIC SURGERY

## 2025-02-25 PROCEDURE — 82140 ASSAY OF AMMONIA: CPT | Performed by: INTERNAL MEDICINE

## 2025-02-25 PROCEDURE — 0SPD09Z REMOVAL OF LINER FROM LEFT KNEE JOINT, OPEN APPROACH: ICD-10-PCS | Performed by: ORTHOPAEDIC SURGERY

## 2025-02-25 PROCEDURE — 25010000002 ROPIVACAINE PER 1 MG: Performed by: ORTHOPAEDIC SURGERY

## 2025-02-25 PROCEDURE — C1713 ANCHOR/SCREW BN/BN,TIS/BN: HCPCS | Performed by: ORTHOPAEDIC SURGERY

## 2025-02-25 PROCEDURE — 76705 ECHO EXAM OF ABDOMEN: CPT

## 2025-02-25 PROCEDURE — 71045 X-RAY EXAM CHEST 1 VIEW: CPT

## 2025-02-25 PROCEDURE — 85610 PROTHROMBIN TIME: CPT | Performed by: INTERNAL MEDICINE

## 2025-02-25 PROCEDURE — 86901 BLOOD TYPING SEROLOGIC RH(D): CPT

## 2025-02-25 PROCEDURE — 86901 BLOOD TYPING SEROLOGIC RH(D): CPT | Performed by: INTERNAL MEDICINE

## 2025-02-25 PROCEDURE — 86900 BLOOD TYPING SEROLOGIC ABO: CPT | Performed by: INTERNAL MEDICINE

## 2025-02-25 PROCEDURE — 85730 THROMBOPLASTIN TIME PARTIAL: CPT | Performed by: INTERNAL MEDICINE

## 2025-02-25 PROCEDURE — 87176 TISSUE HOMOGENIZATION CULTR: CPT | Performed by: ORTHOPAEDIC SURGERY

## 2025-02-25 PROCEDURE — 87147 CULTURE TYPE IMMUNOLOGIC: CPT | Performed by: ORTHOPAEDIC SURGERY

## 2025-02-25 PROCEDURE — 0SUW09Z SUPPLEMENT LEFT KNEE JOINT, TIBIAL SURFACE WITH LINER, OPEN APPROACH: ICD-10-PCS | Performed by: ORTHOPAEDIC SURGERY

## 2025-02-25 PROCEDURE — 25810000003 SODIUM CHLORIDE 0.9 % SOLUTION: Performed by: INTERNAL MEDICINE

## 2025-02-25 PROCEDURE — 82248 BILIRUBIN DIRECT: CPT | Performed by: INTERNAL MEDICINE

## 2025-02-25 PROCEDURE — 25010000002 HYDROMORPHONE PER 4 MG

## 2025-02-25 PROCEDURE — 81001 URINALYSIS AUTO W/SCOPE: CPT | Performed by: INTERNAL MEDICINE

## 2025-02-25 PROCEDURE — 25010000002 ONDANSETRON PER 1 MG: Performed by: NURSE ANESTHETIST, CERTIFIED REGISTERED

## 2025-02-25 PROCEDURE — 25010000002 VANCOMYCIN 1 G RECONSTITUTED SOLUTION: Performed by: ORTHOPAEDIC SURGERY

## 2025-02-25 PROCEDURE — 25010000002 KETOROLAC TROMETHAMINE PER 15 MG: Performed by: ORTHOPAEDIC SURGERY

## 2025-02-25 PROCEDURE — 73560 X-RAY EXAM OF KNEE 1 OR 2: CPT

## 2025-02-25 PROCEDURE — 86900 BLOOD TYPING SEROLOGIC ABO: CPT

## 2025-02-25 DEVICE — IMPLANTABLE DEVICE: Type: IMPLANTABLE DEVICE | Site: KNEE | Status: FUNCTIONAL

## 2025-02-25 DEVICE — BAR TIB ASCENT/MAXIM PRI INTERLOK: Type: IMPLANTABLE DEVICE | Site: KNEE | Status: FUNCTIONAL

## 2025-02-25 DEVICE — DEV CONTRL TISS STRATAFIX SPIRAL MNCRYL UD 3/0 PLS 30CM: Type: IMPLANTABLE DEVICE | Site: KNEE | Status: FUNCTIONAL

## 2025-02-25 DEVICE — DEV WND/CLS CONTRL TISS STRATAFIX SYMM PDS PLS CTX 60CM VIL: Type: IMPLANTABLE DEVICE | Site: KNEE | Status: FUNCTIONAL

## 2025-02-25 RX ORDER — NALOXONE HCL 0.4 MG/ML
0.4 VIAL (ML) INJECTION AS NEEDED
Status: DISCONTINUED | OUTPATIENT
Start: 2025-02-25 | End: 2025-02-25 | Stop reason: HOSPADM

## 2025-02-25 RX ORDER — ONDANSETRON 2 MG/ML
INJECTION INTRAMUSCULAR; INTRAVENOUS AS NEEDED
Status: DISCONTINUED | OUTPATIENT
Start: 2025-02-25 | End: 2025-02-25 | Stop reason: SURG

## 2025-02-25 RX ORDER — TRANEXAMIC ACID 10 MG/ML
INJECTION, SOLUTION INTRAVENOUS
Status: COMPLETED
Start: 2025-02-25 | End: 2025-02-25

## 2025-02-25 RX ORDER — MAGNESIUM SULFATE HEPTAHYDRATE 500 MG/ML
INJECTION, SOLUTION INTRAMUSCULAR; INTRAVENOUS AS NEEDED
Status: DISCONTINUED | OUTPATIENT
Start: 2025-02-25 | End: 2025-02-25 | Stop reason: SURG

## 2025-02-25 RX ORDER — FENTANYL CITRATE 50 UG/ML
INJECTION, SOLUTION INTRAMUSCULAR; INTRAVENOUS AS NEEDED
Status: DISCONTINUED | OUTPATIENT
Start: 2025-02-25 | End: 2025-02-25 | Stop reason: SURG

## 2025-02-25 RX ORDER — SODIUM CHLORIDE 9 MG/ML
INJECTION, SOLUTION INTRAVENOUS CONTINUOUS PRN
Status: DISCONTINUED | OUTPATIENT
Start: 2025-02-25 | End: 2025-02-25 | Stop reason: SURG

## 2025-02-25 RX ORDER — EPHEDRINE SULFATE 5 MG/ML
5 INJECTION INTRAVENOUS ONCE AS NEEDED
Status: DISCONTINUED | OUTPATIENT
Start: 2025-02-25 | End: 2025-02-25 | Stop reason: HOSPADM

## 2025-02-25 RX ORDER — DIPHENHYDRAMINE HYDROCHLORIDE 50 MG/ML
12.5 INJECTION INTRAMUSCULAR; INTRAVENOUS ONCE AS NEEDED
Status: DISCONTINUED | OUTPATIENT
Start: 2025-02-25 | End: 2025-02-25 | Stop reason: HOSPADM

## 2025-02-25 RX ORDER — DIPHENHYDRAMINE HYDROCHLORIDE 50 MG/ML
12.5 INJECTION INTRAMUSCULAR; INTRAVENOUS
Status: DISCONTINUED | OUTPATIENT
Start: 2025-02-25 | End: 2025-02-25 | Stop reason: HOSPADM

## 2025-02-25 RX ORDER — LABETALOL HYDROCHLORIDE 5 MG/ML
5 INJECTION, SOLUTION INTRAVENOUS
Status: DISCONTINUED | OUTPATIENT
Start: 2025-02-25 | End: 2025-02-25 | Stop reason: HOSPADM

## 2025-02-25 RX ORDER — OXYCODONE HYDROCHLORIDE 5 MG/1
5 TABLET ORAL ONCE AS NEEDED
Status: DISCONTINUED | OUTPATIENT
Start: 2025-02-25 | End: 2025-02-25 | Stop reason: HOSPADM

## 2025-02-25 RX ORDER — PHENYLEPHRINE HCL IN 0.9% NACL 1 MG/10 ML
SYRINGE (ML) INTRAVENOUS AS NEEDED
Status: DISCONTINUED | OUTPATIENT
Start: 2025-02-25 | End: 2025-02-25 | Stop reason: SURG

## 2025-02-25 RX ORDER — VANCOMYCIN HYDROCHLORIDE 1 G/20ML
INJECTION, POWDER, LYOPHILIZED, FOR SOLUTION INTRAVENOUS AS NEEDED
Status: DISCONTINUED | OUTPATIENT
Start: 2025-02-25 | End: 2025-02-25 | Stop reason: HOSPADM

## 2025-02-25 RX ORDER — ONDANSETRON 2 MG/ML
4 INJECTION INTRAMUSCULAR; INTRAVENOUS ONCE AS NEEDED
Status: DISCONTINUED | OUTPATIENT
Start: 2025-02-25 | End: 2025-02-25 | Stop reason: HOSPADM

## 2025-02-25 RX ORDER — DEXAMETHASONE SODIUM PHOSPHATE 4 MG/ML
INJECTION, SOLUTION INTRA-ARTICULAR; INTRALESIONAL; INTRAMUSCULAR; INTRAVENOUS; SOFT TISSUE AS NEEDED
Status: DISCONTINUED | OUTPATIENT
Start: 2025-02-25 | End: 2025-02-25 | Stop reason: SURG

## 2025-02-25 RX ORDER — HYDROMORPHONE HYDROCHLORIDE 1 MG/ML
0.5 INJECTION, SOLUTION INTRAMUSCULAR; INTRAVENOUS; SUBCUTANEOUS EVERY 4 HOURS PRN
Status: DISPENSED | OUTPATIENT
Start: 2025-02-25 | End: 2025-03-02

## 2025-02-25 RX ORDER — LIDOCAINE HYDROCHLORIDE 20 MG/ML
INJECTION, SOLUTION EPIDURAL; INFILTRATION; INTRACAUDAL; PERINEURAL AS NEEDED
Status: DISCONTINUED | OUTPATIENT
Start: 2025-02-25 | End: 2025-02-25 | Stop reason: SURG

## 2025-02-25 RX ORDER — IPRATROPIUM BROMIDE AND ALBUTEROL SULFATE 2.5; .5 MG/3ML; MG/3ML
3 SOLUTION RESPIRATORY (INHALATION) ONCE AS NEEDED
Status: DISCONTINUED | OUTPATIENT
Start: 2025-02-25 | End: 2025-02-25 | Stop reason: HOSPADM

## 2025-02-25 RX ORDER — ASPIRIN 81 MG/1
81 TABLET ORAL 2 TIMES DAILY
Status: ACTIVE | OUTPATIENT
Start: 2025-02-25 | End: 2025-03-02

## 2025-02-25 RX ORDER — DEXMEDETOMIDINE HYDROCHLORIDE 100 UG/ML
INJECTION, SOLUTION INTRAVENOUS AS NEEDED
Status: DISCONTINUED | OUTPATIENT
Start: 2025-02-25 | End: 2025-02-25 | Stop reason: SURG

## 2025-02-25 RX ORDER — OXYCODONE HYDROCHLORIDE 5 MG/1
10 TABLET ORAL EVERY 4 HOURS PRN
Status: DISCONTINUED | OUTPATIENT
Start: 2025-02-25 | End: 2025-02-25 | Stop reason: HOSPADM

## 2025-02-25 RX ORDER — HYDRALAZINE HYDROCHLORIDE 20 MG/ML
5 INJECTION INTRAMUSCULAR; INTRAVENOUS
Status: DISCONTINUED | OUTPATIENT
Start: 2025-02-25 | End: 2025-02-25 | Stop reason: HOSPADM

## 2025-02-25 RX ORDER — TRANEXAMIC ACID 10 MG/ML
INJECTION, SOLUTION INTRAVENOUS AS NEEDED
Status: DISCONTINUED | OUTPATIENT
Start: 2025-02-25 | End: 2025-02-25 | Stop reason: SURG

## 2025-02-25 RX ORDER — PROPOFOL 10 MG/ML
VIAL (ML) INTRAVENOUS AS NEEDED
Status: DISCONTINUED | OUTPATIENT
Start: 2025-02-25 | End: 2025-02-25 | Stop reason: SURG

## 2025-02-25 RX ADMIN — SODIUM CHLORIDE: 9 INJECTION, SOLUTION INTRAVENOUS at 07:10

## 2025-02-25 RX ADMIN — HYDROCODONE BITARTRATE AND ACETAMINOPHEN 1 TABLET: 7.5; 325 TABLET ORAL at 00:03

## 2025-02-25 RX ADMIN — DICYCLOMINE HYDROCHLORIDE 10 MG: 10 CAPSULE ORAL at 16:54

## 2025-02-25 RX ADMIN — HYDROMORPHONE HYDROCHLORIDE 0.5 MG: 1 INJECTION, SOLUTION INTRAMUSCULAR; INTRAVENOUS; SUBCUTANEOUS at 23:52

## 2025-02-25 RX ADMIN — DEXMEDETOMIDINE 2 MCG: 100 INJECTION, SOLUTION INTRAVENOUS at 07:36

## 2025-02-25 RX ADMIN — Medication 10 ML: at 20:08

## 2025-02-25 RX ADMIN — HYDROMORPHONE HYDROCHLORIDE 0.5 MG: 1 INJECTION, SOLUTION INTRAMUSCULAR; INTRAVENOUS; SUBCUTANEOUS at 09:16

## 2025-02-25 RX ADMIN — DEXMEDETOMIDINE 4 MCG: 100 INJECTION, SOLUTION INTRAVENOUS at 08:17

## 2025-02-25 RX ADMIN — HYDROCODONE BITARTRATE AND ACETAMINOPHEN 1 TABLET: 7.5; 325 TABLET ORAL at 09:16

## 2025-02-25 RX ADMIN — ONDANSETRON 4 MG: 2 INJECTION, SOLUTION INTRAMUSCULAR; INTRAVENOUS at 08:09

## 2025-02-25 RX ADMIN — Medication 100 MCG: at 07:31

## 2025-02-25 RX ADMIN — HYDROCODONE BITARTRATE AND ACETAMINOPHEN 1 TABLET: 7.5; 325 TABLET ORAL at 20:05

## 2025-02-25 RX ADMIN — DICYCLOMINE HYDROCHLORIDE 10 MG: 10 CAPSULE ORAL at 20:05

## 2025-02-25 RX ADMIN — TRANEXAMIC ACID 1000 MG: 10 INJECTION, SOLUTION INTRAVENOUS at 07:29

## 2025-02-25 RX ADMIN — DEXAMETHASONE SODIUM PHOSPHATE 4 MG: 4 INJECTION, SOLUTION INTRAMUSCULAR; INTRAVENOUS at 07:23

## 2025-02-25 RX ADMIN — CEFTRIAXONE SODIUM 1000 MG: 1 INJECTION, POWDER, FOR SOLUTION INTRAMUSCULAR; INTRAVENOUS at 07:04

## 2025-02-25 RX ADMIN — HYDROMORPHONE HYDROCHLORIDE 0.5 MG: 1 INJECTION, SOLUTION INTRAMUSCULAR; INTRAVENOUS; SUBCUTANEOUS at 08:54

## 2025-02-25 RX ADMIN — HYDROMORPHONE HYDROCHLORIDE 0.5 MG: 1 INJECTION, SOLUTION INTRAMUSCULAR; INTRAVENOUS; SUBCUTANEOUS at 04:51

## 2025-02-25 RX ADMIN — FENTANYL CITRATE 50 MCG: 50 INJECTION, SOLUTION INTRAMUSCULAR; INTRAVENOUS at 07:14

## 2025-02-25 RX ADMIN — PROPOFOL 120 MG: 10 INJECTION, EMULSION INTRAVENOUS at 07:15

## 2025-02-25 RX ADMIN — SODIUM CHLORIDE 15 MMOL: 9 INJECTION, SOLUTION INTRAVENOUS at 03:14

## 2025-02-25 RX ADMIN — MAGNESIUM SULFATE HEPTAHYDRATE 1 G: 500 INJECTION, SOLUTION INTRAMUSCULAR; INTRAVENOUS at 07:40

## 2025-02-25 RX ADMIN — LIDOCAINE HYDROCHLORIDE 80 MG: 20 INJECTION, SOLUTION EPIDURAL; INFILTRATION; INTRACAUDAL; PERINEURAL at 07:15

## 2025-02-25 RX ADMIN — SODIUM CHLORIDE 100 ML/HR: 9 INJECTION, SOLUTION INTRAVENOUS at 03:43

## 2025-02-25 RX ADMIN — CEFTAZIDIME 2000 MG: 2 INJECTION, POWDER, FOR SOLUTION INTRAVENOUS at 18:06

## 2025-02-25 RX ADMIN — TRANEXAMIC ACID 1000 MG: 10 INJECTION, SOLUTION INTRAVENOUS at 07:59

## 2025-02-25 RX ADMIN — Medication 100 MCG: at 07:19

## 2025-02-25 RX ADMIN — FENTANYL CITRATE 50 MCG: 50 INJECTION, SOLUTION INTRAMUSCULAR; INTRAVENOUS at 08:32

## 2025-02-25 RX ADMIN — SODIUM CHLORIDE 100 ML/HR: 9 INJECTION, SOLUTION INTRAVENOUS at 20:06

## 2025-02-25 RX ADMIN — TAMSULOSIN HYDROCHLORIDE 0.4 MG: 0.4 CAPSULE ORAL at 16:54

## 2025-02-25 RX ADMIN — DEXMEDETOMIDINE 2 MCG: 100 INJECTION, SOLUTION INTRAVENOUS at 08:20

## 2025-02-25 RX ADMIN — DEXMEDETOMIDINE 2 MCG: 100 INJECTION, SOLUTION INTRAVENOUS at 07:29

## 2025-02-25 NOTE — CONSULTS
Orthopaedic Consult    Wilmer Bruce MD      Patient: Juan Marley    Date of Admission: 2/24/2025  6:21 AM    YOB: 1940    Medical Record Number: [id number]    Attending Physician: Vanessa King MD  Consulting Physician: Wilmer Bruce MD      Chief Complaints: Knee pain [M25.569]  Acute kidney injury [N17.9]  S/P revision of total knee, left [Z96.652]  Acute pain of left knee [M25.562]  Pyogenic arthritis of left knee joint, due to unspecified organism [M00.9]      History of Present Illness: 84 y.o. male admitted to [facility] with Knee pain [M25.569]  Acute kidney injury [N17.9]  S/P revision of total knee, left [Z96.652]  Acute pain of left knee [M25.562]  Pyogenic arthritis of left knee joint, due to unspecified organism [M00.9].   The patient is well-known to me.  He underwent an uneventful polyethylene exchange of his left knee for instability and polyethylene wear about a month ago.  He had actually been doing exceptionally well.  Last Wednesday he was also doing very well in physical therapy he was able to range 0 to 122 degrees without any pain.  On Thursday, he noted pain and swelling in his knee.  Denied any fevers.  Unfortunately the pain and swelling progressed over the weekend to the point that he presented to [facility] yesterday.  His CRP was elevated at 22.  He did not have a leukocytosis.  Sedimentation rate was normal.  An aspiration of his knee was performed and he had 95,000 neutrophils with over 95% PMNs.  There was concern for periprosthetic joint infection.  I discussed with the patient that this is a little bit of an atypical presentation for postoperative infection but he may have hematogenously seeded given the acute change course he had from Wednesday to Thursday.  States that he had had some generalized illness otherwise.  He presents today for addressing his infection with irrigation debridement and polyethylene exchange.  He notes no drainage from his  incision.    Allergies: No Known Allergies    Medications:   Home Medications:  Medications Prior to Admission   Medication Sig Dispense Refill Last Dose/Taking    aspirin 81 MG EC tablet Take 1 tablet by mouth 2 (Two) Times a Day for 30 days. 60 tablet 0     B Complex Vitamins (Vitamin-B Complex) tablet Take 1 tablet by mouth Daily. HOLD FOR SURGERY       Collagen-Vitamin C-Biotin (COLLAGEN PO) Take  by mouth. POWDER SUPPLEMENT DAILY-HOLD FOR SURGERY       dicyclomine (BENTYL) 20 MG tablet Take 1 tablet by mouth 4 (Four) Times a Day. TAKES ONE IN THE MORNING, ONE IN THE EVENING, AND 2 AT BEDTIME       gabapentin (NEURONTIN) 600 MG tablet Take 2 tablets by mouth 3 (Three) Times a Day.       HYDROcodone-acetaminophen (Norco) 7.5-325 MG per tablet Take 1 tablet by mouth Every 6 (Six) Hours As Needed for Moderate Pain. 30 tablet 0     lisinopril-hydrochlorothiazide (PRINZIDE,ZESTORETIC) 10-12.5 MG per tablet Take 1 tablet by mouth Daily.       meloxicam (MOBIC) 15 MG tablet Take 1 tablet by mouth Daily. 30 tablet 0     metoprolol succinate XL (TOPROL-XL) 25 MG 24 hr tablet Take 1 tablet by mouth Every Night.       Omega-3 Fatty Acids (fish oil) 1000 MG capsule capsule Take 1 capsule by mouth Daily With Breakfast. START HOLDING FOR SURGERY       primidone (MYSOLINE) 50 MG tablet Take 2 tablets by mouth Every Night. TAKES 2 NIGHTLY       Specialty Vitamins Products (NERVIVE NERVE RELIEF PO) Take 1 tablet by mouth Daily. HOLD FOR SURGERY       tadalafil (CIALIS) 20 MG tablet Take 1 tablet by mouth Every Evening. HOLD FOR 48 HOURS PRIOR- TAKES FOR ED       tamsulosin (FLOMAX) 0.4 MG capsule 24 hr capsule Take 1 capsule by mouth Every Evening.          Current Medications:  Scheduled Meds:[Held by provider] aspirin, 81 mg, Oral, BID  cefTRIAXone, 1,000 mg, Intravenous, Q24H  dicyclomine, 10 mg, Oral, TID  [Held by provider] gabapentin, 300 mg, Oral, TID  [Transfer Hold] sodium chloride, 10 mL, Intravenous, Q12H  tamsulosin,  0.4 mg, Oral, Q PM      Continuous Infusions:sodium chloride, 100 mL/hr, Last Rate: 100 mL/hr (25 0349)      PRN Meds:.  [Transfer Hold] acetaminophen **OR** [Transfer Hold] acetaminophen **OR** [Transfer Hold] acetaminophen    [Transfer Hold] senna-docusate sodium **AND** [Transfer Hold] polyethylene glycol **AND** [Transfer Hold] bisacodyl **AND** [Transfer Hold] bisacodyl    [Transfer Hold] Calcium Replacement - Follow Nurse / BPA Driven Protocol    hydrALAZINE    HYDROcodone-acetaminophen    [Transfer Hold] HYDROmorphone    [Transfer Hold] Magnesium Standard Dose Replacement - Follow Nurse / BPA Driven Protocol    [Transfer Hold] melatonin    [Transfer Hold] nitroglycerin    [Transfer Hold] ondansetron ODT **OR** [Transfer Hold] ondansetron    [Transfer Hold] Phosphorus Replacement - Follow Nurse / BPA Driven Protocol    Potassium Replacement - Follow Nurse / BPA Driven Protocol    [Transfer Hold] sodium chloride    [Transfer Hold] sodium chloride    Past Medical History:   Diagnosis Date    Enlarged prostate     Essential tremor     BOTH HANDS    Hypertension     Knee pain, left     Rotator cuff tear, left     Wears dentures      Past Surgical History:   Procedure Laterality Date    CATARACT EXTRACTION, BILATERAL      COLONOSCOPY      ELBOW OLECRANNON BURSA EXCISION Right     X2    ENDOSCOPY      KNEE MINI REVISION Left 2025    Procedure: LEFT  TOTAL KNEE ARTHROPLASTY REVISION POLYETHYLENE EXCHANGE;  Surgeon: Wilmer Bruce MD;  Location: [facility] OR [facility] [place];  Service: Orthopedics;  Laterality: Left;    SPINE SURGERY      SPINAL STENOSIS    SPINE SURGERY  2016    RUPTURED DISC    TONSILLECTOMY AND ADENOIDECTOMY      AGES 22S    TOTAL KNEE ARTHROPLASTY Left 2008    VASECTOMY       Social History     Occupational History    Not on file   Tobacco Use    Smoking status: Former     Current packs/day: 0.00     Types: Cigarettes     Quit date:      Years since quittin.1    Smokeless tobacco: Never    Vaping Use    Vaping status: Never Used   Substance and Sexual Activity    Alcohol use: Yes     Comment: OCCASIONAL    Drug use: Never    Sexual activity: Defer      Social History     Social History Narrative    Not on file     Family History   Problem Relation Age of Onset    Malig Hyperthermia Neg Hx          Review of Systems:   Constitutional: Negative for fatigue, fever, or weight loss  HEENT: Patient denies any headaches, vision changes, sore throat.   Pulmonary: Patient denies any cough, congestion, SOA, or wheezing  Cardiovascular: Patient denies any chest pain, palpitations, weakness,  Gastrointestinal:  Patient denies nausea, vomiting, diarrhea, constipation  Genital/Urinary: Patient denies dysuria, urinary frequency, urgency, incontinence, retention  Musculoskeletal: Positive for left knee pain and swelling. Negative for muscle cramps  Neurological: Patient denies dizziness, paresthesia, loss of sensation, seizures, syncope  Endocrine system: Patient denies tremors, cold or heat intolerance  Psychological: Patient denies thoughts/plans or harming self or other; hallucinations,  insomnia  Skin: Patient denies any bruising, rashes, lesions, or ulcers   Hematopoietic: Patient denies history of MRSA or slow wound healing,  spontaneous or excessive bleeding    Physical Exam: 84 y.o. male  Vitals:    02/24/25 2045 02/24/25 2300 02/25/25 0004 02/25/25 0421   BP: 99/55 153/75 130/53 128/61   BP Location:  Right arm Right arm Right arm   Patient Position:  Sitting Sitting Lying   Pulse: 63 69 76 73   Resp:  18 17 15   Temp:  99.4 °F (37.4 °C) 99 °F (37.2 °C)    TempSrc:    Oral   SpO2: 96%  99% 99%   Weight:       Height:                               General Appearance:  Alert, cooperative, in no acute distress    Pulmonary: Respirations non labored  MSK: Left knee skin is intact.  He does have an effusion.  There is some warmth to the knee.  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  I do recommend operative intervention for this.  Advised him the problem will likely continue to progress and worsen without treating this operatively.  Additionally, time is of the essence as the further delay there is the decreased chance of eradicating the infection.  He voices understanding.  Furthermore, we discussed pros and cons of two-stage exchange versus irrigation debridement with modular exchange.  Given that his symptoms have only been present for a few days and he had been doing very very well prior to that we can treat this as acute.  I advised him that there is a chance of failure to eradicate the infection and if that occurs he will likely need a two-stage exchange at the next point.  He voices an understanding of all the above he wishes to proceed with irrigation debridement polyethylene exchange of the left knee    Date: 2/25/2025  Wilmer Bruce MD  Orthopaedic Surgeon    [facility]  [phone]  [email or web address]

## 2025-02-25 NOTE — CASE MANAGEMENT/SOCIAL WORK
Case Management Readmission Assessment Note    Case Management Readmission Assessment (all recorded)       Readmission Interview       Row Name 02/25/25 1517             Readmission Indications    Is the patient and/or family able to complete the readmission assessment questions? Yes      Is this hospitalization related to the prior hospital diagnosis? Yes  left knee replacement issues        Row Name 02/25/25 1517             Follow-up Appointments    Do you have a PCP? Yes      Did you have an appointment with PCP after your hospitalization? No      Did you have an appointment with a Specialist? Yes  appointment scheduled for 3/3/25      When was your appointment scheduled? 03/03/25  came back to hospital prior to follow up appt      Did you go to appointment? No      Are you current with the Pulmonary Clinic? No      Are you current with the CHF Clinic? No        Row Name 02/25/25 1517             Medications    Did you have newly prescribed medications at discharge? Yes      Did you understand the reasons for your medications at discharge and how to take them? Yes      Did you understand the side effects of your medications? Yes      Are you taking all of you prescribed medications? Yes      What pharmacy was used to fill prescription(s)? [facility] [place]      Were medications picked up? Yes        Row Name 02/25/25 1517             Discharge Instructions    Did you understand your discharge instructions? Yes      Did your family/caregiver hear your instructions? Yes      Were you told to eat a special diet? No      Were you given a number of someone to call if you had questions or concerns? Yes        Row Name 02/25/25 1517             Index discharge location/services    Where did you go upon discharge? Home      Do you have supportive family or friends in the home? No  SON, JOYCE LIVES CLOSE      What services were arranged at discharge? Other (comment)  OP PT AT [facility] [place].         Row Name 02/25/25 1517             Discharge Readiness    On a scale of 1-5 (5 being well prepared), how ready were you for discharge 4        Row Name 02/25/25 1517             Palliative Care/Hospice    Are you current with Palliative Care? No      Are you current with Hospice Care? No        Row Name 02/24/25 1408             Advance Directives (For Healthcare)    Pre-existing AND/MOST/POLST Order No      Advance Directive Status Patient has advance directive, copy requested      Type of Advance Directive Health care directive/Living will      Have you reviewed your Advance Directive and is it valid for this stay? No      Literature Provided on Advance Directives No      Patient Requests Assistance on Advance Directives Other (Comment)  pt family to bring        Row Name 02/25/25 1517             Readmission Assessment Final Comments    Final Comments 1/29-1/30/25 ELECTIVE LEFT KNEE REVISION AT [facility]. HOME WITH OP PT . 2/24/25 INFECTED LEFT KNEE PROSTHESIS. SX FOR I& DWITH POLYETHYLENE EXCHANGE CULTURES PENDING  ALSO CT OF HEAD DUE TO SOME CONFUSION AFTER SURGERY.

## 2025-02-25 NOTE — ANESTHESIA PROCEDURE NOTES
Airway  Urgency: elective    Date/Time: 2/25/2025 7:16 AM  Airway not difficult    General Information and Staff    Patient location during procedure: OR  CRNA/CAA: Maki Ybarra CRNA    Indications and Patient Condition  Indications for airway management: airway protection    Preoxygenated: yes  MILS maintained throughout  Mask difficulty assessment: 1 - vent by mask    Final Airway Details  Final airway type: supraglottic airway      Successful airway: I-gel  Size 4     Number of attempts at approach: 1  Assessment: lips, teeth, and gum same as pre-op and atraumatic intubation

## 2025-02-25 NOTE — CASE MANAGEMENT/SOCIAL WORK
Discharge Planning Assessment   Carlito     Patient Name: Juan Marley  MRN: [id number]  Today's Date: 2/24/2025    Admit Date: 2/24/2025    Plan: From Home Alone; Current with [facility]   Discharge Needs Assessment       Row Name 02/24/25 1927       Living Environment    People in Home alone    Current Living Arrangements home    Potentially Unsafe Housing Conditions none    In the past 12 months has the electric, gas, oil, or water company threatened to shut off services in your home? No    Primary Care Provided by self    Provides Primary Care For no one    Family Caregiver if Needed child(jelani), adult    Family Caregiver Names Candido-son    Quality of Family Relationships unable to assess    Able to Return to Prior Arrangements yes    Living Arrangement Comments Home       Resource/Environmental Concerns    Transportation Concerns none       Transportation Needs    In the past 12 months, has lack of transportation kept you from medical appointments or from getting medications? no    In the past 12 months, has lack of transportation kept you from meetings, work, or from getting things needed for daily living? No       Food Insecurity    Within the past 12 months, you worried that your food would run out before you got the money to buy more. Never true    Within the past 12 months, the food you bought just didn't last and you didn't have money to get more. Never true       Transition Planning    Patient/Family Anticipates Transition to home;home with help/services    Patient/Family Anticipated Services at Transition none    Transportation Anticipated family or friend will provide       Discharge Needs Assessment    Equipment Currently Used at Home cane, straight;walker, rolling    Concerns to be Addressed discharge planning    Do you want help finding or keeping work or a job? I do not need or want help    Do you want help with school or training? For example, starting or completing job training or getting a  high school diploma, GED or equivalent No    Anticipated Changes Related to Illness none    Equipment Needed After Discharge none    Provided Post Acute Provider List? N/A    Provided Post Acute Provider Quality & Resource List? N/A    Offered/Gave Vendor List no                   Discharge Plan       Row Name 02/24/25 1928       Plan    Plan From Home Alone; Current with [facility]    Patient/Family in Agreement with Plan unable to assess    Plan Comments Met with patient at bedside, confirmed PCP and pharm. States he is independent with IADL's and ADL's, denies financial concerns and family/friend will provide dc transportation. He is current with [facility] in [place], DCP sent through [employer]. Discussed dc plan, declines HH or SNF at this time and anticipates return home alone. Final dc plan is pending clinical course.                                                DC Barriers: Surgery planned for 2/25; IV ABX                  Continued Care and Services - Admitted Since 2/24/2025       Therapy       Service Provider Request Status Services Address Phone Fax Patient Preferred    [facility] - [place] Pending - Request Sent -- [address] [place] [address] [place] [address] [phone] --                     Demographic Summary       Row Name 02/24/25 1926       General Information    Arrived From home    Required Notices Provided Important Message from Medicare    Referral Source emergency department    Reason for Consult discharge planning    Preferred Language English       Contact Information    Permission Granted to Share Info With       Row Name 02/24/25 1925       General Information    Admission Type inpatient                   Functional Status       Row Name 02/24/25 1926       Functional Status    Usual Activity Tolerance good    Current Activity Tolerance moderate       Physical Activity    On average, how many days per week do you engage in moderate to  strenuous exercise (like a brisk walk)? 7 days    On average, how many minutes do you engage in exercise at this level? 30 min    Number of minutes of exercise per week 210       Assessment of Health Literacy    How often do you have someone help you read hospital materials? Occasionally    How often do you have problems learning about your medical condition because of difficulty understanding written information? Occasionally    How often do you have a problem understanding what is told to you about your medical condition? Occasionally    How confident are you filling out medical forms by yourself? Extremely    Health Literacy Good       Functional Status, IADL    Medications independent    Meal Preparation independent    Housekeeping independent    Laundry independent    Shopping independent    If for any reason you need help with day-to-day activities such as bathing, preparing meals, shopping, managing finances, etc., do you get the help you need? I get all the help I need       Mental Status    General Appearance WDL WDL;appearance    General Appearance well-kept, clean       Mental Status Summary    Recent Changes in Mental Status/Cognitive Functioning no changes       Employment/    Employment Status retired              Patient Forms       Row Name 02/24/25 9243       Patient Forms    Important Message from Medicare (IMM) Delivered  IMM per Reg    Delivered to Support person    Method of delivery Telephone               Maintained distance greater than six feet and spent less than 15 minutes in the room    Johana Mitchell RN    Phone [phone]  Fax [phone]

## 2025-02-25 NOTE — PLAN OF CARE
Goal Outcome Evaluation:   Patient alert and oriented x 4. Able to make needs known. Pain treated per medication on the MAR. Patient NPO at midnight. CHG completed. Labs completed for surgery. Personal items and call light in reach. Plan of care is ongoing.

## 2025-02-25 NOTE — PROGRESS NOTES
LOS: 1 day   Patient Care Team:  Rolf Harvey MD as PCP - General (Family Medicine)  Cedric Jacobs MD as Consulting Physician (Nephrology)    Subjective     Interval History:s/p irrigation and exchange of polyethylene insert this am    Patient Complaints: Does not voice any complaints.  Confused.  Asking about bowling.     History taken from: patient    Review of Systems   Constitutional:  Positive for activity change, appetite change and fatigue. Negative for chills, diaphoresis and fever.   HENT:  Negative for facial swelling.    Eyes:  Negative for visual disturbance.   Respiratory:  Negative for cough, shortness of breath, wheezing and stridor.    Cardiovascular:  Negative for chest pain and leg swelling.   Gastrointestinal:  Negative for abdominal pain, constipation, diarrhea, nausea and vomiting.   Musculoskeletal:  Positive for arthralgias and gait problem.   Neurological:  Negative for weakness.   Psychiatric/Behavioral:  Positive for confusion.            Objective     Vital Signs  Temp:  [97.8 °F (36.6 °C)-100 °F (37.8 °C)] 98.3 °F (36.8 °C)  Heart Rate:  [52-92] 62  Resp:  [8-25] 10  BP: ()/(46-75) 108/49    Physical Exam:     General Appearance:    Alert, cooperative, in no acute distress, oriented x 1   Head:    Normocephalic, without obvious abnormality, atraumatic   Eyes:            Lids and lashes normal, conjunctivae and sclerae normal, no   icterus, no pallor, corneas clear, PERRLA   Ears:    Ears appear intact with no abnormalities noted   Throat:   No oral lesions, no thrush, oral mucosa moist   Neck:   No adenopathy, supple, trachea midline, no thyromegaly, no   carotid bruit, no JVD   Lungs:     Clear to auscultation,respirations regular, even and                  unlabored    Heart:    Regular rhythm and normal rate, normal S1 and S2, no            murmur, no gallop, no rub, no click   Chest Wall:    No abnormalities observed   Abdomen:     Normal bowel sounds, no masses,  no organomegaly, soft        Non-tender non-distended, no guarding,   Extremities:   Left leg - drain intact, dressing in place; toes are NVI   Pulses:   Pulses palpable and equal bilaterally   Skin:   No bleeding, bruising or rash   Lymph nodes:   No palpable adenopathy   Neurologic:   Cranial nerves 2 - 12 grossly intact, sensation intact, DTR       present and equal bilaterally        Results Review:    Lab Results (last 24 hours)       Procedure Component Value Units Date/Time    Hepatic Function Panel [[id number]] Collected: 02/25/25 0012    Specimen: Blood from Arm, Right Updated: 02/25/25 1100    Tissue / Bone Culture - Tissue, Knee, Left [[id number]] Collected: 02/25/25 0741    Specimen: Tissue from Knee, Left Updated: 02/25/25 1010     Gram Stain Many (4+) WBCs per low power field      Rare (1+) Gram negative bacilli    Wound Culture - Surgical Site, Knee, Left [[id number]] Collected: 02/25/25 0737    Specimen: Surgical Site from Knee, Left Updated: 02/25/25 1010     Gram Stain Many (4+) WBCs per low power field      No organisms seen    Tissue / Bone Culture - Tissue, Knee, Left [[id number]] Collected: 02/25/25 0743    Specimen: Tissue from Knee, Left Updated: 02/25/25 1010     Gram Stain Many (4+) WBCs per low power field      Rare (1+) Gram negative bacilli    Blood Culture - Blood, Arm, Left [[id number]]  (Normal) Collected: 02/24/25 0820    Specimen: Blood from Arm, Left Updated: 02/25/25 0830     Blood Culture No growth at 24 hours    Narrative:      Less than seven (7) mL's of blood was collected.  Insufficient quantity may yield false negative results.    Body Fluid Culture - Synovial Fluid, Knee, Left [[id number]] Collected: 02/24/25 1000    Specimen: Synovial Fluid from Knee, Left Updated: 02/25/25 0805     Body Fluid Culture No growth     Gram Stain Many (4+) WBCs per low power field      No organisms seen    Anaerobic Culture - Surgical Site, Knee, Left [[id number]] Collected: 02/25/25 0737     Specimen: Surgical Site from Knee, Left Updated: 02/25/25 0753    Blood Culture - Blood, Arm, Left [[id number]]  (Normal) Collected: 02/24/25 0723    Specimen: Blood from Arm, Left Updated: 02/25/25 0730     Blood Culture No growth at 24 hours    Renal Function Panel [[id number]]  (Abnormal) Collected: 02/25/25 0012    Specimen: Blood from Arm, Right Updated: 02/25/25 0050     Glucose 119 mg/dL      BUN 41 mg/dL      Creatinine 1.68 mg/dL      Sodium 136 mmol/L      Potassium 4.1 mmol/L      Chloride 104 mmol/L      CO2 22.9 mmol/L      Calcium 7.9 mg/dL      Albumin 3.2 g/dL      Phosphorus 2.0 mg/dL      Anion Gap 9.1 mmol/L      BUN/Creatinine Ratio 24.4     eGFR 40.3 mL/min/1.73     Narrative:      GFR Categories in Chronic Kidney Disease (CKD)      GFR Category          GFR (mL/min/1.73)    Interpretation  G1                     90 or greater         Normal or high (1)  G2                      60-89                Mild decrease (1)  G3a                   45-59                Mild to moderate decrease  G3b                   30-44                Moderate to severe decrease  G4                    15-29                Severe decrease  G5                    14 or less           Kidney failure          (1)In the absence of evidence of kidney disease, neither GFR category G1 or G2 fulfill the criteria for CKD.    eGFR calculation 2021 CKD-EPI creatinine equation, which does not include race as a factor    Magnesium [[id number]]  (Normal) Collected: 02/25/25 0012    Specimen: Blood from Arm, Right Updated: 02/25/25 0050     Magnesium 2.4 mg/dL     Uric Acid [[id number]]  (Normal) Collected: 02/25/25 0012    Specimen: Blood from Arm, Right Updated: 02/25/25 0050     Uric Acid 6.1 mg/dL     aPTT [[id number]]  (Normal) Collected: 02/25/25 0012    Specimen: Blood from Arm, Right Updated: 02/25/25 0035     PTT 34.3 seconds     Protime-INR [[id number]]  (Abnormal) Collected: 02/25/25 0012    Specimen: Blood from Arm, Right  Updated: 02/25/25 0035     Protime 15.5 Seconds      INR 1.23    CBC & Differential [[id number]]  (Abnormal) Collected: 02/25/25 0012    Specimen: Blood from Arm, Right Updated: 02/25/25 0022    Narrative:      The following orders were created for panel order CBC & Differential.  Procedure                               Abnormality         Status                     ---------                               -----------         ------                     CBC Auto Differential[[id number]]        Abnormal            Final result                 Please view results for these tests on the individual orders.    CBC Auto Differential [[id number]]  (Abnormal) Collected: 02/25/25 0012    Specimen: Blood from Arm, Right Updated: 02/25/25 0022     WBC 6.92 10*3/mm3      RBC 4.20 10*6/mm3      Hemoglobin 12.8 g/dL      Hematocrit 38.9 %      MCV 92.6 fL      MCH 30.5 pg      MCHC 32.9 g/dL      RDW 12.9 %      RDW-SD 43.7 fl      MPV 11.0 fL      Platelets 173 10*3/mm3      Neutrophil % 84.4 %      Lymphocyte % 5.6 %      Monocyte % 7.7 %      Eosinophil % 1.4 %      Basophil % 0.3 %      Immature Grans % 0.6 %      Neutrophils, Absolute 5.84 10*3/mm3      Lymphocytes, Absolute 0.39 10*3/mm3      Monocytes, Absolute 0.53 10*3/mm3      Eosinophils, Absolute 0.10 10*3/mm3      Basophils, Absolute 0.02 10*3/mm3      Immature Grans, Absolute 0.04 10*3/mm3      nRBC 0.0 /100 WBC     Urinalysis, Microscopic Only - Urine, Clean Catch [[id number]]  (Abnormal) Collected: 02/24/25 2040    Specimen: Urine, Clean Catch Updated: 02/24/25 2114     RBC, UA 0-2 /HPF      WBC, UA 3-5 /HPF      Bacteria, UA None Seen /HPF      Squamous Epithelial Cells, UA 13-20 /HPF      Hyaline Casts, UA 21-30 /LPF      Granular Casts, UA 13-20 /LPF      Methodology Manual Light Microscopy    Urinalysis With Microscopic If Indicated (No Culture) - Urine, Clean Catch [[id number]]  (Abnormal) Collected: 02/24/25 2040    Specimen: Urine, Clean Catch Updated: 02/24/25  2057     Color, UA Orange     Comment: Any Substance that causes an abnormal urine color can alter the accuracy of the chemical reactions.        Appearance, UA Cloudy     pH, UA <=5.0     Specific Gravity, UA 1.028     Glucose, UA Negative     Ketones, UA Trace     Bilirubin, UA Small (1+)     Comment: Confirmation testing is unavailable.  A serum bilirubin is recommended for further assessment.        Blood, UA Negative     Protein, UA 30 mg/dL (1+)     Leuk Esterase, UA Small (1+)     Nitrite, UA Positive     Urobilinogen, UA 2.0 E.U./dL    MRSA Screen, PCR (Inpatient) - Swab, Nares [[id number]]  (Normal) Collected: 02/24/25 1407    Specimen: Swab from Nares Updated: 02/24/25 1615     MRSA PCR No MRSA Detected    Narrative:      The negative predictive value of this diagnostic test is high and should only be used to consider de-escalating anti-MRSA therapy. A positive result may indicate colonization with MRSA and must be correlated clinically.    COVID-19 and FLU A/B PCR, 1 HR TAT - Swab, Nasopharynx [[id number]]  (Normal) Collected: 02/24/25 1128    Specimen: Swab from Nasopharynx Updated: 02/24/25 1154     COVID19 Not Detected     Influenza A PCR Not Detected     Influenza B PCR Not Detected    Narrative:      Fact sheet for providers: https://www.fda.gov/media/406616/download    Fact sheet for patients: https://www.fda.gov/media/629483/download    Test performed by PCR.    Body Fluid Cell Count With Differential - Synovial Fluid, Knee, Left [[id number]]  (Abnormal) Collected: 02/24/25 1000    Specimen: Synovial Fluid from Knee, Left Updated: 02/24/25 1143    Narrative:      The following orders were created for panel order Body Fluid Cell Count With Differential - Synovial Fluid, Knee, Left.  Procedure                               Abnormality         Status                     ---------                               -----------         ------                     Body fluid cell count - ...[[id number]]  Abnormal             Final result               Body fluid differential ...[[id number]]                      Final result                 Please view results for these tests on the individual orders.    Body fluid differential - Synovial Fluid, Knee, Left [[id number]] Collected: 02/24/25 1000    Specimen: Synovial Fluid from Knee, Left Updated: 02/24/25 1143     Neutrophils, Fluid % 95 %      Lymphocytes, Fluid % 2 %      Monocytes, Fluid % 3 %     Narrative:      Concentrated Smear by Cytocentrifuge Prep.             Imaging Results (Last 24 Hours)       Procedure Component Value Units Date/Time    XR Knee 1 or 2 View Left [[id number]] Collected: 02/25/25 0934     Updated: 02/25/25 0938    Narrative:      XR KNEE 1 OR 2 VW LEFT    Date of Exam: 2/25/2025 9:26 AM EST    Indication: post op    Comparison: Left knee radiograph 2/24/2025    Findings:  Total knee arthroplasty in anatomic alignment. Small less conspicuous joint effusion as well as decreasing soft tissue edema most significant along the anterior aspect of the knee joint. No evidence of loosening, hardware fracture or periprosthetic   fracture. Surgical drain noted.      Impression:      Impression:  Decreasing soft tissue swelling and joint fluid. Radiographically uncomplicated total arthroplasty in anatomic alignment.      Electronically Signed: Erick Gilmore MD    2/25/2025 9:36 AM EST    Workstation ID: [id number]    XR Chest 1 View [[id number]] Collected: 02/25/25 0701     Updated: 02/25/25 0704    Narrative:      XR CHEST 1 VW    Date of Exam: 2/25/2025 5:25 AM EST    Indication: pre op    Comparison: 9/3/2015    Findings:  Heart size normal. Mild left basilar airspace disease favor atelectasis. The right lung is clear. No pneumothorax. No significant effusion. Osseous structures appear intact.      Impression:      Impression:  Mild left basilar airspace disease favor atelectasis.          Electronically Signed: Reynold Marks MD    2/25/2025 7:02 AM EST     Workstation ID: [id number]    US Renal Bilateral [[id number]] Collected: 02/24/25 1545     Updated: 02/24/25 1549    Narrative:      US RENAL BILATERAL    Date of Exam: 2/24/2025 3:10 PM EST    Indication: Kidney failure, acute  NATALIO/CKD.    Comparison: No comparisons available.    Technique: Grayscale and color Doppler ultrasound evaluation of the kidneys and urinary bladder was performed.      Findings:  RIGHT kidney measures 11.2 x 5.4 x 5.0 cm. Renal cortical atrophy. There is no solid kidney mass. There is a 2.4 cm cyst in the lower pole the right kidney. No echogenic shadowing stone.  No hydronephrosis.    LEFT kidney measures 12.2 x 4.9 x 4.9 cm. 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 There is no solid kidney mass.  No echogenic shadowing stone.  No hydronephrosis.    Urinary bladder is unremarkable in contour. Volume of 400 cc at time of examination.        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 No acute process/evidence of obstruction.        Electronically Signed: Brett Newton MD    2/24/2025 3:47 PM EST    Workstation ID: [id number]                 I reviewed the patient's new clinical results.    Medication Review:   Scheduled Meds:[Held by provider] aspirin, 81 mg, Oral, BID  cefTRIAXone, 1,000 mg, Intravenous, Q24H  dicyclomine, 10 mg, Oral, TID  [Held by provider] gabapentin, 300 mg, Oral, TID  sodium chloride, 10 mL, Intravenous, Q12H  tamsulosin, 0.4 mg, Oral, Q PM      Continuous Infusions:sodium chloride, 100 mL/hr, Last Rate: 100 mL/hr (02/25/25 0343)      PRN Meds:.  acetaminophen **OR** acetaminophen **OR** acetaminophen    senna-docusate sodium **AND** polyethylene glycol **AND** bisacodyl **AND** bisacodyl    Calcium Replacement - Follow Nurse / BPA Driven Protocol    hydrALAZINE    HYDROcodone-acetaminophen    HYDROmorphone    Magnesium Standard Dose Replacement - Follow Nurse / BPA Driven Protocol    melatonin    nitroglycerin    ondansetron ODT **OR** ondansetron     Phosphorus Replacement - Follow Nurse / BPA Driven Protocol    Potassium Replacement - Follow Nurse / BPA Driven Protocol    sodium chloride    sodium chloride     Assessment & Plan       Knee pain    S/P revision of total knee, left    Essential tremor    Essential hypertension    Dehydration    NATALIO (acute kidney injury)    Fever    Generalized weakness    - continue ceftriaxone for joint infection; ID service consulted; may need further surgery to remove hardware  - creatinine improving with hydration  - LFT's improving; liver ultrasound unremarkable; ammonia level normal  - altered mental status - normal head ct, ammonia level.  UA was abnormal but contaminated.  Repeat with cath sample, although it may reflect a partially treated UTI at this point.  Confusion appears to be toxic/metabolic encephalopathy related to acute infection and/or anesthesia and pain meds.    SCD's for dvt prophy       Plan for disposition:SNF - anticipate prolonged antibiotic course with poor mobility    Vanessa King MD  02/25/25  11:00 EST

## 2025-02-25 NOTE — NURSING NOTE
Patient stated concerns that the orthopedic surgeon never discussed surgery for today 2/25/2025. Provider called, awaiting response.

## 2025-02-25 NOTE — ANESTHESIA POSTPROCEDURE EVALUATION
Patient: Juan Marley    Procedure Summary       Date: 02/25/25 Room / Location: [facility] OR 03 / [facility] MAIN OR    Anesthesia Start: 0710 Anesthesia Stop: 0841    Procedure: KNEE POLYETHYLENE INSERT EXCHANGE AND WASHOUT (Left: Knee) Diagnosis:     Surgeons: Wilmer Bruce MD Provider: Owen Lynch MD    Anesthesia Type: general ASA Status: 3            Anesthesia Type: general    Vitals  Vitals Value Taken Time   /49 02/25/25 0942   Temp 100 °F (37.8 °C) 02/25/25 0839   Pulse 61 02/25/25 0945   Resp 8 02/25/25 0939   SpO2 92 % 02/25/25 0945   Vitals shown include unfiled device data.        Post Anesthesia Care and Evaluation    Patient location during evaluation: PACU  Patient participation: complete - patient participated  Level of consciousness: awake  Pain scale: See nurse's notes for pain score.  Pain management: adequate    Airway patency: patent  Anesthetic complications: No anesthetic complications  PONV Status: none  Cardiovascular status: acceptable  Respiratory status: acceptable and spontaneous ventilation  Hydration status: acceptable    Comments: Patient seen and examined postoperatively; vital signs stable; SpO2 greater than or equal to 90%; cardiopulmonary status stable; nausea/vomiting adequately controlled; pain adequately controlled; no apparent anesthesia complications; patient discharged from anesthesia care when discharge criteria were met

## 2025-02-25 NOTE — ANESTHESIA PREPROCEDURE EVALUATION
Anesthesia Evaluation     NPO Solid Status: > 8 hours  NPO Liquid Status: > 8 hours           Airway   Mallampati: I  TM distance: >3 FB  Neck ROM: full  No difficulty expected  Dental - normal exam     Pulmonary - normal exam   Cardiovascular - normal exam    (+) hypertension well controlled      Neuro/Psych  GI/Hepatic/Renal/Endo    (+) renal disease-    Musculoskeletal     Abdominal  - normal exam    Bowel sounds: normal.   Substance History      OB/GYN          Other                    Anesthesia Plan    ASA 3     general     intravenous induction     Anesthetic plan, risks, benefits, and alternatives have been provided, discussed and informed consent has been obtained with: patient.  Pre-procedure education provided  Plan discussed with CRNA.    CODE STATUS:    Code Status (Patient has no pulse and is not breathing): No CPR (Do Not Attempt to Resuscitate)  Medical Interventions (Patient has pulse or is breathing): Full Support

## 2025-02-25 NOTE — PLAN OF CARE
Goal Outcome Evaluation:                                     Pt disoriented to place and time. Pt able to make needs known. Dressing clean, dry, intact. Hemovac drain intact. Safety precautions in place. Call light within reach. Plan of care ongoing.

## 2025-02-25 NOTE — PROGRESS NOTES
"NEPHROLOGY PROGRESS NOTE------[facility]    [facility]  [phone]  Cedric Jacobs MD      Patient Care Team:  Rolf Harvey MD as PCP - General (Family Medicine)  Cedric Jacobs MD as Consulting Physician (Nephrology)      Provider:  Cedric Jacobs MD  Patient Name: Juan Marley  :  1940    SUBJECTIVE:  F/U NATALIO/CKD  No chest pain or SOA    Medication:  [Held by provider] aspirin, 81 mg, Oral, BID  cefTRIAXone, 1,000 mg, Intravenous, Q24H  dicyclomine, 10 mg, Oral, TID  [Held by provider] gabapentin, 300 mg, Oral, TID  sodium chloride, 10 mL, Intravenous, Q12H  tamsulosin, 0.4 mg, Oral, Q PM      sodium chloride, 100 mL/hr, Last Rate: 100 mL/hr (25 0343)        OBJECTIVE    Vital Sign Min/Max for last 24 hours  Temp  Min: 97.8 °F (36.6 °C)  Max: 100 °F (37.8 °C)   BP  Min: 96/56  Max: 157/72   Pulse  Min: 52  Max: 92   Resp  Min: 8  Max: 25   SpO2  Min: 90 %  Max: 100 %   No data recorded   No data recorded     Flowsheet Rows      Flowsheet Row First Filed Value   Admission Height 177.8 cm (70\") Documented at 2025   Admission Weight 87 kg (191 lb 12.8 oz) Documented at 2025 0617            I/O this shift:  In: 800 [I.V.:800]  Out: 100 [Blood:100]  I/O last 3 completed shifts:  In: 1540 [P.O.:440; IV Piggyback:1100]  Out: 900 [Urine:900]    Physical Exam:  General Appearance: alert, appears stated age and cooperative  Head: normocephalic, without obvious abnormality and atraumatic  Eyes: conjunctivae and sclerae normal and no icterus  Neck: supple and no JVD  Lungs: clear to auscultation and respirations regular  Heart: regular rhythm & normal rate and normal S1, S2  Chest: Wall no abnormalities observed  Abdomen: normal bowel sounds and soft, nontender  Extremities: moves extremities well, no edema, no cyanosis and no redness  Skin: no bleeding, bruising or rash, turgor normal, color normal and no lesions " "noted  Neurologic: Alert, and oriented. No focal deficits    Labs:    WBC WBC   Date Value Ref Range Status   02/25/2025 6.92 3.40 - 10.80 10*3/mm3 Final   02/24/2025 8.51 3.40 - 10.80 10*3/mm3 Final      HGB Hemoglobin   Date Value Ref Range Status   02/25/2025 12.8 (L) 13.0 - 17.7 g/dL Final   02/24/2025 14.3 12.0 - 17.0 g/dL Final   02/24/2025 14.1 13.0 - 17.7 g/dL Final      HCT Hematocrit   Date Value Ref Range Status   02/25/2025 38.9 37.5 - 51.0 % Final   02/24/2025 42 38 - 51 % Final   02/24/2025 41.9 37.5 - 51.0 % Final      Platelets No results found for: \"LABPLAT\"   MCV MCV   Date Value Ref Range Status   02/25/2025 92.6 79.0 - 97.0 fL Final   02/24/2025 91.1 79.0 - 97.0 fL Final          Sodium Sodium   Date Value Ref Range Status   02/25/2025 136 136 - 145 mmol/L Final   02/24/2025 137 136 - 145 mmol/L Final      Potassium Potassium   Date Value Ref Range Status   02/25/2025 4.1 3.5 - 5.2 mmol/L Final   02/24/2025 3.5 3.5 - 5.2 mmol/L Final      Chloride Chloride   Date Value Ref Range Status   02/25/2025 104 98 - 107 mmol/L Final   02/24/2025 100 98 - 107 mmol/L Final      CO2 CO2   Date Value Ref Range Status   02/25/2025 22.9 22.0 - 29.0 mmol/L Final   02/24/2025 24.7 22.0 - 29.0 mmol/L Final      BUN BUN   Date Value Ref Range Status   02/25/2025 41 (H) 8 - 23 mg/dL Final   02/24/2025 37 (H) 8 - 23 mg/dL Final      Creatinine Creatinine   Date Value Ref Range Status   02/25/2025 1.68 (H) 0.76 - 1.27 mg/dL Final   02/24/2025 2.10 (H) 0.60 - 1.30 mg/dL Final   02/24/2025 1.96 (H) 0.76 - 1.27 mg/dL Final      Calcium Calcium   Date Value Ref Range Status   02/25/2025 7.9 (L) 8.6 - 10.5 mg/dL Final   02/24/2025 9.0 8.6 - 10.5 mg/dL Final      PO4 No components found for: \"PO4\"   Albumin Albumin   Date Value Ref Range Status   02/25/2025 3.2 (L) 3.5 - 5.2 g/dL Final   02/24/2025 3.7 3.5 - 5.2 g/dL Final      Magnesium Magnesium   Date Value Ref Range Status   02/25/2025 2.4 1.6 - 2.4 mg/dL Final    " "  Uric Acid No components found for: \"URIC ACID\"     Imaging Results (Last 72 Hours)       Procedure Component Value Units Date/Time    XR Knee 1 or 2 View Left [[id number]] Collected: 02/25/25 0934     Updated: 02/25/25 0938    Narrative:      XR KNEE 1 OR 2 VW LEFT    Date of Exam: 2/25/2025 9:26 AM EST    Indication: post op    Comparison: Left knee radiograph 2/24/2025    Findings:  Total knee arthroplasty in anatomic alignment. Small less conspicuous joint effusion as well as decreasing soft tissue edema most significant along the anterior aspect of the knee joint. No evidence of loosening, hardware fracture or periprosthetic   fracture. Surgical drain noted.      Impression:      Impression:  Decreasing soft tissue swelling and joint fluid. Radiographically uncomplicated total arthroplasty in anatomic alignment.      Electronically Signed: Erick Gilmore MD    2/25/2025 9:36 AM EST    Workstation ID: [id number]    XR Chest 1 View [[id number]] Collected: 02/25/25 0701     Updated: 02/25/25 0704    Narrative:      XR CHEST 1 VW    Date of Exam: 2/25/2025 5:25 AM EST    Indication: pre op    Comparison: 9/3/2015    Findings:  Heart size normal. Mild left basilar airspace disease favor atelectasis. The right lung is clear. No pneumothorax. No significant effusion. Osseous structures appear intact.      Impression:      Impression:  Mild left basilar airspace disease favor atelectasis.          Electronically Signed: Reynold Marks MD    2/25/2025 7:02 AM EST    Workstation ID: [id number]    US Renal Bilateral [[id number]] Collected: 02/24/25 1545     Updated: 02/24/25 1549    Narrative:      US RENAL BILATERAL    Date of Exam: 2/24/2025 3:10 PM EST    Indication: Kidney failure, acute  NATALIO/CKD.    Comparison: No comparisons available.    Technique: Grayscale and color Doppler ultrasound evaluation of the kidneys and urinary bladder was performed.      Findings:  RIGHT kidney measures 11.2 x 5.4 x 5.0 cm. Renal cortical " atrophy. There is no solid kidney mass. There is a 2.4 cm cyst in the lower pole the right kidney. No echogenic shadowing stone.  No hydronephrosis.    LEFT kidney measures 12.2 x 4.9 x 4.9 cm. 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 There is no solid kidney mass.  No echogenic shadowing stone.  No hydronephrosis.    Urinary bladder is unremarkable in contour. Volume of 400 cc at time of examination.        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 No acute process/evidence of obstruction.        Electronically Signed: Brett Newton MD    2/24/2025 3:47 PM EST    Workstation ID: [id number]    XR Knee 4+ View Left [[id number]] Collected: 02/24/25 0716     Updated: 02/24/25 0725    Narrative:      XR KNEE 4+ VW LEFT    Date of Exam: 2/24/2025 7:12 AM EST    Indication: pain, redness, swelling s/p TKR revision    Comparison: January 17, 2025, January 29, 2025    Findings:  Status post knee arthroplasty. Hardware components appear in stable and expected positions. No definite acute hardware complication.    There appears to be a small to moderate knee effusion. There is anterior soft tissue prominence overlying the patella and extensor tendons. There also appears to be diffuse subcutaneous edema. No definite acute osseous abnormality.      Impression:      Impression:  1.Status post knee arthroplasty.  No definite acute hardware or osseous complication.  2.Nonspecific small to moderate knee effusion, subcutaneous edema, and anterior soft tissue prominence. Joint or soft tissue infection should be considered based upon patient's reported symptoms. Correlate with clinical findings, lab values, and consider   joint aspiration.          Electronically Signed: Adrien Alvarenga    2/24/2025 7:23 AM EST    Workstation ID: [id number]            Results for orders placed during the hospital encounter of 02/24/25    XR Knee 1 or 2 View Left    Narrative  XR KNEE 1 OR 2 VW LEFT    Date of Exam: 2/25/2025 9:26 AM  EST    Indication: post op    Comparison: Left knee radiograph 2/24/2025    Findings:  Total knee arthroplasty in anatomic alignment. Small less conspicuous joint effusion as well as decreasing soft tissue edema most significant along the anterior aspect of the knee joint. No evidence of loosening, hardware fracture or periprosthetic  fracture. Surgical drain noted.    Impression  Impression:  Decreasing soft tissue swelling and joint fluid. Radiographically uncomplicated total arthroplasty in anatomic alignment.      Electronically Signed: Erick Gilmore MD  2/25/2025 9:36 AM EST  Workstation ID: [id number]      XR Chest 1 View    Narrative  XR CHEST 1 VW    Date of Exam: 2/25/2025 5:25 AM EST    Indication: pre op    Comparison: 9/3/2015    Findings:  Heart size normal. Mild left basilar airspace disease favor atelectasis. The right lung is clear. No pneumothorax. No significant effusion. Osseous structures appear intact.    Impression  Impression:  Mild left basilar airspace disease favor atelectasis.          Electronically Signed: Reynold Marks MD  2/25/2025 7:02 AM EST  Workstation ID: [id number]      XR Knee 4+ View Left    Narrative  XR KNEE 4+ VW LEFT    Date of Exam: 2/24/2025 7:12 AM EST    Indication: pain, redness, swelling s/p TKR revision    Comparison: January 17, 2025, January 29, 2025    Findings:  Status post knee arthroplasty. Hardware components appear in stable and expected positions. No definite acute hardware complication.    There appears to be a small to moderate knee effusion. There is anterior soft tissue prominence overlying the patella and extensor tendons. There also appears to be diffuse subcutaneous edema. No definite acute osseous abnormality.    Impression  Impression:  1.Status post knee arthroplasty.  No definite acute hardware or osseous complication.  2.Nonspecific small to moderate knee effusion, subcutaneous edema, and anterior soft tissue prominence. Joint or soft tissue  infection should be considered based upon patient's reported symptoms. Correlate with clinical findings, lab values, and consider  joint aspiration.          Electronically Signed: Adrien Alvarenga  2/24/2025 7:23 AM EST  Workstation ID: [id number]      Results for orders placed during the hospital encounter of 02/24/25    Duplex Venous Lower Extremity - Left CAR    Interpretation Summary    Normal left lower extremity venous duplex scan.        ASSESSMENT / PLAN      Knee pain    S/P revision of total knee, left    Essential tremor    Essential hypertension    Dehydration    NATALIO (acute kidney injury)    Fever    Generalized weakness    NATALIO-likely prerenal in setting of hypotension/diuretic and or ACE inhibitor use and with decreased p.o. intake..  Check UA, renal ultrasound to exclude any partial obstructive uropathy.  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

## 2025-02-25 NOTE — CONSULTS
Infectious Diseases Consult Note    Referring Provider: Vanessa King MD    Reason for Consultation: Left septic knee arthritis    Patient Care Team:  Rolf Harvey MD as PCP - General (Family Medicine)  Cedric Jacobs MD as Consulting Physician (Nephrology)    Chief complaint left knee pain and swelling    Subjective     History of present illness:     84-year-old male presents to the hospital on 2/24/2025 with worsening left knee pain and swelling patient thinks the pain had been worse over the last week or so and admits to several falls at home along with fever.  Patient had a arthroplasty revision and polyethylene liner exchange on 1/29/2025 due to worsening pain-patient does not believe there was any concern for infection at that time.  Patient had his original knee replacement in 2008.  Patient denies shortness of breath, cough, GI symptoms or urinary symptoms.    Review of Systems   Review of Systems   Constitutional: Negative.    HENT: Negative.     Eyes: Negative.    Respiratory: Negative.     Cardiovascular: Negative.    Gastrointestinal: Negative.    Endocrine: Negative.    Genitourinary: Negative.    Musculoskeletal:  Positive for joint swelling.   Skin: Negative.    Neurological: Negative.    Psychiatric/Behavioral: Negative.     All other systems reviewed and are negative.      Medications  Medications Prior to Admission   Medication Sig Dispense Refill Last Dose/Taking    aspirin 81 MG EC tablet Take 1 tablet by mouth 2 (Two) Times a Day for 30 days. 60 tablet 0     B Complex Vitamins (Vitamin-B Complex) tablet Take 1 tablet by mouth Daily. HOLD FOR SURGERY       Collagen-Vitamin C-Biotin (COLLAGEN PO) Take  by mouth. POWDER SUPPLEMENT DAILY-HOLD FOR SURGERY       dicyclomine (BENTYL) 20 MG tablet Take 1 tablet by mouth 4 (Four) Times a Day. TAKES ONE IN THE MORNING, ONE IN THE EVENING, AND 2 AT BEDTIME       gabapentin (NEURONTIN) 600 MG tablet Take 2 tablets by mouth 3 (Three) Times a Day.        HYDROcodone-acetaminophen (Norco) 7.5-325 MG per tablet Take 1 tablet by mouth Every 6 (Six) Hours As Needed for Moderate Pain. 30 tablet 0     lisinopril-hydrochlorothiazide (PRINZIDE,ZESTORETIC) 10-12.5 MG per tablet Take 1 tablet by mouth Daily.       meloxicam (MOBIC) 15 MG tablet Take 1 tablet by mouth Daily. 30 tablet 0     metoprolol succinate XL (TOPROL-XL) 25 MG 24 hr tablet Take 1 tablet by mouth Every Night.       Omega-3 Fatty Acids (fish oil) 1000 MG capsule capsule Take 1 capsule by mouth Daily With Breakfast. START HOLDING FOR SURGERY       primidone (MYSOLINE) 50 MG tablet Take 2 tablets by mouth Every Night. TAKES 2 NIGHTLY       Specialty Vitamins Products (NERVIVE NERVE RELIEF PO) Take 1 tablet by mouth Daily. HOLD FOR SURGERY       tadalafil (CIALIS) 20 MG tablet Take 1 tablet by mouth Every Evening. HOLD FOR 48 HOURS PRIOR- TAKES FOR ED       tamsulosin (FLOMAX) 0.4 MG capsule 24 hr capsule Take 1 capsule by mouth Every Evening.          History  Past Medical History:   Diagnosis Date    Enlarged prostate     Essential tremor     BOTH HANDS    Hypertension     Knee pain, left     Rotator cuff tear, left     Wears dentures      Past Surgical History:   Procedure Laterality Date    CATARACT EXTRACTION, BILATERAL      COLONOSCOPY      ELBOW OLECRANNON BURSA EXCISION Right     X2    ENDOSCOPY      KNEE MINI REVISION Left 1/29/2025    Procedure: LEFT  TOTAL KNEE ARTHROPLASTY REVISION POLYETHYLENE EXCHANGE;  Surgeon: Wilmer Bruce MD;  Location: [facility] OR [facility] [place];  Service: Orthopedics;  Laterality: Left;    SPINE SURGERY  2015    SPINAL STENOSIS    SPINE SURGERY  2016    RUPTURED DISC    TONSILLECTOMY AND ADENOIDECTOMY      AGES 20S    TOTAL KNEE ARTHROPLASTY Left 2008    VASECTOMY         Family History  Family History   Problem Relation Age of Onset    Malig Hyperthermia Neg Hx        Social History   reports that he quit smoking about 68 years ago. His smoking use included cigarettes. He has never  used smokeless tobacco. He reports current alcohol use. He reports that he does not use drugs.    Allergies  Patient has no known allergies.    Objective     Vital Signs   Vital Signs (last 24 hours)         02/24 0700  02/25 0659 02/25 0700  02/25 1710   Most Recent      Temp (°F) 97.8 -  99.4    97.8 -  100     97.8 (36.6) 02/25 1120    Heart Rate 52 -  76    56 -  92     56 02/25 1120    Resp 15 -  18    8 -  25     14 02/25 1120    BP 96/56 -  153/75    98/47 -  157/72     98/47 02/25 1120    SpO2 (%) 92 -  99    90 -  100     93 02/25 1120    Flow (L/min) (Oxygen Therapy)   2 -  8     2 02/25 0952            Physical Exam:  Physical Exam  Vitals and nursing note reviewed.   Constitutional:       General: He is not in acute distress.     Appearance: Normal appearance. He is well-developed and normal weight. He is not diaphoretic.   HENT:      Head: Normocephalic and atraumatic.   Eyes:      Conjunctiva/sclera: Conjunctivae normal.      Pupils: Pupils are equal, round, and reactive to light.   Cardiovascular:      Rate and Rhythm: Normal rate and regular rhythm.      Heart sounds: Normal heart sounds, S1 normal and S2 normal.   Pulmonary:      Effort: Pulmonary effort is normal. No respiratory distress.      Breath sounds: Normal breath sounds. No stridor. No wheezing or rales.   Abdominal:      General: Bowel sounds are normal. There is no distension.      Palpations: Abdomen is soft. There is no mass.      Tenderness: There is no abdominal tenderness. There is no guarding.   Musculoskeletal:         General: No deformity.      Cervical back: Neck supple.      Comments: Left knee with dressing in place and Hemovac with bloody drainage   Skin:     General: Skin is warm and dry.      Coloration: Skin is not pale.      Findings: No erythema or rash.   Neurological:      Mental Status: He is alert and oriented to person, place, and time.      Cranial Nerves: No cranial nerve deficit.   Psychiatric:         Mood and  Affect: Mood normal.         Microbiology  Microbiology Results (last 10 days)       Procedure Component Value - Date/Time    Tissue / Bone Culture - Tissue, Knee, Left [[id number]] Collected: 02/25/25 0743    Lab Status: Preliminary result Specimen: Tissue from Knee, Left Updated: 02/25/25 1010     Gram Stain Many (4+) WBCs per low power field      Rare (1+) Gram negative bacilli    Tissue / Bone Culture - Tissue, Knee, Left [[id number]] Collected: 02/25/25 0741    Lab Status: Preliminary result Specimen: Tissue from Knee, Left Updated: 02/25/25 1010     Gram Stain Many (4+) WBCs per low power field      Rare (1+) Gram negative bacilli    Wound Culture - Surgical Site, Knee, Left [[id number]] Collected: 02/25/25 0737    Lab Status: Preliminary result Specimen: Surgical Site from Knee, Left Updated: 02/25/25 1010     Gram Stain Many (4+) WBCs per low power field      No organisms seen    MRSA Screen, PCR (Inpatient) - Swab, Nares [[id number]]  (Normal) Collected: 02/24/25 1407    Lab Status: Final result Specimen: Swab from Nares Updated: 02/24/25 1615     MRSA PCR No MRSA Detected    Narrative:      The negative predictive value of this diagnostic test is high and should only be used to consider de-escalating anti-MRSA therapy. A positive result may indicate colonization with MRSA and must be correlated clinically.    COVID-19 and FLU A/B PCR, 1 HR TAT - Swab, Nasopharynx [[id number]]  (Normal) Collected: 02/24/25 1128    Lab Status: Final result Specimen: Swab from Nasopharynx Updated: 02/24/25 1154     COVID19 Not Detected     Influenza A PCR Not Detected     Influenza B PCR Not Detected    Narrative:      Fact sheet for providers: https://www.fda.gov/media/572050/download    Fact sheet for patients: https://www.fda.gov/media/300100/download    Test performed by PCR.    Body Fluid Culture - Synovial Fluid, Knee, Left [[id number]] Collected: 02/24/25 1000    Lab Status: Preliminary result Specimen: Synovial Fluid from  Knee, Left Updated: 02/25/25 0805     Body Fluid Culture No growth     Gram Stain Many (4+) WBCs per low power field      No organisms seen    Blood Culture - Blood, Arm, Left [[id number]]  (Normal) Collected: 02/24/25 0820    Lab Status: Preliminary result Specimen: Blood from Arm, Left Updated: 02/25/25 0830     Blood Culture No growth at 24 hours    Narrative:      Less than seven (7) mL's of blood was collected.  Insufficient quantity may yield false negative results.    Blood Culture - Blood, Arm, Left [[id number]]  (Normal) Collected: 02/24/25 0723    Lab Status: Preliminary result Specimen: Blood from Arm, Left Updated: 02/25/25 0730     Blood Culture No growth at 24 hours            Laboratory  Results from last 7 days   Lab Units 02/25/25  0012   WBC 10*3/mm3 6.92   HEMOGLOBIN g/dL 12.8*   HEMATOCRIT % 38.9   PLATELETS 10*3/mm3 173     Results from last 7 days   Lab Units 02/25/25  0012   SODIUM mmol/L 136   POTASSIUM mmol/L 4.1   CHLORIDE mmol/L 104   CO2 mmol/L 22.9   BUN mg/dL 41*   CREATININE mg/dL 1.68*   GLUCOSE mg/dL 119*   CALCIUM mg/dL 7.9*     Results from last 7 days   Lab Units 02/25/25  0012   SODIUM mmol/L 136   POTASSIUM mmol/L 4.1   CHLORIDE mmol/L 104   CO2 mmol/L 22.9   BUN mg/dL 41*   CREATININE mg/dL 1.68*   GLUCOSE mg/dL 119*   CALCIUM mg/dL 7.9*         Results from last 7 days   Lab Units 02/24/25  0723   SED RATE mm/hr 20           Radiology  Imaging Results (Last 72 Hours)       Procedure Component Value Units Date/Time    CT Head Without Contrast [[id number]] Collected: 02/25/25 1233     Updated: 02/25/25 1237    Narrative:      CT HEAD WO CONTRAST    Date of Exam: 2/25/2025 12:22 PM EST    Indication: confusion.    Comparison: None available.    Technique: Axial CT images were obtained of the head without contrast administration.  Coronal reconstructions were performed.  Automated exposure control and iterative reconstruction methods were used.      Findings:  Negative for large  territory infarct, acute intracranial hemorrhage, large mass lesion, hydrocephalus or midline shift. There is mild global parenchymal volume loss for age. Minimal periventricular hypodensity most commonly reflecting sequelae of chronic   microvascular ischemic change. Vascular calcifications. No large extra-axial fluid collection. Leftward deviated septum and degenerative spurring. No obstructive sinus disease or layering fluid. No large mastoid effusion. Calvarium grossly unremarkable.      Impression:      Impression:  No acute intracranial findings by CT.    Mild global parenchymal volume loss and presumed sequela of chronic microvascular ischemic change.      Electronically Signed: Erick Gilmore MD    2/25/2025 12:35 PM EST    Workstation ID: [id number]    US Liver [[id number]] Collected: 02/25/25 1229     Updated: 02/25/25 1233    Narrative:      US LIVER    Date of Exam: 2/25/2025 12:11 PM EST    Indication: abnormal lft's, confusion.    Comparison: No comparisons available.    Technique: Grayscale and color Doppler ultrasound evaluation of the right upper quadrant was performed.      Findings:  Liver has homogenous echogenicity and normal echotexture.  No focal hepatic lesion. Hepatopetal flow of the main portal vein. No visualized ascites.    Numerous echogenic shadowing gallstones. Gallbladder is distended without significant wall thickening or pericholecystic fluid.    Mild intrahepatic duct dilation. Dilated extrahepatic biliary tree, common bile duct measures up to 1.3 cm abnormal for age.    The visualized portions of the head and body of the pancreas are grossly unremarkable. The pancreatic tail is not seen due to bowel gas.    Homogeneous cortical echotexture of the right kidney. No hydronephrosis, shadowing echogenicities or solid masses.          Impression:      1. Cholelithiasis. Distended gallbladder without wall thickening or pericholecystic fluid. Correlate for clinical signs of acute  cholecystitis.  2. Dilated biliary tree for age. If there is concern for choledocholithiasis or other downstream obstruction recommend MRCP.      Electronically Signed: Erick Gilmore MD    2/25/2025 12:31 PM EST    Workstation ID: [id number]    XR Knee 1 or 2 View Left [[id number]] Collected: 02/25/25 0934     Updated: 02/25/25 0938    Narrative:      XR KNEE 1 OR 2 VW LEFT    Date of Exam: 2/25/2025 9:26 AM EST    Indication: post op    Comparison: Left knee radiograph 2/24/2025    Findings:  Total knee arthroplasty in anatomic alignment. Small less conspicuous joint effusion as well as decreasing soft tissue edema most significant along the anterior aspect of the knee joint. No evidence of loosening, hardware fracture or periprosthetic   fracture. Surgical drain noted.      Impression:      Impression:  Decreasing soft tissue swelling and joint fluid. Radiographically uncomplicated total arthroplasty in anatomic alignment.      Electronically Signed: Erick Gilmore MD    2/25/2025 9:36 AM EST    Workstation ID: [id number]    XR Chest 1 View [[id number]] Collected: 02/25/25 0701     Updated: 02/25/25 0704    Narrative:      XR CHEST 1 VW    Date of Exam: 2/25/2025 5:25 AM EST    Indication: pre op    Comparison: 9/3/2015    Findings:  Heart size normal. Mild left basilar airspace disease favor atelectasis. The right lung is clear. No pneumothorax. No significant effusion. Osseous structures appear intact.      Impression:      Impression:  Mild left basilar airspace disease favor atelectasis.          Electronically Signed: Reynold Marks MD    2/25/2025 7:02 AM EST    Workstation ID: [id number]    US Renal Bilateral [[id number]] Collected: 02/24/25 1545     Updated: 02/24/25 1549    Narrative:      US RENAL BILATERAL    Date of Exam: 2/24/2025 3:10 PM EST    Indication: Kidney failure, acute  NATALIO/CKD.    Comparison: No comparisons available.    Technique: Grayscale and color Doppler ultrasound evaluation of the kidneys  and urinary bladder was performed.      Findings:  RIGHT kidney measures 11.2 x 5.4 x 5.0 cm. Renal cortical atrophy. There is no solid kidney mass. There is a 2.4 cm cyst in the lower pole the right kidney. No echogenic shadowing stone.  No hydronephrosis.    LEFT kidney measures 12.2 x 4.9 x 4.9 cm. 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 There is no solid kidney mass.  No echogenic shadowing stone.  No hydronephrosis.    Urinary bladder is unremarkable in contour. Volume of 400 cc at time of examination.        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 No acute process/evidence of obstruction.        Electronically Signed: Brett Newton MD    2/24/2025 3:47 PM EST    Workstation ID: [id number]    XR Knee 4+ View Left [[id number]] Collected: 02/24/25 0716     Updated: 02/24/25 0725    Narrative:      XR KNEE 4+ VW LEFT    Date of Exam: 2/24/2025 7:12 AM EST    Indication: pain, redness, swelling s/p TKR revision    Comparison: January 17, 2025, January 29, 2025    Findings:  Status post knee arthroplasty. Hardware components appear in stable and expected positions. No definite acute hardware complication.    There appears to be a small to moderate knee effusion. There is anterior soft tissue prominence overlying the patella and extensor tendons. There also appears to be diffuse subcutaneous edema. No definite acute osseous abnormality.      Impression:      Impression:  1.Status post knee arthroplasty.  No definite acute hardware or osseous complication.  2.Nonspecific small to moderate knee effusion, subcutaneous edema, and anterior soft tissue prominence. Joint or soft tissue infection should be considered based upon patient's reported symptoms. Correlate with clinical findings, lab values, and consider   joint aspiration.          Electronically Signed: Adrien Alvarenga    2/24/2025 7:23 AM EST    Workstation ID: [id number]            Cardiology      Results Review:  I have reviewed all clinical data,  test, lab, and imaging results.       Schedule Meds  [Held by provider] aspirin, 81 mg, Oral, BID  [START ON 2/26/2025] cefTRIAXone, 2,000 mg, Intravenous, Q24H  dicyclomine, 10 mg, Oral, TID  [Held by provider] gabapentin, 300 mg, Oral, TID  sodium chloride, 10 mL, Intravenous, Q12H  tamsulosin, 0.4 mg, Oral, Q PM        Infusion Meds  sodium chloride, 100 mL/hr, Last Rate: 100 mL/hr (02/25/25 0343)        PRN Meds    acetaminophen **OR** acetaminophen **OR** acetaminophen    senna-docusate sodium **AND** polyethylene glycol **AND** bisacodyl **AND** bisacodyl    Calcium Replacement - Follow Nurse / BPA Driven Protocol    hydrALAZINE    HYDROcodone-acetaminophen    HYDROmorphone    Magnesium Standard Dose Replacement - Follow Nurse / BPA Driven Protocol    melatonin    nitroglycerin    ondansetron ODT **OR** ondansetron    Phosphorus Replacement - Follow Nurse / BPA Driven Protocol    Potassium Replacement - Follow Nurse / BPA Driven Protocol    sodium chloride    sodium chloride      Assessment & Plan       Assessment    Left total knee infection.  Patient status post aspiration in the ED that showed over 93,000 total nucleated cells and purulence was seen both at the prepatellar bursa and deeper into the knee during an incision and drainage and liner exchange on 2/25/2025.  Waiting on interoperative cultures and Gram stain was positive for gram-negative bacilli    History of total left knee replacement in 2008.  Status post left knee arthroplasty revision and liner exchange on 1/29/2025 due to pain    Plan    Discontinue IV Rocephin  Start IV ceftazidime 2 g every 12 hours  Waiting on interoperative cultures  Patient will need 6 weeks of IV antibiotics  Continue supportive care  Darcy Payton, APRN  02/25/25  17:10 EST    Note is dictated utilizing voice recognition software/Dragon

## 2025-02-25 NOTE — OP NOTE
Left knee arthrotomy irrigation debridement polyethylene exchange    Juan Marley  2/25/2025    Pre-op Diagnosis: Left knee periprosthetic joint.   Post-op Diagnosis: Same  Procedure:    Revision left knee single  component polyethylene insert with locking bar CPT code 45019    Arthrotomy of left knee with extensive synovectomy anterior including medial and lateral gutters for infection CPT code 89166  Surgeon:  Wilmer Bruce MD  Assistant: Destiny Funk PA-C  The services of a first assist were necessary for performing the procedure safely and expeditiously.  The first assist was present for the entire duration of the case and helped with positioning, retraction and closure of the incision.    Anesthesia: General, Anesthesiologist: Owen Lynch MD  CRNA: Maki Ybarra CRNA  Staff: Circulator: Shruthi Dougherty RN; Rubi Solorzano RN  Scrub Person: Dick Pratt  Vendor Representative: Chirag Simental  Assistant: Destiny Funk PA-C CFA  Estimated Blood Loss: 100ml  Specimens:   Order Name Source Comment Collection Info Order Time   ANAEROBIC CULTURE Knee, Left  Collected By: Wilmer Bruce MD 2/25/2025  7:38 AM     Release to patient   Routine Release        WOUND CULTURE Knee, Left  Collected By: Wilmer Bruce MD 2/25/2025  7:38 AM     Release to patient   Routine Release        TISSUE / BONE CULTURE Knee, Left  Collected By: Wilmer Bruce MD 2/25/2025  7:43 AM     Release to patient   Routine Release        TISSUE / BONE CULTURE Knee, Left  Collected By: Wilmer Bruce MD 2/25/2025  7:43 AM     Release to patient   Routine Release          Drains: none  Complications: None    Components Utilized:    Implant Name Type Inv. Item Serial No.  Lot No. LRB No. Used Action   DEV CONTRL TISS STRATAFIX SPIRAL MNCRYL UD 3/0 PLS 30CM - [id number] Implant DEV CONTRL TISS STRATAFIX SPIRAL MNCRYL UD 3/0 PLS 30CM  [employer]  DIV OF J AND J [id number] Left 1 Implanted   DEV WND/CLS  CONTRL TISS STRATAFIX SYMM PDS PLS CTX 60CM JULI - [id number] Implant DEV WND/CLS CONTRL TISS STRATAFIX SYMM PDS PLS CTX 60CM JULI  [employer]  DIV OF J AND J [id number] Left 1 Implanted   BEAR TIB/KN VANGUARD LIP VE CR 79/[id number] - [id number] Implant BEAR TIB/KN VANGUARD LIP VE CR [id number]  [employer] [id number] Left 1 Implanted   BAR TIB ASCENT/MAXIM AMADEO INTERLOK - [id number] Implant BAR TIB ASCENT/MAXIM AMADEO INTERLOK  [employer] [id number] Left 1 Implanted       Indication for Procedure:  This patient is a 84 y.o. male who has a history of a left knee revision polyethylene exchange about 1 month ago.  He had been doing very well however last week and late in the week he developed pain and swelling in the knee.  He had felt generalized malaise as well.  He also had 2 falls.  He presented to the emergency department where he was found to have NATALIO as well as hypokalemia.  He had an elevated CRP.  Sedimentation rate was normal he had no leukocytosis.  We did perform an aspiration of his knee which was consistent with periprosthetic joint infection with over 90,000 PMNs 96 5%.  He had no organisms on Gram stain but 4+ WBCs.  For these reasons I recommended proceeding to the operating room for irrigation debridement and modular exchange.  We discussed that the infection may or may not be eradicated.  If we are unable to fully eradicate the infection he would likely need to return to the operating room for a two-stage exchange.  Surgical options and non-surgical options were discussed in detail and to the patient's satisfaction.  Surgical intervention was recommended based on the patient's injury and functional status.      The risks and benefits of surgery were discussed with patient and informed consent was obtained.  Risks include but are not limited to, infection, bleeding, nerve injury, blood clots, risks associated with anesthesia, need for further surgery, persistent pain, and possibly death.    Protocols for  intravenous antibiotics and venous thrombosis were followed for this patient.  IV antibiotics were infused prior to surgery and will be discontinued within 24 hours of completion of the surgical procedure.       DESCRIPTION OF PROCEDURE:     The patient was seen in Preoperative Holding Area where their surgical site was marked. Preoperative antibiotics were received. H&P and consent updated. They were taken to the Operating Room and provided general anesthesia on the Operating Room table.  A well-padded nonsterile tourniquet is placed on the left proximal thigh.  The wound was examined it was intact there was no drainage however he did have some warmth to the knee and a large effusion.  The extremities prepped and draped in a circumferential two-stage fashion with alcohol followed by ChloraPrep.  Gloves were changed.  Formal surgical timeout confirmed the correct patient, procedure preformed laterality as well as she received tranexamic acid and was redosed ceftriaxone prior to incision.  He had been on scheduled antibiotics prior to surgery unfortunately.  All members of the team were in agreement.  Next the extremity was exsanguinated and tourniquet was applied to 250 mmHg.  I marked out the prior incision and sharply incised the skin and subcutaneous tissues using a 10 blade scalpel.  There was purulence noted in the prepatellar bursa.  This was debrided using a Hyatt and rongeur.  It was noted that there was a small rent in the arthrotomy where it looked like infection had eroded through oriented perhaps torn with one of his falls.  The extensor mechanism however was grossly intact.  I then went ahead and made a medial parapatellar arthrotomy.  There was gross purulence within the knee as well as some synovitis.  Quite a bit of time was spent debriding the medial and lateral gutters using a Hyatt elevator as well as a rongeur.  I debrided all fibrous friable tissue to healthy appearing synovial and capsular  tissue.  I then remove the polyethylene insert including the locking bar.  The notch was cleaned also protecting the posterior cruciate ligament.  I then irrigated the knee using 3 L of sterile saline with a Betadine solution I scrubbed the implants using a dry laparotomy sponge.  I then irrigated the knee using acetic acid bactisure solution.  This was allowed to sit for 3 minutes.  Gloves were changed.  A new polyethylene insert was engaged and the locking bar was engaged.  The knee was ranged it was very stable.  The tourniquet was deflated hemostasis was confirmed.  Final debridement was performed.  The knee was irrigated once again with additional sterile saline followed by additional Betadine solution and a surgeon for bottle.  1 g of vancomycin powder was placed within the knee.  A 10 Estonian Hemovac drain was placed in the knee exiting superior laterally.    Second gram of tranexamic acid was administered.  The knee was then placed in a 90 degree flexed position.  The arthrotomy was then closed in a watertight fashion using #1 strata fix barbed suture.  The skin was then closed in a layered fashion and staples were utilized to close the skin edges.  Sterile silver impregnated occlusive dressing was applied    Postoperative Plan:  Patient be weightbearing as tolerated to the left lower extremity  Aspirin 81 mg twice daily for DVT prophylaxis in conjunction with SCDs bilaterally    He is on scheduled antibiotics and infectious disease consult will be placed.  Suspect 6 weeks of IV antibiotic therapy and likely a 3-month consolidative course of oral antibiotics thereafter given his retention of implants.  Wilmer Bruce MD

## 2025-02-26 LAB
ALBUMIN SERPL-MCNC: 3.1 G/DL (ref 3.5–5.2)
ANION GAP SERPL CALCULATED.3IONS-SCNC: 9.5 MMOL/L (ref 5–15)
BASOPHILS # BLD AUTO: 0.02 10*3/MM3 (ref 0–0.2)
BASOPHILS # BLD AUTO: 0.02 10*3/MM3 (ref 0–0.2)
BASOPHILS NFR BLD AUTO: 0.2 % (ref 0–1.5)
BASOPHILS NFR BLD AUTO: 0.2 % (ref 0–1.5)
BUN SERPL-MCNC: 32 MG/DL (ref 8–23)
BUN/CREAT SERPL: 26.4 (ref 7–25)
CALCIUM SPEC-SCNC: 8 MG/DL (ref 8.6–10.5)
CHLORIDE SERPL-SCNC: 106 MMOL/L (ref 98–107)
CO2 SERPL-SCNC: 22.5 MMOL/L (ref 22–29)
CREAT SERPL-MCNC: 1.21 MG/DL (ref 0.76–1.27)
DEPRECATED RDW RBC AUTO: 42.2 FL (ref 37–54)
DEPRECATED RDW RBC AUTO: 43.6 FL (ref 37–54)
EGFRCR SERPLBLD CKD-EPI 2021: 59.8 ML/MIN/1.73
EOSINOPHIL # BLD AUTO: 0.22 10*3/MM3 (ref 0–0.4)
EOSINOPHIL # BLD AUTO: 0.24 10*3/MM3 (ref 0–0.4)
EOSINOPHIL NFR BLD AUTO: 2.6 % (ref 0.3–6.2)
EOSINOPHIL NFR BLD AUTO: 2.6 % (ref 0.3–6.2)
ERYTHROCYTE [DISTWIDTH] IN BLOOD BY AUTOMATED COUNT: 12.5 % (ref 12.3–15.4)
ERYTHROCYTE [DISTWIDTH] IN BLOOD BY AUTOMATED COUNT: 12.6 % (ref 12.3–15.4)
GLUCOSE FLD-MCNC: <2 MG/DL
GLUCOSE SERPL-MCNC: 117 MG/DL (ref 65–99)
HCT VFR BLD AUTO: 32.6 % (ref 37.5–51)
HCT VFR BLD AUTO: 35.8 % (ref 37.5–51)
HGB BLD-MCNC: 10.8 G/DL (ref 13–17.7)
HGB BLD-MCNC: 11.7 G/DL (ref 13–17.7)
IMM GRANULOCYTES # BLD AUTO: 0.04 10*3/MM3 (ref 0–0.05)
IMM GRANULOCYTES # BLD AUTO: 0.06 10*3/MM3 (ref 0–0.05)
IMM GRANULOCYTES NFR BLD AUTO: 0.5 % (ref 0–0.5)
IMM GRANULOCYTES NFR BLD AUTO: 0.6 % (ref 0–0.5)
LYMPHOCYTES # BLD AUTO: 0.66 10*3/MM3 (ref 0.7–3.1)
LYMPHOCYTES # BLD AUTO: 0.66 10*3/MM3 (ref 0.7–3.1)
LYMPHOCYTES NFR BLD AUTO: 7.1 % (ref 19.6–45.3)
LYMPHOCYTES NFR BLD AUTO: 7.9 % (ref 19.6–45.3)
MCH RBC QN AUTO: 30.3 PG (ref 26.6–33)
MCH RBC QN AUTO: 30.6 PG (ref 26.6–33)
MCHC RBC AUTO-ENTMCNC: 32.7 G/DL (ref 31.5–35.7)
MCHC RBC AUTO-ENTMCNC: 33.1 G/DL (ref 31.5–35.7)
MCV RBC AUTO: 91.6 FL (ref 79–97)
MCV RBC AUTO: 93.7 FL (ref 79–97)
MONOCYTES # BLD AUTO: 0.67 10*3/MM3 (ref 0.1–0.9)
MONOCYTES # BLD AUTO: 0.7 10*3/MM3 (ref 0.1–0.9)
MONOCYTES NFR BLD AUTO: 7.6 % (ref 5–12)
MONOCYTES NFR BLD AUTO: 8 % (ref 5–12)
NEUTROPHILS NFR BLD AUTO: 6.78 10*3/MM3 (ref 1.7–7)
NEUTROPHILS NFR BLD AUTO: 7.56 10*3/MM3 (ref 1.7–7)
NEUTROPHILS NFR BLD AUTO: 80.8 % (ref 42.7–76)
NEUTROPHILS NFR BLD AUTO: 81.9 % (ref 42.7–76)
NRBC BLD AUTO-RTO: 0 /100 WBC (ref 0–0.2)
NRBC BLD AUTO-RTO: 0 /100 WBC (ref 0–0.2)
PHOSPHATE SERPL-MCNC: 1.7 MG/DL (ref 2.5–4.5)
PHOSPHATE SERPL-MCNC: 1.9 MG/DL (ref 2.5–4.5)
PLATELET # BLD AUTO: 162 10*3/MM3 (ref 140–450)
PLATELET # BLD AUTO: 208 10*3/MM3 (ref 140–450)
PMV BLD AUTO: 10.8 FL (ref 6–12)
PMV BLD AUTO: 11.3 FL (ref 6–12)
POTASSIUM SERPL-SCNC: 4 MMOL/L (ref 3.5–5.2)
PROT FLD-MCNC: 4.4 G/DL
RBC # BLD AUTO: 3.56 10*6/MM3 (ref 4.14–5.8)
RBC # BLD AUTO: 3.82 10*6/MM3 (ref 4.14–5.8)
SODIUM SERPL-SCNC: 138 MMOL/L (ref 136–145)
WBC NRBC COR # BLD AUTO: 8.39 10*3/MM3 (ref 3.4–10.8)
WBC NRBC COR # BLD AUTO: 9.24 10*3/MM3 (ref 3.4–10.8)

## 2025-02-26 PROCEDURE — 85025 COMPLETE CBC W/AUTO DIFF WBC: CPT | Performed by: ORTHOPAEDIC SURGERY

## 2025-02-26 PROCEDURE — 97162 PT EVAL MOD COMPLEX 30 MIN: CPT

## 2025-02-26 PROCEDURE — 25010000002 CEFTAZIDIME 2 G RECONSTITUTED SOLUTION 1 EACH VIAL: Performed by: NURSE PRACTITIONER

## 2025-02-26 PROCEDURE — 80069 RENAL FUNCTION PANEL: CPT | Performed by: ORTHOPAEDIC SURGERY

## 2025-02-26 PROCEDURE — 82306 VITAMIN D 25 HYDROXY: CPT | Performed by: INTERNAL MEDICINE

## 2025-02-26 PROCEDURE — 25810000003 SODIUM CHLORIDE 0.9 % SOLUTION: Performed by: INTERNAL MEDICINE

## 2025-02-26 PROCEDURE — 84100 ASSAY OF PHOSPHORUS: CPT | Performed by: INTERNAL MEDICINE

## 2025-02-26 PROCEDURE — 97535 SELF CARE MNGMENT TRAINING: CPT

## 2025-02-26 PROCEDURE — 97166 OT EVAL MOD COMPLEX 45 MIN: CPT

## 2025-02-26 PROCEDURE — 97530 THERAPEUTIC ACTIVITIES: CPT

## 2025-02-26 RX ORDER — FENTANYL/ROPIVACAINE/NS/PF 2-625MCG/1
15 PLASTIC BAG, INJECTION (ML) EPIDURAL ONCE
Status: DISCONTINUED | OUTPATIENT
Start: 2025-02-26 | End: 2025-02-26

## 2025-02-26 RX ORDER — HYDROXYZINE HYDROCHLORIDE 25 MG/1
25 TABLET, FILM COATED ORAL 3 TIMES DAILY PRN
Status: DISCONTINUED | OUTPATIENT
Start: 2025-02-26 | End: 2025-03-05 | Stop reason: HOSPADM

## 2025-02-26 RX ADMIN — TAMSULOSIN HYDROCHLORIDE 0.4 MG: 0.4 CAPSULE ORAL at 17:07

## 2025-02-26 RX ADMIN — CEFTAZIDIME 2000 MG: 2 INJECTION, POWDER, FOR SOLUTION INTRAVENOUS at 17:06

## 2025-02-26 RX ADMIN — Medication 1 PACKET: at 12:09

## 2025-02-26 RX ADMIN — DICYCLOMINE HYDROCHLORIDE 10 MG: 10 CAPSULE ORAL at 08:40

## 2025-02-26 RX ADMIN — HYDROCODONE BITARTRATE AND ACETAMINOPHEN 1 TABLET: 7.5; 325 TABLET ORAL at 04:05

## 2025-02-26 RX ADMIN — Medication 1 PACKET: at 07:31

## 2025-02-26 RX ADMIN — Medication 1 PACKET: at 17:07

## 2025-02-26 RX ADMIN — CEFTAZIDIME 2000 MG: 2 INJECTION, POWDER, FOR SOLUTION INTRAVENOUS at 05:34

## 2025-02-26 RX ADMIN — Medication 10 ML: at 08:39

## 2025-02-26 RX ADMIN — DICYCLOMINE HYDROCHLORIDE 10 MG: 10 CAPSULE ORAL at 22:05

## 2025-02-26 RX ADMIN — SODIUM CHLORIDE 100 ML/HR: 9 INJECTION, SOLUTION INTRAVENOUS at 12:09

## 2025-02-26 RX ADMIN — DICYCLOMINE HYDROCHLORIDE 10 MG: 10 CAPSULE ORAL at 17:07

## 2025-02-26 NOTE — THERAPY EVALUATION
Patient Name: Juan Marley  : 1940    MRN: [id number]                              Today's Date: 2025       Admit Date: 2025    Visit Dx:     ICD-10-CM ICD-9-CM   1. Acute pain of left knee  M25.562 719.46   2. Pyogenic arthritis of left knee joint, due to unspecified organism  M00.9 711.06   3. S/P revision of total knee, left  Z96.652 V43.65   4. Acute kidney injury  N17.9 584.9   5. Infection of total left knee replacement  T84.54XA 996.66     Patient Active Problem List   Diagnosis    S/P revision of total knee, left    Knee pain    Essential tremor    Essential hypertension    Dehydration    NATALIO (acute kidney injury)    Fever    Generalized weakness    Pyogenic arthritis of left knee joint    Confusion    Abnormal liver function test     Past Medical History:   Diagnosis Date    Enlarged prostate     Essential tremor     BOTH HANDS    Hypertension     Knee pain, left     Rotator cuff tear, left     Wears dentures      Past Surgical History:   Procedure Laterality Date    CATARACT EXTRACTION, BILATERAL      COLONOSCOPY      ELBOW OLECRANNON BURSA EXCISION Right     X2    ENDOSCOPY      KNEE MINI REVISION Left 2025    Procedure: LEFT  TOTAL KNEE ARTHROPLASTY REVISION POLYETHYLENE EXCHANGE;  Surgeon: Wilmer Bruce MD;  Location: [facility] OR [facility] [place];  Service: Orthopedics;  Laterality: Left;    SPINE SURGERY      SPINAL STENOSIS    SPINE SURGERY  2016    RUPTURED DISC    TONSILLECTOMY AND ADENOIDECTOMY      AGES 20S    TOTAL KNEE ARTHROPLASTY Left 2008    VASECTOMY        General Information       Row Name 25 1022          Physical Therapy Time and Intention    Document Type evaluation  -     Mode of Treatment physical therapy  -       Row Name 25 1022          General Information    Patient Profile Reviewed yes  -AH     Prior Level of Function independent:;all household mobility;community mobility;ADL's;driving;home management  pt is current with outpatient therapy,and was  progressing well.  -     Existing Precautions/Restrictions fall  -     Barriers to Rehab none identified  -[facility] Name 02/26/25 1022          Living Environment    People in Home alone  -[facility] Name 02/26/25 1022          Home Main Entrance    Number of Stairs, Main Entrance one  -[facility] Name 02/26/25 1022          Stairs Within Home, Primary    Number of Stairs, Within Home, Primary none  -[facility] Name 02/26/25 1022          Cognition    Orientation Status (Cognition) oriented to;person;situation;place;disoriented to;time  fluctuating confusion since surgery. Seems to tolerate pain medicine poorly, resulting in confusion and poor safety awareness/sequencing.  -[facility] Name 02/26/25 1022          Safety Issues/Impairments Affecting Functional Mobility    Safety Issues Affecting Function (Mobility) ability to follow commands;at risk behavior observed;friction/shear risk;problem-solving;judgment;positioning of assistive device;safety precaution awareness;sequencing abilities  -     Impairments Affecting Function (Mobility) strength;pain;range of motion (ROM);endurance/activity tolerance;balance;cognition  -               User Key  (r) = Recorded By, (t) = Taken By, (c) = Cosigned By      Initials Name Provider Type     Maria M Rossi, PT Physical Therapist                   Mobility       [facility] Name 02/26/25 1122          Bed Mobility    Bed Mobility bed mobility (all) activities  -     All Activities, [place] (Bed Mobility) contact guard;minimum assist (75% patient effort)  -     Comment, (Bed Mobility) supine to sit with compensatory technique RLE ankle supporting LLE and assisting to advance over the EOB. Pt with difficulty pushing up to sitting, requiring support at trunk (Irina) to not fall back down onto R side.  -[facility] Name 02/26/25 1122          Transfers    Comment, (Transfers) max cues for technique. Requires PT assist to progress the walker.  -[facility]  Name 02/26/25 1122          Bed-Chair Transfer    Bed-Chair [place] (Transfers) moderate assist (50% patient effort);2 person assist  -     Assistive Device (Bed-Chair Transfers) walker, front-wheeled  -[facility] Name 02/26/25 1122          Sit-Stand Transfer    Sit-Stand [place] (Transfers) moderate assist (50% patient effort);2 person assist  -     Assistive Device (Sit-Stand Transfers) walker, front-wheeled  -     Comment, (Sit-Stand Transfer) max cues needed for set up and hand placement.  -[facility] Name 02/26/25 1122          Gait/Stairs (Locomotion)    Patient was able to Ambulate no, other medical factors prevent ambulation  -     Reason Patient was unable to Ambulate Uncontrolled Pain;Excessive Weakness  unable to WB on LLE  -               User Key  (r) = Recorded By, (t) = Taken By, (c) = Cosigned By      Initials Name Provider Type     Maria M Rossi, PT Physical Therapist                   Obj/Interventions       [facility] Name 02/26/25 1126          Range of Motion Comprehensive    Comment, General Range of Motion RLE WFL. LLE  ~75% limited. L knee AROM 0-20-50.  Simultaneous filing. User may be unaware of other data.  -[facility] Name 02/26/25 1126          Strength Comprehensive (MMT)    General Manual Muscle Testing (MMT) Assessment lower extremity strength deficits identified  -     Comment, General Manual Muscle Testing (MMT) Assessment LLE grossly 2-/5, RLE WFL.  Simultaneous filing. User may be unaware of other data.  -[facility] Name 02/26/25 1126          Motor Skills    Motor Skills functional endurance  -     Functional Endurance FAIR (-)  -AH       Row Name 02/26/25 [facility]6          Balance    Balance Assessment sitting static balance;standing dynamic balance;standing static balance;sitting dynamic balance  -     Static Sitting Balance supervision  -     Dynamic Sitting Balance contact guard  -     Position, Sitting Balance sitting edge of bed;supported   heavy reliance on UE support on bedrail.  -     Static Standing Balance moderate assist;2-person assist  -     Dynamic Standing Balance moderate assist;2-person assist  -     Position/Device Used, Standing Balance walker, front-wheeled  -     Comment, Balance impaired posture, fear, and poor LLE WB tolerance negatively impact standing balance  -[facility] Name 02/26/25 1126          Sensory Assessment (Somatosensory)    Sensory Assessment (Somatosensory) sensation intact  -               User Key  (r) = Recorded By, (t) = Taken By, (c) = Cosigned By      Initials Name Provider Type     Maria M Rossi, PT Physical Therapist                   Goals/Plan       [facility] Name 02/26/25 1139          Bed Mobility Goal 1 (PT)    Activity/Assistive Device (Bed Mobility Goal 1, PT) sit to supine/supine to sit  -     [place] Level/Cues Needed (Bed Mobility Goal 1, PT) modified independence  -AH     Time Frame (Bed Mobility Goal 1, PT) long term goal (LTG);2 weeks  -[facility] Name 02/26/25 1139          Transfer Goal 1 (PT)    Activity/Assistive Device (Transfer Goal 1, PT) sit-to-stand/stand-to-sit;bed-to-chair/chair-to-bed;walker, rolling  -     [place] Level/Cues Needed (Transfer Goal 1, PT) modified independence  -AH     Time Frame (Transfer Goal 1, PT) long term goal (LTG);2 weeks  -[facility] Name 02/26/25 1139          Gait Training Goal 1 (PT)    Activity/Assistive Device (Gait Training Goal 1, PT) gait (walking locomotion);walker, rolling  -     [place] Level (Gait Training Goal 1, PT) modified independence  -     Distance (Gait Training Goal 1, PT) 50'  -     Time Frame (Gait Training Goal 1, PT) long term goal (LTG);2 weeks  -[facility] Name 02/26/25 1139          ROM Goal 1 (PT)    ROM Goal 1 (PT) Surgical Knee ROM: 0-90  -     Time Frame (ROM Goal 1, PT) long-term goal (LTG);2 weeks  -[facility] Name 02/26/25 1139          Stairs Goal 1 (PT)    Activity/Assistive Device  (Stairs Goal 1, PT) stairs, all skills  -     [place] Level/Cues Needed (Stairs Goal 1, PT) modified independence  -     Number of Stairs (Stairs Goal 1, PT) 1  -     Time Frame (Stairs Goal 1, PT) long term goal (LTG);2 weeks  -       Row Name 02/26/25 1138          Therapy Assessment/Plan (PT)    Planned Therapy Interventions (PT) balance training;bed mobility training;gait training;home exercise program;ROM (range of motion);patient/family education;stair training;strengthening;stretching;transfer training  -               User Key  (r) = Recorded By, (t) = Taken By, (c) = Cosigned By      Initials Name Provider Type     Maria M Rossi, PT Physical Therapist                   Clinical Impression       Row Name 02/26/25 1130          Pain    Additional Documentation Pain Scale: FACES Pre/Post-Treatment (Group)  -[facility] Name 02/26/25 1130          Pain Scale: FACES Pre/Post-Treatment    Pain: FACES Scale, Pretreatment 6-->hurts even more  -     Posttreatment Pain Rating 8-->hurts whole lot  -       Row Name 02/26/25 1130          Plan of Care Review    Plan of Care Reviewed With patient  -     Outcome Evaluation Pt is an 83 y/o male ~4 wks 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 He's now POD 1 left polyethylene insert exchange and washout. WBAT LLE. Pt lives alone in a H with 1 small SUZANNE. He needed assistance initially after his revision but had since progressed to being independent with all mobility, including driving himself to/from outpatient therapy. Pt had a fall on 2/23/25. He does not have 24/7 care/assist available. Per family report pt is usually alert and oriented with no confusion. He presents today anxious and mildly confused. He requires modA x2 for sit to stand transfers, bed to  BSC, and BSC to recliner with RW. Pt is unable to tolerate WB on LLE and with significant difficulty advancing the LLE. He is also unable to advance with walker during pivot turns. Max cues are needed for sequencing and max reassurance provided for all OOB activity. Pt is a very high fall risk and is not safe to DC home at alone at this time. PT recommends SNF. PT will follow.  -       Row Name 02/26/25 1130          Therapy Assessment/Plan (PT)    Patient/Family Therapy Goals Statement (PT) get stronger, less pain, be able to walk.  -     Rehab Potential (PT) good  -     Criteria for Skilled Interventions Met (PT) yes;meets criteria  -     Therapy Frequency (PT) 5 times/wk  -       Row Name 02/26/25 1130          Positioning and Restraints    Post Treatment Position chair  -     In Chair notified nsg;reclined;call light within reach;encouraged to call for assist;exit alarm on;with other staff  cold packs applied to Lknee. Hemovac intact. Transfer techniques discussed with nurse and nursing assistant. Assist x2 recommended.  -               User Key  (r) = Recorded By, (t) = Taken By, (c) = Cosigned By      Initials Name Provider Type     Maria M Rossi, PT Physical Therapist                   Outcome Measures       Row Name 02/26/25 1140 02/26/25 0839       How much help from another person do you currently need...    Turning from your back to your side while in flat bed without using bedrails? 3  -AH 3  -LP    Moving from lying on back to sitting on the side of a flat bed without bedrails? 3  - 3  -LP    Moving to and from a bed to a chair (including a wheelchair)? 2  - 3  -LP    Standing up from a chair using your arms (e.g., wheelchair, bedside chair)? 2  - 3  -LP    Climbing 3-5 steps with a railing? 1  - 2  -LP    To walk in hospital room? 1  - 2  -LP    AM-PAC 6 Clicks Score (PT) 12  -AH 16  -LP    Highest Level of Mobility Goal 4 --> Transfer to chair/commode  - 5 --> Static  standing  -LP      Row Name 02/26/25 1140          Functional Assessment    Outcome Measure Options AM-PAC 6 Clicks Basic Mobility (PT)  -               User Key  (r) = Recorded By, (t) = Taken By, (c) = Cosigned By      Initials Name Provider Type     Maria M Rossi, JUMANA Physical Therapist    Jolanta Dao LPN Licensed Nurse                                 Physical Therapy Education       Title: PT OT SLP Therapies (Done)       Topic: Physical Therapy (Done)       Point: Mobility training (Done)       Learning Progress Summary            Patient Acceptance, E, VU by  at 2/26/2025 1141                      Point: Home exercise program (Done)       Learning Progress Summary            Patient Acceptance, E, VU by  at 2/26/2025 1141                      Point: Body mechanics (Done)       Learning Progress Summary            Patient Acceptance, E, VU by  at 2/26/2025 1141                      Point: Precautions (Done)       Learning Progress Summary            Patient Acceptance, E, VU by  at 2/26/2025 1141                                      User Key       Initials Effective Dates Name Provider Type [facility] 12/04/23 -  Maria M Rossi, PT Physical Therapist PT                  PT Recommendation and Plan  Planned Therapy Interventions (PT): balance training, bed mobility training, gait training, home exercise program, ROM (range of motion), patient/family education, stair training, strengthening, stretching, transfer training  Outcome Evaluation: Pt is an 83 y/o male ~4 wks 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 He's now POD 1 left polyethylene insert exchange and washout. WBAT LLE. Pt lives alone in a [facility] with 1 small SUZANNE. He needed assistance initially after his revision but had since progressed to  being independent with all mobility, including driving himself to/from outpatient therapy. Pt had a fall on 2/23/25. He does not have 24/7 care/assist available. Per family report pt is usually alert and oriented with no confusion. He presents today anxious and mildly confused. He requires modA x2 for sit to stand transfers, bed to BSC, and BSC to recliner with RW. Pt is unable to tolerate WB on LLE and with significant difficulty advancing the LLE. He is also unable to advance with walker during pivot turns. Max cues are needed for sequencing and max reassurance provided for all OOB activity. Pt is a very high fall risk and is not safe to DC home at alone at this time. PT recommends SNF. PT will follow.     Time Calculation:         PT Charges       Row Name 02/26/25 1141             Time Calculation    Start Time 1006  -      Stop Time 1037  -      Time Calculation (min) 31 min  -      PT Non-Billable Time (min) 0 min  -      PT Received On 02/26/25  -      PT - Next Appointment 02/27/25  -      PT Goal Re-Cert Due Date 03/12/25  -         Time Calculation- PT    Total Timed Code Minutes- PT 10 minute(s)  -                User Key  (r) = Recorded By, (t) = Taken By, (c) = Cosigned By      Initials Name Provider Type     Maria M Rossi, JUMANA Physical Therapist                  Therapy Charges for Today       Code Description Service Date Service Provider Modifiers Qty    [id number]  PT EVAL MOD COMPLEXITY 4 2/26/2025 Maria M Rossi, PT GP 1    [id number]  PT THERAPEUTIC ACT EA 15 MIN 2/26/2025 Maria M Rossi, PT GP 1            PT G-Codes  Outcome Measure Options: AM-PAC 6 Clicks Basic Mobility (PT)  AM-PAC 6 Clicks Score (PT): 12  PT Discharge Summary  Anticipated Discharge Disposition (PT): skilled nursing facility    Maria M Rossi PT  2/26/2025

## 2025-02-26 NOTE — PROGRESS NOTES
LOS: 2 days   Patient Care Team:  Rolf Harvey MD as PCP - General (Family Medicine)  Cedric Jacobs MD as Consulting Physician (Nephrology)    Subjective     Interval History:    Patient Complaints: Just worked with PT and now sitting in chair.  Complains of significant left knee pain.  Also complains of significant itching and irritation from rash covering back    History taken from: patient    Review of Systems   Constitutional:  Positive for activity change, appetite change and fatigue. Negative for chills, diaphoresis and fever.   HENT:  Negative for facial swelling.    Eyes:  Negative for visual disturbance.   Respiratory:  Negative for cough, shortness of breath, wheezing and stridor.    Cardiovascular:  Negative for chest pain and leg swelling.   Gastrointestinal:  Negative for abdominal pain, constipation, diarrhea, nausea and vomiting.   Musculoskeletal:  Positive for arthralgias and gait problem.   Skin:  Positive for rash.   Neurological:  Negative for weakness.   Psychiatric/Behavioral:  Positive for confusion.            Objective     Vital Signs  Temp:  [97.8 °F (36.6 °C)-98.4 °F (36.9 °C)] 98.2 °F (36.8 °C)  Heart Rate:  [56-70] 70  Resp:  [8-15] 14  BP: ()/(47-69) 131/69    Physical Exam:     General Appearance:    Alert, cooperative, in no acute distress, oriented x 1   Head:    Normocephalic, without obvious abnormality, atraumatic   Eyes:            Lids and lashes normal, conjunctivae and sclerae normal, no   icterus, no pallor, corneas clear, PERRLA   Ears:    Ears appear intact with no abnormalities noted   Throat:   No oral lesions, no thrush, oral mucosa moist   Neck:   No adenopathy, supple, trachea midline, no thyromegaly, no   carotid bruit, no JVD   Lungs:     Clear to auscultation,respirations regular, even and                  unlabored    Heart:    Regular rhythm and normal rate, normal S1 and S2, no            murmur, no gallop, no rub, no click   Chest Wall:     No abnormalities observed   Abdomen:     Normal bowel sounds, no masses, no organomegaly, soft        Non-tender non-distended, no guarding,   Extremities:   Left leg - drain intact, dressing in place; toes are NVI   Pulses:   Pulses palpable and equal bilaterally   Skin:   No bleeding, bruising, rash noted covering back   Lymph nodes:   No palpable adenopathy   Neurologic:   Cranial nerves 2 - 12 grossly intact, sensation intact, DTR       present and equal bilaterally        Results Review:    Lab Results (last 24 hours)       Procedure Component Value Units Date/Time    Tissue / Bone Culture - Tissue, Knee, Left [[id number]] Collected: 02/25/25 0741    Specimen: Tissue from Knee, Left Updated: 02/26/25 0931     Tissue Culture Growth present, too young to evaluate     Gram Stain Many (4+) WBCs per low power field      Rare (1+) Gram negative bacilli    Tissue / Bone Culture - Tissue, Knee, Left [[id number]] Collected: 02/25/25 0743    Specimen: Tissue from Knee, Left Updated: 02/26/25 0905     Tissue Culture Growth present, too young to evaluate     Gram Stain Many (4+) WBCs per low power field      Rare (1+) Gram negative bacilli    Blood Culture - Blood, Arm, Left [[id number]]  (Normal) Collected: 02/24/25 0820    Specimen: Blood from Arm, Left Updated: 02/26/25 0830     Blood Culture No growth at 2 days    Narrative:      Less than seven (7) mL's of blood was collected.  Insufficient quantity may yield false negative results.    Body Fluid Culture - Synovial Fluid, Knee, Left [[id number]] Collected: 02/24/25 1000    Specimen: Synovial Fluid from Knee, Left Updated: 02/26/25 0741     Body Fluid Culture No growth at 2 days     Gram Stain Many (4+) WBCs per low power field      No organisms seen    Blood Culture - Blood, Arm, Left [[id number]]  (Normal) Collected: 02/24/25 0723    Specimen: Blood from Arm, Left Updated: 02/26/25 0730     Blood Culture No growth at 2 days    Renal Function Panel [[id number]]  (Abnormal)  Collected: 02/26/25 0401    Specimen: Blood from Arm, Left Updated: 02/26/25 0440     Glucose 117 mg/dL      BUN 32 mg/dL      Creatinine 1.21 mg/dL      Sodium 138 mmol/L      Potassium 4.0 mmol/L      Chloride 106 mmol/L      CO2 22.5 mmol/L      Calcium 8.0 mg/dL      Albumin 3.1 g/dL      Phosphorus 1.9 mg/dL      Anion Gap 9.5 mmol/L      BUN/Creatinine Ratio 26.4     eGFR 59.8 mL/min/1.73     Narrative:      GFR Categories in Chronic Kidney Disease (CKD)      GFR Category          GFR (mL/min/1.73)    Interpretation  G1                     90 or greater         Normal or high (1)  G2                      60-89                Mild decrease (1)  G3a                   45-59                Mild to moderate decrease  G3b                   30-44                Moderate to severe decrease  G4                    15-29                Severe decrease  G5                    14 or less           Kidney failure          (1)In the absence of evidence of kidney disease, neither GFR category G1 or G2 fulfill the criteria for CKD.    eGFR calculation 2021 CKD-EPI creatinine equation, which does not include race as a factor    CBC & Differential [[id number]]  (Abnormal) Collected: 02/26/25 0401    Specimen: Blood from Arm, Left Updated: 02/26/25 0413    Narrative:      The following orders were created for panel order CBC & Differential.  Procedure                               Abnormality         Status                     ---------                               -----------         ------                     CBC Auto Differential[[id number]]        Abnormal            Final result                 Please view results for these tests on the individual orders.    CBC Auto Differential [[id number]]  (Abnormal) Collected: 02/26/25 0401    Specimen: Blood from Arm, Left Updated: 02/26/25 0413     WBC 9.24 10*3/mm3      RBC 3.82 10*6/mm3      Hemoglobin 11.7 g/dL      Hematocrit 35.8 %      MCV 93.7 fL      MCH 30.6 pg      MCHC 32.7  g/dL      RDW 12.6 %      RDW-SD 43.6 fl      MPV 11.3 fL      Platelets 162 10*3/mm3      Neutrophil % 81.9 %      Lymphocyte % 7.1 %      Monocyte % 7.6 %      Eosinophil % 2.6 %      Basophil % 0.2 %      Immature Grans % 0.6 %      Neutrophils, Absolute 7.56 10*3/mm3      Lymphocytes, Absolute 0.66 10*3/mm3      Monocytes, Absolute 0.70 10*3/mm3      Eosinophils, Absolute 0.24 10*3/mm3      Basophils, Absolute 0.02 10*3/mm3      Immature Grans, Absolute 0.06 10*3/mm3      nRBC 0.0 /100 WBC     Urinalysis With Culture If Indicated - Urine, Clean Catch [[id number]]  (Abnormal) Collected: 02/25/25 2012    Specimen: Urine, Clean Catch Updated: 02/25/25 2022     Color, UA Dark Yellow     Appearance, UA Clear     pH, UA 5.5     Specific Gravity, UA 1.026     Glucose, UA Negative     Ketones, UA Negative     Bilirubin, UA Negative     Blood, UA Negative     Protein, UA 30 mg/dL (1+)     Leuk Esterase, UA Negative     Nitrite, UA Negative     Urobilinogen, UA 1.0 E.U./dL    Narrative:      In absence of clinical symptoms, the presence of pyuria, bacteria, and/or nitrites on the urinalysis result does not correlate with infection.    Urinalysis, Microscopic Only - Urine, Clean Catch [[id number]] Collected: 02/25/25 2012    Specimen: Urine, Clean Catch Updated: 02/25/25 2022     RBC, UA 0-2 /HPF      WBC, UA 0-2 /HPF      Comment: Urine culture not indicated.        Bacteria, UA None Seen /HPF      Squamous Epithelial Cells, UA 0-2 /HPF      Hyaline Casts, UA 0-2 /LPF      Methodology Automated Microscopy    Phosphorus [[id number]]  (Normal) Collected: 02/25/25 1144    Specimen: Blood from Arm, Right Updated: 02/25/25 1227     Phosphorus 3.5 mg/dL     Ammonia [[id number]]  (Normal) Collected: 02/25/25 1144    Specimen: Blood from Arm, Right Updated: 02/25/25 1217     Ammonia 34 umol/L     Hepatic Function Panel [[id number]]  (Abnormal) Collected: 02/25/25 0012    Specimen: Blood from Arm, Right Updated: 02/25/25 1117     Total  Protein 5.6 g/dL      Albumin 3.2 g/dL      ALT (SGPT) 99 U/L      AST (SGOT) 86 U/L      Alkaline Phosphatase 57 U/L      Total Bilirubin 0.8 mg/dL      Bilirubin, Direct 0.4 mg/dL      Bilirubin, Indirect 0.4 mg/dL     Wound Culture - Surgical Site, Knee, Left [[id number]] Collected: 02/25/25 0737    Specimen: Surgical Site from Knee, Left Updated: 02/25/25 1010     Gram Stain Many (4+) WBCs per low power field      No organisms seen             Imaging Results (Last 24 Hours)       Procedure Component Value Units Date/Time    CT Head Without Contrast [[id number]] Collected: 02/25/25 1233     Updated: 02/25/25 1237    Narrative:      CT HEAD WO CONTRAST    Date of Exam: 2/25/2025 12:22 PM EST    Indication: confusion.    Comparison: None available.    Technique: Axial CT images were obtained of the head without contrast administration.  Coronal reconstructions were performed.  Automated exposure control and iterative reconstruction methods were used.      Findings:  Negative for large territory infarct, acute intracranial hemorrhage, large mass lesion, hydrocephalus or midline shift. There is mild global parenchymal volume loss for age. Minimal periventricular hypodensity most commonly reflecting sequelae of chronic   microvascular ischemic change. Vascular calcifications. No large extra-axial fluid collection. Leftward deviated septum and degenerative spurring. No obstructive sinus disease or layering fluid. No large mastoid effusion. Calvarium grossly unremarkable.      Impression:      Impression:  No acute intracranial findings by CT.    Mild global parenchymal volume loss and presumed sequela of chronic microvascular ischemic change.      Electronically Signed: Erick Gilmore MD    2/25/2025 12:35 PM EST    Workstation ID: [id number]    US Liver [[id number]] Collected: 02/25/25 1229     Updated: 02/25/25 1233    Narrative:      US LIVER    Date of Exam: 2/25/2025 12:11 PM EST    Indication: abnormal lft's,  confusion.    Comparison: No comparisons available.    Technique: Grayscale and color Doppler ultrasound evaluation of the right upper quadrant was performed.      Findings:  Liver has homogenous echogenicity and normal echotexture.  No focal hepatic lesion. Hepatopetal flow of the main portal vein. No visualized ascites.    Numerous echogenic shadowing gallstones. Gallbladder is distended without significant wall thickening or pericholecystic fluid.    Mild intrahepatic duct dilation. Dilated extrahepatic biliary tree, common bile duct measures up to 1.3 cm abnormal for age.    The visualized portions of the head and body of the pancreas are grossly unremarkable. The pancreatic tail is not seen due to bowel gas.    Homogeneous cortical echotexture of the right kidney. No hydronephrosis, shadowing echogenicities or solid masses.          Impression:      1. Cholelithiasis. Distended gallbladder without wall thickening or pericholecystic fluid. Correlate for clinical signs of acute cholecystitis.  2. Dilated biliary tree for age. If there is concern for choledocholithiasis or other downstream obstruction recommend MRCP.      Electronically Signed: Erick Gilmore MD    2/25/2025 12:31 PM EST    Workstation ID: [id number]    XR Knee 1 or 2 View Left [[id number]] Collected: 02/25/25 0934     Updated: 02/25/25 0938    Narrative:      XR KNEE 1 OR 2 VW LEFT    Date of Exam: 2/25/2025 9:26 AM EST    Indication: post op    Comparison: Left knee radiograph 2/24/2025    Findings:  Total knee arthroplasty in anatomic alignment. Small less conspicuous joint effusion as well as decreasing soft tissue edema most significant along the anterior aspect of the knee joint. No evidence of loosening, hardware fracture or periprosthetic   fracture. Surgical drain noted.      Impression:      Impression:  Decreasing soft tissue swelling and joint fluid. Radiographically uncomplicated total arthroplasty in anatomic  alignment.      Electronically Signed: Erick Gilmore MD    2/25/2025 9:36 AM EST    Workstation ID: [id number]                 I reviewed the patient's new clinical results.    Medication Review:   Scheduled Meds:[Held by provider] aspirin, 81 mg, Oral, BID  cefTAZidime, 2,000 mg, Intravenous, Q12H  dicyclomine, 10 mg, Oral, TID  [Held by provider] gabapentin, 300 mg, Oral, TID  potassium & sodium phosphates, 1 packet, Oral, TID AC  sodium chloride, 10 mL, Intravenous, Q12H  tamsulosin, 0.4 mg, Oral, Q PM      Continuous Infusions:sodium chloride, 100 mL/hr, Last Rate: 100 mL/hr (02/25/25 2006)      PRN Meds:.  acetaminophen **OR** acetaminophen **OR** acetaminophen    senna-docusate sodium **AND** polyethylene glycol **AND** bisacodyl **AND** bisacodyl    Calcium Replacement - Follow Nurse / BPA Driven Protocol    hydrALAZINE    HYDROcodone-acetaminophen    HYDROmorphone    Magnesium Standard Dose Replacement - Follow Nurse / BPA Driven Protocol    melatonin    nitroglycerin    ondansetron ODT **OR** ondansetron    Phosphorus Replacement - Follow Nurse / BPA Driven Protocol    Potassium Replacement - Follow Nurse / BPA Driven Protocol    sodium chloride    sodium chloride     Assessment & Plan       Pyogenic arthritis of left knee joint    S/P revision of total knee, left    Knee pain    Essential tremor    Essential hypertension    Dehydration    NATALIO (acute kidney injury)    Fever    Generalized weakness    Confusion    Abnormal liver function test    - continue ceftazidime, ID following; may need further surgery to remove hardware, drain in place, intra-operative cultures pending  - creatinine improving with hydration  - LFT's improving; liver ultrasound unremarkable; ammonia level normal  - altered mental status, resolved- normal head ct, ammonia level.  UA was abnormal but contaminated.   -Will add hydroxyzine as needed for itching  -Pain control    SCD's for dvt prophy       Plan for disposition:SNF -  anticipate prolonged antibiotic course with poor mobility    GONZALO Matthews  02/26/25  09:32 EST

## 2025-02-26 NOTE — PLAN OF CARE
Goal Outcome Evaluation:      Patient alert and oriented x 4. Able to make needs known. Pain treated per medications on the MAR. Patient up to bedside commode, heavy 2 assist, did not tolerate well, increase in pain. Personal items and call light in reach. Plan of care is ongoing.

## 2025-02-26 NOTE — PROGRESS NOTES
"    Procedure(s):  KNEE POLYETHYLENE INSERT EXCHANGE AND WASHOUT     LOS: 2 days     Subjective :   Complains of pain in the left knee that is controlled with meds.    Objective :    Vital signs in last 24 hours:  Vitals:    02/25/25 1120 02/25/25 2145 02/26/25 0004 02/26/25 0401   BP: 98/47 126/58 121/53 139/63   BP Location: Right arm Right arm Right arm Right arm   Patient Position: Lying Lying Lying Sitting   Pulse: 56 64     Resp: 14 15 11 15   Temp: 97.8 °F (36.6 °C) 98.4 °F (36.9 °C) 98.3 °F (36.8 °C) 98.3 °F (36.8 °C)   TempSrc: Oral Oral Oral Oral   SpO2: 93% 98% 99%    Weight:       Height:           PHYSICAL EXAM:  Patient is calm, in no acute distress, awake and oriented x 3.  Dressing is clean, dry and intact.  Swelling is appropriate in amount.  Ecchymosis is appropriate in amount.  Homans test is negative.  Patient is neurovascularly intact distally.    LABS:  Results from last 7 days   Lab Units 02/26/25  0401   WBC 10*3/mm3 9.24   HEMOGLOBIN g/dL 11.7*   HEMATOCRIT % 35.8*   PLATELETS 10*3/mm3 162     Results from last 7 days   Lab Units 02/26/25  0401   SODIUM mmol/L 138   POTASSIUM mmol/L 4.0   CHLORIDE mmol/L 106   CO2 mmol/L 22.5   BUN mg/dL 32*   CREATININE mg/dL 1.21   GLUCOSE mg/dL 117*   CALCIUM mg/dL 8.0*     Results from last 7 days   Lab Units 02/25/25  0012 02/24/25  0723   INR  1.23* 1.16*   APTT seconds 34.3 31.2         ASSESSMENT:  Status post Procedure(s):  KNEE POLYETHYLENE INSERT EXCHANGE AND WASHOUT      Plan:  WBAT on LLE  Aspirin for DVT prophylaxis  Antibiottics per ID  He still has his drain.  The notes say there was no drainage this morning, but he says there was \"a lot\".  We can leave it for now and remove if it is still a low output.    Harry Thomas MD    Date: 2/26/2025  Time: 08:07 EST   "

## 2025-02-26 NOTE — THERAPY EVALUATION
Patient Name: Juan Marley  : 1940    MRN: [id number]                              Today's Date: 2025       Admit Date: 2025    Visit Dx:     ICD-10-CM ICD-9-CM   1. Acute pain of left knee  M25.562 719.46   2. Pyogenic arthritis of left knee joint, due to unspecified organism  M00.9 711.06   3. S/P revision of total knee, left  Z96.652 V43.65   4. Acute kidney injury  N17.9 584.9   5. Infection of total left knee replacement  T84.54XA 996.66     Patient Active Problem List   Diagnosis    S/P revision of total knee, left    Knee pain    Essential tremor    Essential hypertension    Dehydration    NATALIO (acute kidney injury)    Fever    Generalized weakness    Pyogenic arthritis of left knee joint    Confusion    Abnormal liver function test     Past Medical History:   Diagnosis Date    Enlarged prostate     Essential tremor     BOTH HANDS    Hypertension     Knee pain, left     Rotator cuff tear, left     Wears dentures      Past Surgical History:   Procedure Laterality Date    CATARACT EXTRACTION, BILATERAL      COLONOSCOPY      ELBOW OLECRANNON BURSA EXCISION Right     X2    ENDOSCOPY      KNEE MINI REVISION Left 2025    Procedure: LEFT  TOTAL KNEE ARTHROPLASTY REVISION POLYETHYLENE EXCHANGE;  Surgeon: Wilmer Bruce MD;  Location: [facility] OR [facility] [place];  Service: Orthopedics;  Laterality: Left;    SPINE SURGERY  2015    SPINAL STENOSIS    SPINE SURGERY  2016    RUPTURED DISC    TONSILLECTOMY AND ADENOIDECTOMY      AGES 20S    TOTAL KNEE ARTHROPLASTY Left 2008    VASECTOMY        General Information       Row Name 25 1111          OT Time and Intention    Subjective Information complains of;weakness;pain;fatigue  -MH     Document Type evaluation  -MH     Patient Effort excellent  -MH     Symptoms Noted During/After Treatment other (see comments);increased pain  anxiety, distracted  -       Row Name 25 1111          General Information    Prior Level of Function independent:;ADL's;all  household mobility;community mobility;driving  pt had been driving himself to his OP PT. unsure if he had advanced off the RW yet or not.  -     Existing Precautions/Restrictions fall;other (see comments)  hemovac, Lt TKA revision, full-leg ace wrap quads to toes  -     Barriers to Rehab none identified  -       Row Name 02/26/25 1111          Living Environment    People in Home alone  -       Row Name 02/26/25 1111          Home Main Entrance    Number of Stairs, Main Entrance one  -       Row Name 02/26/25 1111          Stairs Within Home, Primary    Number of Stairs, Within Home, Primary none  -       Row Name 02/26/25 1111          Cognition    Orientation Status (Cognition) other (see comments)  pt is grossly oriented though coiuld not give the correct moth without cues and RN states he was confused post-op and after begin given strong pain medication this morning early. Medication program is being adjusted.  -       Row Name 02/26/25 1111          Safety Issues/Impairments Affecting Functional Mobility    Safety Issues Affecting Function (Mobility) ability to follow commands;awareness of need for assistance;at risk behavior observed;problem-solving;judgment;sequencing abilities  -     Impairments Affecting Function (Mobility) balance;cognition;endurance/activity tolerance;pain;strength;postural/trunk control;range of motion (ROM)  -               User Key  (r) = Recorded By, (t) = Taken By, (c) = Cosigned By      Initials Name Provider Type     Sheree Banks, OT Occupational Therapist                     Mobility/ADL's       Row Name 02/26/25 1121          Bed Mobility    Bed Mobility supine-sit  -     Supine-Sit [place] (Bed Mobility) 2 person assist;minimum assist (75% patient effort);verbal cues;nonverbal cues (demo/gesture)  -       Row Name 02/26/25 1121          Transfers    Transfers toilet transfer;bed-chair transfer;sit-stand transfer;stand-sit transfer  -       Row  Name 02/26/25 1121          Bed-Chair Transfer    Bed-Chair [place] (Transfers) 2 person assist;minimum assist (75% patient effort);moderate assist (50% patient effort);verbal cues;nonverbal cues (demo/gesture)  -     Assistive Device (Bed-Chair Transfers) walker, front-wheeled  -       Row Name 02/26/25 1121          Sit-Stand Transfer    Sit-Stand [place] (Transfers) 2 person assist;moderate assist (50% patient effort);verbal cues;nonverbal cues (demo/gesture)  -     Assistive Device (Sit-Stand Transfers) walker, front-wheeled  -       Row Name 02/26/25 1121          Stand-Sit Transfer    Stand-Sit [place] (Transfers) 2 person assist;minimum assist (75% patient effort);verbal cues;nonverbal cues (demo/gesture)  -       Row Name 02/26/25 1121          Toilet Transfer    Type (Toilet Transfer) stand pivot/stand step  -     [place] Level (Toilet Transfer) 2 person assist;moderate assist (50% patient effort)  -     Assistive Device (Toilet Transfer) walker, front-wheeled  -       Row Name 02/26/25 1121          Functional Mobility    Functional Mobility- Ind. Level 2 person assist required;moderate assist (50% patient effort)  -     Functional Mobility- Device walker, front-wheeled  -     Functional Mobility-Distance (Feet) --  x2  -     Patient was able to Ambulate no, other medical factors prevent ambulation  -     Reason Patient was unable to Ambulate Uncontrolled Pain;Excessive Weakness  -       Row Name 02/26/25 [facility]          Activities of Daily Living    BADL Assessment/Intervention lower body dressing;toileting;feeding  -       Row Name 02/26/25 [facility]          Lower Body Dressing Assessment/Training    [place] Level (Lower Body Dressing) don;socks;dependent (less than 25% patient effort)  -     Position (Lower Body Dressing) supine  -       Row Name 02/26/25 1121          Toileting Assessment/Training    [place] Level (Toileting) adjust/manage  clothing;perform perineal hygiene;maximum assist (25% patient effort)  -     Comment, (Toileting) used urinal seated on BSC  -       Row Name 02/26/25 1121          Self-Feeding Assessment/Training    [place] Level (Feeding) feeding skills;modified independence  -               User Key  (r) = Recorded By, (t) = Taken By, (c) = Cosigned By      Initials Name Provider Type     Sheree Banks OT Occupational Therapist                   Obj/Interventions       Row Name 02/26/25 1156          Motor Skills    Motor Skills coordination  -     Coordination gross motor deficit;fine motor deficit;other (see comments)  note BUE tremors of intention when managing urinal.  -               User Key  (r) = Recorded By, (t) = Taken By, (c) = Cosigned By      Initials Name Provider Type     Sheree Banks OT Occupational Therapist                   Goals/Plan    No documentation.                  Clinical Impression       Row Name 02/26/25 1130          Pain Assessment    Pretreatment Pain Rating 4/10  -     Posttreatment Pain Rating 5/10  -     Pain Location knee  -     Pain Side/Orientation left  -     Pre/Posttreatment Pain Comment WBAt but pt tolerates poorly bearing weight  -       Row Name 02/26/25 1159 02/26/25 1130       Plan of Care Review    Plan of Care Reviewed With -- patient  -    Progress -- improving  -    Outcome Evaluation Pt is an 83 y/o male ~4 wks 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 He's now POD 1 left polyethylene insert exchange and washout. WBAT LLE. Pt lives alone in a [facility] with 1 small SUZANNE. He needed assistance initially after his revision but had since progressed to being independent with all mobility, including driving himself to/from outpatient therapy. Pt had a fall on  2/23/25. He does not have 24/7 care/assist available. Per family report pt is usually alert and oriented with no confusion. He presents today anxious and mildly confused. He requires modA x2 for sit to stand transfers, bed to BSC, and BSC to recliner with RW. Pt is unable to tolerate WB on LLE and with significant difficulty advancing the LLE. He is also unable to advance with walker during pivot turns. Max cues are needed for sequencing and max reassurance provided for all OOB activity. Pt is a very high fall risk and is not safe to DC home at alone at this time. OT recommends SNF.  - --      Row Name 02/26/25 1130          Therapy Assessment/Plan (OT)    Rehab Potential (OT) good  -     Criteria for Skilled Therapeutic Interventions Met (OT) skilled treatment is necessary  -     Therapy Frequency (OT) 5 times/wk  -     Predicted Duration of Therapy Intervention (OT) until d/c  -       Row Name 02/26/25 1130          Therapy Plan Review/Discharge Plan (OT)    Anticipated Discharge Disposition (OT) skilled nursing facility  -               User Key  (r) = Recorded By, (t) = Taken By, (c) = Cosigned By      Initials Name Provider Type     Sheree Banks, OT Occupational Therapist                   Outcome Measures       Row Name 02/26/25 1140 02/26/25 0839       How much help from another person do you currently need...    Turning from your back to your side while in flat bed without using bedrails? 3  -AH 3  -LP    Moving from lying on back to sitting on the side of a flat bed without bedrails? 3  -AH 3  -LP    Moving to and from a bed to a chair (including a wheelchair)? 2  -AH 3  -LP    Standing up from a chair using your arms (e.g., wheelchair, bedside chair)? 2  -AH 3  -LP    Climbing 3-5 steps with a railing? 1  -AH 2  -LP    To walk in hospital room? 1  -AH 2  -LP    AM-PAC 6 Clicks Score (PT) 12  -AH 16  -LP    Highest Level of Mobility Goal 4 --> Transfer to chair/commode  -AH 5 --> Static  standing  -LP      Row Name 02/26/25 1140          Functional Assessment    Outcome Measure Options AM-PAC 6 Clicks Basic Mobility (PT)  -               User Key  (r) = Recorded By, (t) = Taken By, (c) = Cosigned By      Initials Name Provider Type     Maria M Rossi, PT Physical Therapist    Jolanta Dao LPN Licensed Nurse                    Occupational Therapy Education       Title: PT OT SLP Therapies (Done)       Topic: Occupational Therapy (Done)       Point: ADL training (Done)       Description:   Instruct learner(s) on proper safety adaptation and remediation techniques during self care or transfers.   Instruct in proper use of assistive devices.                  Learning Progress Summary            Patient Acceptance, E,TB,D, VU,DU,NR by  at 2/26/2025 1157                      Point: Home exercise program (Done)       Description:   Instruct learner(s) on appropriate technique for monitoring, assisting and/or progressing therapeutic exercises/activities.                  Learning Progress Summary            Patient Acceptance, E,TB,D, VU,DU,NR by  at 2/26/2025 1157                      Point: Precautions (Done)       Description:   Instruct learner(s) on prescribed precautions during self-care and functional transfers.                  Learning Progress Summary            Patient Acceptance, E,TB,D, VU,DU,NR by  at 2/26/2025 1157                      Point: Body mechanics (Done)       Description:   Instruct learner(s) on proper positioning and spine alignment during self-care, functional mobility activities and/or exercises.                  Learning Progress Summary            Patient Acceptance, E,TB,D, VU,DU,NR by  at 2/26/2025 1157                                      User Key       Initials Effective Dates Name Provider Type [facility] 06/16/21 -  Sheree Banks OT Occupational Therapist OT                  OT Recommendation and Plan  Therapy Frequency (OT): 5  times/wk  Plan of Care Review  Plan of Care Reviewed With: patient  Progress: improving  Outcome Evaluation: Pt is an 85 y/o male ~4 wks 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 He's now POD 1 left polyethylene insert exchange and washout. WBAT LLE. Pt lives alone in a H with 1 small SUZANNE. He needed assistance initially after his revision but had since progressed to being independent with all mobility, including driving himself to/from outpatient therapy. Pt had a fall on 2/23/25. He does not have 24/7 care/assist available. Per family report pt is usually alert and oriented with no confusion. He presents today anxious and mildly confused. He requires modA x2 for sit to stand transfers, bed to BSC, and BSC to recliner with RW. Pt is unable to tolerate WB on LLE and with significant difficulty advancing the LLE. He is also unable to advance with walker during pivot turns. Max cues are needed for sequencing and max reassurance provided for all OOB activity. Pt is a very high fall risk and is not safe to DC home at alone at this time. OT recommends SNF.     Time Calculation:         Time Calculation- OT       Row Name 02/26/25 1200             Time Calculation-     OT Start Time 1006  -      OT Stop Time 1038  -      OT Time Calculation (min) 32 min  -      Total Timed Code Minutes- OT 15 minute(s)  -      OT Received On 02/26/25  -      OT - Next Appointment 02/27/25  -      OT Goal Re-Cert Due Date 03/12/25  -                User Key  (r) = Recorded By, (t) = Taken By, (c) = Cosigned By      Initials Name Provider Type    Sheree Jiménez OT Occupational Therapist                  Therapy Charges for Today       Code Description Service Date Service Provider Modifiers Qty    [id number]  OT SELF  CARE/MGMT/TRAIN EA 15 MIN 2/26/2025 Sheree Banks OT GO 1    83305953423 HC OT EVAL MOD COMPLEXITY 3 2/26/2025 Sheree Banks OT GO 1                 Sheree Banks OT  2/26/2025

## 2025-02-26 NOTE — PLAN OF CARE
Goal Outcome Evaluation:  Plan of Care Reviewed With: patient           Outcome Evaluation: Pt is an 83 y/o male ~4 wks 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 He's now POD 1 left polyethylene insert exchange and washout. WBAT LLE. Pt lives alone in a H with 1 small SUZANNE. He needed assistance initially after his revision but had since progressed to being independent with all mobility, including driving himself to/from outpatient therapy. Pt had a fall on 2/23/25. He does not have 24/7 care/assist available. Per family report pt is usually alert and oriented with no confusion. He presents today anxious and mildly confused. He requires modA x2 for sit to stand transfers, bed to BSC, and BSC to recliner with RW. Pt is unable to tolerate WB on LLE and with significant difficulty advancing the LLE. He is also unable to advance with walker during pivot turns. Max cues are needed for sequencing and max reassurance provided for all OOB activity. Pt is a very high fall risk and is not safe to DC home at alone at this time. PT recommends SNF. PT will follow.    Anticipated Discharge Disposition (PT): skilled nursing facility

## 2025-02-26 NOTE — PROGRESS NOTES
Infectious Diseases Progress Note      LOS: 2 days   Patient Care Team:  Rolf Harvey MD as PCP - General (Family Medicine)  Cedric Jacobs MD as Consulting Physician (Nephrology)    Chief Complaint: Left knee pain and swelling, rash    Subjective       The patient has been afebrile for the last 24 hours.  The patient is on room air, hemodynamically stable, and is tolerating antimicrobial therapy.  Patient mentions today a rash on his back that he has had for approximately a week      Review of Systems:   Review of Systems   Constitutional: Negative.    HENT: Negative.     Eyes: Negative.    Respiratory: Negative.     Cardiovascular: Negative.    Gastrointestinal: Negative.    Endocrine: Negative.    Genitourinary: Negative.    Musculoskeletal:  Positive for joint swelling.   Skin:  Positive for rash.   Neurological: Negative.    Psychiatric/Behavioral: Negative.     All other systems reviewed and are negative.       Objective     Vital Signs  Temp:  [98.2 °F (36.8 °C)-98.4 °F (36.9 °C)] 98.2 °F (36.8 °C)  Heart Rate:  [64-70] 70  Resp:  [11-15] 12  BP: (111-139)/(53-69) 111/63    Physical Exam:  Physical Exam  Vitals and nursing note reviewed.   Constitutional:       General: He is not in acute distress.     Appearance: Normal appearance. He is well-developed and normal weight. He is not diaphoretic.   HENT:      Head: Normocephalic and atraumatic.   Eyes:      Conjunctiva/sclera: Conjunctivae normal.      Pupils: Pupils are equal, round, and reactive to light.   Cardiovascular:      Rate and Rhythm: Normal rate and regular rhythm.      Heart sounds: Normal heart sounds, S1 normal and S2 normal.   Pulmonary:      Effort: Pulmonary effort is normal. No respiratory distress.      Breath sounds: Normal breath sounds. No stridor. No wheezing or rales.   Abdominal:      General: Bowel sounds are normal. There is no distension.      Palpations: Abdomen is soft. There is no mass.      Tenderness: There is no  abdominal tenderness. There is no guarding.   Musculoskeletal:         General: No deformity. Normal range of motion.      Cervical back: Neck supple.      Comments: Left knee with dressing in place and Hemovac with bloody drainage    Skin:     General: Skin is warm and dry.      Coloration: Skin is not pale.      Findings: Rash present. No erythema.      Comments: Papular rash throughout the patient's back but does not appear to be on any other part of his body   Neurological:      Mental Status: He is alert and oriented to person, place, and time.      Cranial Nerves: No cranial nerve deficit.   Psychiatric:         Mood and Affect: Mood normal.          Results Review:    I have reviewed all clinical data, test, lab, and imaging results.     Radiology  No Radiology Exams Resulted Within Past 24 Hours    Cardiology    Laboratory    Results from last 7 days   Lab Units 02/26/25  0401 02/25/25  0012 02/24/25  0730 02/24/25  0723   WBC 10*3/mm3 9.24 6.92  --  8.51   HEMOGLOBIN g/dL 11.7* 12.8*  --  14.1   HEMOGLOBIN, POC g/dL  --   --  14.3  --    HEMATOCRIT % 35.8* 38.9  --  41.9   HEMATOCRIT POC %  --   --  42  --    PLATELETS 10*3/mm3 162 173  --  184     Results from last 7 days   Lab Units 02/26/25  0401 02/25/25  1144 02/25/25  0012 02/24/25  0730 02/24/25  0723   SODIUM mmol/L 138  --  136  --  137   POTASSIUM mmol/L 4.0  --  4.1  --  3.5   CHLORIDE mmol/L 106  --  104  --  100   CO2 mmol/L 22.5  --  22.9  --  24.7   BUN mg/dL 32*  --  41*  --  37*   CREATININE mg/dL 1.21  --  1.68* 2.10* 1.96*   GLUCOSE mg/dL 117*  --  119*  --  136*   ALBUMIN g/dL 3.1*  --  3.2*  3.2*  --  3.7   BILIRUBIN mg/dL  --   --  0.8  --  1.2   ALK PHOS U/L  --   --  57  --  69   AST (SGOT) U/L  --   --  86*  --  138*   ALT (SGPT) U/L  --   --  99*  --  131*   AMMONIA umol/L  --  34  --   --   --    CALCIUM mg/dL 8.0*  --  7.9*  --  9.0         Results from last 7 days   Lab Units 02/24/25  0723   SED RATE mm/hr 20          Microbiology   Microbiology Results (last 10 days)       Procedure Component Value - Date/Time    Tissue / Bone Culture - Tissue, Knee, Left [[id number]] Collected: 02/25/25 0743    Lab Status: Preliminary result Specimen: Tissue from Knee, Left Updated: 02/26/25 0905     Tissue Culture Growth present, too young to evaluate     Gram Stain Many (4+) WBCs per low power field      Rare (1+) Gram negative bacilli    Tissue / Bone Culture - Tissue, Knee, Left [[id number]] Collected: 02/25/25 0741    Lab Status: Preliminary result Specimen: Tissue from Knee, Left Updated: 02/26/25 0931     Tissue Culture Growth present, too young to evaluate     Gram Stain Many (4+) WBCs per low power field      Rare (1+) Gram negative bacilli    Wound Culture - Surgical Site, Knee, Left [[id number]] Collected: 02/25/25 0737    Lab Status: Preliminary result Specimen: Surgical Site from Knee, Left Updated: 02/26/25 1001     Wound Culture Growth present, too young to evaluate     Gram Stain Many (4+) WBCs per low power field      No organisms seen    MRSA Screen, PCR (Inpatient) - Swab, Nares [[id number]]  (Normal) Collected: 02/24/25 1407    Lab Status: Final result Specimen: Swab from Nares Updated: 02/24/25 1615     MRSA PCR No MRSA Detected    Narrative:      The negative predictive value of this diagnostic test is high and should only be used to consider de-escalating anti-MRSA therapy. A positive result may indicate colonization with MRSA and must be correlated clinically.    COVID-19 and FLU A/B PCR, 1 HR TAT - Swab, Nasopharynx [[id number]]  (Normal) Collected: 02/24/25 1128    Lab Status: Final result Specimen: Swab from Nasopharynx Updated: 02/24/25 1154     COVID19 Not Detected     Influenza A PCR Not Detected     Influenza B PCR Not Detected    Narrative:      Fact sheet for providers: https://www.fda.gov/media/888324/download    Fact sheet for patients: https://www.fda.gov/media/092392/download    Test performed by PCR.     Body Fluid Culture - Synovial Fluid, Knee, Left [[id number]] Collected: 02/24/25 1000    Lab Status: Preliminary result Specimen: Synovial Fluid from Knee, Left Updated: 02/26/25 0741     Body Fluid Culture No growth at 2 days     Gram Stain Many (4+) WBCs per low power field      No organisms seen    Blood Culture - Blood, Arm, Left [[id number]]  (Normal) Collected: 02/24/25 0820    Lab Status: Preliminary result Specimen: Blood from Arm, Left Updated: 02/26/25 0830     Blood Culture No growth at 2 days    Narrative:      Less than seven (7) mL's of blood was collected.  Insufficient quantity may yield false negative results.    Blood Culture - Blood, Arm, Left [[id number]]  (Normal) Collected: 02/24/25 0723    Lab Status: Preliminary result Specimen: Blood from Arm, Left Updated: 02/26/25 0730     Blood Culture No growth at 2 days            Medication Review:       Schedule Meds  [Held by provider] aspirin, 81 mg, Oral, BID  cefTAZidime, 2,000 mg, Intravenous, Q12H  dicyclomine, 10 mg, Oral, TID  [Held by provider] gabapentin, 300 mg, Oral, TID  sodium chloride, 10 mL, Intravenous, Q12H  tamsulosin, 0.4 mg, Oral, Q PM        Infusion Meds       PRN Meds    acetaminophen **OR** acetaminophen **OR** acetaminophen    senna-docusate sodium **AND** polyethylene glycol **AND** bisacodyl **AND** bisacodyl    Calcium Replacement - Follow Nurse / BPA Driven Protocol    hydrALAZINE    HYDROcodone-acetaminophen    HYDROmorphone    hydrOXYzine    Magnesium Standard Dose Replacement - Follow Nurse / BPA Driven Protocol    melatonin    nitroglycerin    ondansetron ODT **OR** ondansetron    Phosphorus Replacement - Follow Nurse / BPA Driven Protocol    Potassium Replacement - Follow Nurse / BPA Driven Protocol    sodium chloride    sodium chloride        Assessment & Plan       Antimicrobial Therapy   1.  IV ceftazidime        2.        3.        4.        5.            Assessment     Left total knee infection.  Patient status  post aspiration in the ED that showed over 93,000 total nucleated cells and purulence was seen both at the prepatellar bursa and deeper into the knee during an incision and drainage and liner exchange on 2/25/2025.  Waiting on interoperative cultures and Gram stain was positive for gram-negative bacilli     History of total left knee replacement in 2008.  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  Does not appear to be infectious in nature     Plan     Continue IV ceftazidime 2 g every 12 hours  Waiting on interoperative cultures  Patient will need 6 weeks of IV antibiotics  Continue supportive care  A.m. labs  Case discussed with primary NP    The above note was transcribed for Dr. Llanos-physical exam and review of systems were performed by him    Cici Payton, APRN  02/26/25  17:38 EST    Note is dictated utilizing voice recognition software/Dragon

## 2025-02-26 NOTE — CASE MANAGEMENT/SOCIAL WORK
Continued Stay Note   Carlito     Patient Name: Juan Marley  MRN: [id number]  Today's Date: 2/26/2025    Admit Date: 2/24/2025    Plan: From home alone.  Will likely need snf.  Per ID will need 6 weeks IV antibiotics.  Pending snf choices from family.   Discharge Plan       Row Name 02/26/25 1325       Plan    Plan From home alone.  Will likely need snf.  Per ID will need 6 weeks IV antibiotics.  Pending snf choices from family.    Plan Comments Dc barriers: pod#1 left knee polyethylene insert exchange and washout. , IV antibiotics, ( per ID will need 6 weeks of IV antibiotics) pending intra operative cultures.  PT OT recommend snf. Pending choices from family. Will require precert and will need PASRR                         Expected Discharge Date and Time       Expected Discharge Date Expected Discharge Time    Feb 28, 2025           Susy Cardenas RN    [facility]  [email or web address]  Office [phone]  Cell [phone]      Nida Cardenas RN

## 2025-02-26 NOTE — NURSING NOTE
Patients phos was 1.9. Nephrology called. Nephrologist asked if patient had been eating. Stated that it wasn't too bad and that he would see patient in the morning. Will tell Day shift nurse.

## 2025-02-26 NOTE — PLAN OF CARE
Problem: Adult Inpatient Plan of Care  Goal: Plan of Care Review  Recent Flowsheet Documentation  Taken 2/26/2025 1159 by Sheree Banks OT  Outcome Evaluation: Pt is an 85 y/o male ~4 wks 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 He's now POD 1 left polyethylene insert exchange and washout. WBAT LLE. Pt lives alone in a H with 1 small SUZANNE. He needed assistance initially after his revision but had since progressed to being independent with all mobility, including driving himself to/from outpatient therapy. Pt had a fall on 2/23/25. He does not have 24/7 care/assist available. Per family report pt is usually alert and oriented with no confusion. He presents today anxious and mildly confused. He requires modA x2 for sit to stand transfers, bed to BSC, and BSC to recliner with RW. Pt is unable to tolerate WB on LLE and with significant difficulty advancing the LLE. He is also unable to advance with walker during pivot turns. Max cues are needed for sequencing and max reassurance provided for all OOB activity. Pt is a very high fall risk and is not safe to DC home at alone at this time. OT recommends SNF.  Taken 2/26/2025 1130 by Sheree Banks OT  Progress: improving   Goal Outcome Evaluation:  Plan of Care Reviewed With: patient        Progress: improving       Anticipated Discharge Disposition (OT): skilled nursing facility

## 2025-02-26 NOTE — PLAN OF CARE
Goal Outcome Evaluation:                            Pt disoriented to time and place. Pt able to make needs known. Fall precautions maintained. Hemovac in place. Pt phosphorus 1.7, nephrologist notified and per nephrology phos replacement already ordered and will recheck in AM. Call light within reach. Plan of care ongoing.

## 2025-02-26 NOTE — PROGRESS NOTES
"NEPHROLOGY PROGRESS NOTE------[facility]    [facility]  [phone]  Cedric Jacobs MD      Patient Care Team:  Rolf Harvey MD as PCP - General (Family Medicine)  Cedric Jacobs MD as Consulting Physician (Nephrology)      Provider:  Cedric Jacobs MD  Patient Name: Juan Marley  :  1940    SUBJECTIVE:  F/U NATALIO/CKD  No chest pain or SOA    Medication:  [Held by provider] aspirin, 81 mg, Oral, BID  cefTAZidime, 2,000 mg, Intravenous, Q12H  dicyclomine, 10 mg, Oral, TID  [Held by provider] gabapentin, 300 mg, Oral, TID  potassium & sodium phosphates, 1 packet, Oral, TID AC  sodium chloride, 10 mL, Intravenous, Q12H  tamsulosin, 0.4 mg, Oral, Q PM      sodium chloride, 100 mL/hr, Last Rate: 100 mL/hr (25)        OBJECTIVE    Vital Sign Min/Max for last 24 hours  Temp  Min: 98.2 °F (36.8 °C)  Max: 98.4 °F (36.9 °C)   BP  Min: 121/53  Max: 139/63   Pulse  Min: 64  Max: 70   Resp  Min: 11  Max: 15   SpO2  Min: 97 %  Max: 99 %   No data recorded   No data recorded     Flowsheet Rows      Flowsheet Row First Filed Value   Admission Height 177.8 cm (70\") Documented at 2025 0617   Admission Weight 87 kg (191 lb 12.8 oz) Documented at 2025 0617            I/O this shift:  In: -   Out: 300 [Urine:300]  I/O last 3 completed shifts:  In: 1760 [P.O.:960; I.V.:800]  Out: 2605 [Urine:2425; Drains:80; Blood:100]    Physical Exam:  General Appearance: alert, appears stated age and cooperative  Head: normocephalic, without obvious abnormality and atraumatic  Eyes: conjunctivae and sclerae normal and no icterus  Neck: supple and no JVD  Lungs: clear to auscultation and respirations regular  Heart: regular rhythm & normal rate and normal S1, S2  Chest: Wall no abnormalities observed  Abdomen: normal bowel sounds and soft, nontender  Extremities: moves extremities well, no edema, no cyanosis and no redness  Skin: no bleeding, bruising or " "rash, turgor normal, color normal and no lesions noted  Neurologic: Alert, and oriented. No focal deficits    Labs:    WBC WBC   Date Value Ref Range Status   02/26/2025 9.24 3.40 - 10.80 10*3/mm3 Final   02/25/2025 6.92 3.40 - 10.80 10*3/mm3 Final   02/24/2025 8.51 3.40 - 10.80 10*3/mm3 Final      HGB Hemoglobin   Date Value Ref Range Status   02/26/2025 11.7 (L) 13.0 - 17.7 g/dL Final   02/25/2025 12.8 (L) 13.0 - 17.7 g/dL Final   02/24/2025 14.3 12.0 - 17.0 g/dL Final   02/24/2025 14.1 13.0 - 17.7 g/dL Final      HCT Hematocrit   Date Value Ref Range Status   02/26/2025 35.8 (L) 37.5 - 51.0 % Final   02/25/2025 38.9 37.5 - 51.0 % Final   02/24/2025 42 38 - 51 % Final   02/24/2025 41.9 37.5 - 51.0 % Final      Platelets No results found for: \"LABPLAT\"   MCV MCV   Date Value Ref Range Status   02/26/2025 93.7 79.0 - 97.0 fL Final   02/25/2025 92.6 79.0 - 97.0 fL Final   02/24/2025 91.1 79.0 - 97.0 fL Final          Sodium Sodium   Date Value Ref Range Status   02/26/2025 138 136 - 145 mmol/L Final   02/25/2025 136 136 - 145 mmol/L Final   02/24/2025 137 136 - 145 mmol/L Final      Potassium Potassium   Date Value Ref Range Status   02/26/2025 4.0 3.5 - 5.2 mmol/L Final   02/25/2025 4.1 3.5 - 5.2 mmol/L Final   02/24/2025 3.5 3.5 - 5.2 mmol/L Final      Chloride Chloride   Date Value Ref Range Status   02/26/2025 106 98 - 107 mmol/L Final   02/25/2025 104 98 - 107 mmol/L Final   02/24/2025 100 98 - 107 mmol/L Final      CO2 CO2   Date Value Ref Range Status   02/26/2025 22.5 22.0 - 29.0 mmol/L Final   02/25/2025 22.9 22.0 - 29.0 mmol/L Final   02/24/2025 24.7 22.0 - 29.0 mmol/L Final      BUN BUN   Date Value Ref Range Status   02/26/2025 32 (H) 8 - 23 mg/dL Final   02/25/2025 41 (H) 8 - 23 mg/dL Final   02/24/2025 37 (H) 8 - 23 mg/dL Final      Creatinine Creatinine   Date Value Ref Range Status   02/26/2025 1.21 0.76 - 1.27 mg/dL Final   02/25/2025 1.68 (H) 0.76 - 1.27 mg/dL Final   02/24/2025 2.10 (H) 0.60 - 1.30 " "mg/dL Final   02/24/2025 1.96 (H) 0.76 - 1.27 mg/dL Final      Calcium Calcium   Date Value Ref Range Status   02/26/2025 8.0 (L) 8.6 - 10.5 mg/dL Final   02/25/2025 7.9 (L) 8.6 - 10.5 mg/dL Final   02/24/2025 9.0 8.6 - 10.5 mg/dL Final      PO4 No components found for: \"PO4\"   Albumin Albumin   Date Value Ref Range Status   02/26/2025 3.1 (L) 3.5 - 5.2 g/dL Final   02/25/2025 3.2 (L) 3.5 - 5.2 g/dL Final   02/25/2025 3.2 (L) 3.5 - 5.2 g/dL Final   02/24/2025 3.7 3.5 - 5.2 g/dL Final      Magnesium Magnesium   Date Value Ref Range Status   02/25/2025 2.4 1.6 - 2.4 mg/dL Final      Uric Acid No components found for: \"URIC ACID\"     Imaging Results (Last 72 Hours)       Procedure Component Value Units Date/Time    CT Head Without Contrast [[id number]] Collected: 02/25/25 1233     Updated: 02/25/25 1237    Narrative:      CT HEAD WO CONTRAST    Date of Exam: 2/25/2025 12:22 PM EST    Indication: confusion.    Comparison: None available.    Technique: Axial CT images were obtained of the head without contrast administration.  Coronal reconstructions were performed.  Automated exposure control and iterative reconstruction methods were used.      Findings:  Negative for large territory infarct, acute intracranial hemorrhage, large mass lesion, hydrocephalus or midline shift. There is mild global parenchymal volume loss for age. Minimal periventricular hypodensity most commonly reflecting sequelae of chronic   microvascular ischemic change. Vascular calcifications. No large extra-axial fluid collection. Leftward deviated septum and degenerative spurring. No obstructive sinus disease or layering fluid. No large mastoid effusion. Calvarium grossly unremarkable.      Impression:      Impression:  No acute intracranial findings by CT.    Mild global parenchymal volume loss and presumed sequela of chronic microvascular ischemic change.      Electronically Signed: Erick Gilmore MD    2/25/2025 12:35 PM EST    Workstation ID: " [id number]    US Liver [[id number]] Collected: 02/25/25 1229     Updated: 02/25/25 1233    Narrative:      US LIVER    Date of Exam: 2/25/2025 12:11 PM EST    Indication: abnormal lft's, confusion.    Comparison: No comparisons available.    Technique: Grayscale and color Doppler ultrasound evaluation of the right upper quadrant was performed.      Findings:  Liver has homogenous echogenicity and normal echotexture.  No focal hepatic lesion. Hepatopetal flow of the main portal vein. No visualized ascites.    Numerous echogenic shadowing gallstones. Gallbladder is distended without significant wall thickening or pericholecystic fluid.    Mild intrahepatic duct dilation. Dilated extrahepatic biliary tree, common bile duct measures up to 1.3 cm abnormal for age.    The visualized portions of the head and body of the pancreas are grossly unremarkable. The pancreatic tail is not seen due to bowel gas.    Homogeneous cortical echotexture of the right kidney. No hydronephrosis, shadowing echogenicities or solid masses.          Impression:      1. Cholelithiasis. Distended gallbladder without wall thickening or pericholecystic fluid. Correlate for clinical signs of acute cholecystitis.  2. Dilated biliary tree for age. If there is concern for choledocholithiasis or other downstream obstruction recommend MRCP.      Electronically Signed: Erick Gilmore MD    2/25/2025 12:31 PM EST    Workstation ID: [id number]    XR Knee 1 or 2 View Left [[id number]] Collected: 02/25/25 0934     Updated: 02/25/25 0938    Narrative:      XR KNEE 1 OR 2 VW LEFT    Date of Exam: 2/25/2025 9:26 AM EST    Indication: post op    Comparison: Left knee radiograph 2/24/2025    Findings:  Total knee arthroplasty in anatomic alignment. Small less conspicuous joint effusion as well as decreasing soft tissue edema most significant along the anterior aspect of the knee joint. No evidence of loosening, hardware fracture or periprosthetic   fracture. Surgical  drain noted.      Impression:      Impression:  Decreasing soft tissue swelling and joint fluid. Radiographically uncomplicated total arthroplasty in anatomic alignment.      Electronically Signed: Erick Gilmore MD    2/25/2025 9:36 AM EST    Workstation ID: [id number]    XR Chest 1 View [[id number]] Collected: 02/25/25 0701     Updated: 02/25/25 0704    Narrative:      XR CHEST 1 VW    Date of Exam: 2/25/2025 5:25 AM EST    Indication: pre op    Comparison: 9/3/2015    Findings:  Heart size normal. Mild left basilar airspace disease favor atelectasis. The right lung is clear. No pneumothorax. No significant effusion. Osseous structures appear intact.      Impression:      Impression:  Mild left basilar airspace disease favor atelectasis.          Electronically Signed: Reynold Marks MD    2/25/2025 7:02 AM EST    Workstation ID: [id number]    US Renal Bilateral [[id number]] Collected: 02/24/25 1545     Updated: 02/24/25 1549    Narrative:      US RENAL BILATERAL    Date of Exam: 2/24/2025 3:10 PM EST    Indication: Kidney failure, acute  NATALIO/CKD.    Comparison: No comparisons available.    Technique: Grayscale and color Doppler ultrasound evaluation of the kidneys and urinary bladder was performed.      Findings:  RIGHT kidney measures 11.2 x 5.4 x 5.0 cm. Renal cortical atrophy. There is no solid kidney mass. There is a 2.4 cm cyst in the lower pole the right kidney. No echogenic shadowing stone.  No hydronephrosis.    LEFT kidney measures 12.2 x 4.9 x 4.9 cm. 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 There is no solid kidney mass.  No echogenic shadowing stone.  No hydronephrosis.    Urinary bladder is unremarkable in contour. Volume of 400 cc at time of examination.        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 No acute process/evidence of obstruction.        Electronically Signed: Brett Newton MD    2/24/2025 3:47 PM EST    Workstation ID: [id number]    XR Knee 4+ View Left [[id number]] Collected:  02/24/25 0716     Updated: 02/24/25 0725    Narrative:      XR KNEE 4+ VW LEFT    Date of Exam: 2/24/2025 7:12 AM EST    Indication: pain, redness, swelling s/p TKR revision    Comparison: January 17, 2025, January 29, 2025    Findings:  Status post knee arthroplasty. Hardware components appear in stable and expected positions. No definite acute hardware complication.    There appears to be a small to moderate knee effusion. There is anterior soft tissue prominence overlying the patella and extensor tendons. There also appears to be diffuse subcutaneous edema. No definite acute osseous abnormality.      Impression:      Impression:  1.Status post knee arthroplasty.  No definite acute hardware or osseous complication.  2.Nonspecific small to moderate knee effusion, subcutaneous edema, and anterior soft tissue prominence. Joint or soft tissue infection should be considered based upon patient's reported symptoms. Correlate with clinical findings, lab values, and consider   joint aspiration.          Electronically Signed: Adrien Alvarenga    2/24/2025 7:23 AM EST    Workstation ID: [id number]            Results for orders placed during the hospital encounter of 02/24/25    XR Knee 1 or 2 View Left    Narrative  XR KNEE 1 OR 2 VW LEFT    Date of Exam: 2/25/2025 9:26 AM EST    Indication: post op    Comparison: Left knee radiograph 2/24/2025    Findings:  Total knee arthroplasty in anatomic alignment. Small less conspicuous joint effusion as well as decreasing soft tissue edema most significant along the anterior aspect of the knee joint. No evidence of loosening, hardware fracture or periprosthetic  fracture. Surgical drain noted.    Impression  Impression:  Decreasing soft tissue swelling and joint fluid. Radiographically uncomplicated total arthroplasty in anatomic alignment.      Electronically Signed: Erick Gilmore MD  2/25/2025 9:36 AM EST  Workstation ID: [id number]      XR Chest 1 View    Narrative  XR CHEST 1  VW    Date of Exam: 2/25/2025 5:25 AM EST    Indication: pre op    Comparison: 9/3/2015    Findings:  Heart size normal. Mild left basilar airspace disease favor atelectasis. The right lung is clear. No pneumothorax. No significant effusion. Osseous structures appear intact.    Impression  Impression:  Mild left basilar airspace disease favor atelectasis.          Electronically Signed: Reynold Marks MD  2/25/2025 7:02 AM EST  Workstation ID: [id number]      XR Knee 4+ View Left    Narrative  XR KNEE 4+ VW LEFT    Date of Exam: 2/24/2025 7:12 AM EST    Indication: pain, redness, swelling s/p TKR revision    Comparison: January 17, 2025, January 29, 2025    Findings:  Status post knee arthroplasty. Hardware components appear in stable and expected positions. No definite acute hardware complication.    There appears to be a small to moderate knee effusion. There is anterior soft tissue prominence overlying the patella and extensor tendons. There also appears to be diffuse subcutaneous edema. No definite acute osseous abnormality.    Impression  Impression:  1.Status post knee arthroplasty.  No definite acute hardware or osseous complication.  2.Nonspecific small to moderate knee effusion, subcutaneous edema, and anterior soft tissue prominence. Joint or soft tissue infection should be considered based upon patient's reported symptoms. Correlate with clinical findings, lab values, and consider  joint aspiration.          Electronically Signed: Adrien Alvarenga  2/24/2025 7:23 AM EST  Workstation ID: [id number]      Results for orders placed during the hospital encounter of 02/24/25    Duplex Venous Lower Extremity - Left CAR    Interpretation Summary    Normal left lower extremity venous duplex scan.        ASSESSMENT / PLAN      Pyogenic arthritis of left knee joint    S/P revision of total knee, left    Knee pain    Essential tremor    Essential hypertension    Dehydration    NATALIO (acute kidney injury)    Fever    Generalized  weakness    Confusion    Abnormal liver function test    NATALIO-likely prerenal in setting of hypotension/diuretic and or ACE inhibitor use and with decreased p.o. 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

## 2025-02-27 LAB
25(OH)D3 SERPL-MCNC: 12.9 NG/ML (ref 30–100)
ALBUMIN SERPL-MCNC: 2.7 G/DL (ref 3.5–5.2)
ALBUMIN SERPL-MCNC: 3 G/DL (ref 3.5–5.2)
ANION GAP SERPL CALCULATED.3IONS-SCNC: 10.4 MMOL/L (ref 5–15)
ANION GAP SERPL CALCULATED.3IONS-SCNC: 9.4 MMOL/L (ref 5–15)
BACTERIA FLD CULT: ABNORMAL
BASOPHILS # BLD AUTO: 0.02 10*3/MM3 (ref 0–0.2)
BASOPHILS NFR BLD AUTO: 0.3 % (ref 0–1.5)
BUN SERPL-MCNC: 17 MG/DL (ref 8–23)
BUN SERPL-MCNC: 22 MG/DL (ref 8–23)
BUN/CREAT SERPL: 17.5 (ref 7–25)
BUN/CREAT SERPL: 18.3 (ref 7–25)
CALCIUM SPEC-SCNC: 7.9 MG/DL (ref 8.6–10.5)
CALCIUM SPEC-SCNC: 8 MG/DL (ref 8.6–10.5)
CHLORIDE SERPL-SCNC: 107 MMOL/L (ref 98–107)
CHLORIDE SERPL-SCNC: 110 MMOL/L (ref 98–107)
CO2 SERPL-SCNC: 20.6 MMOL/L (ref 22–29)
CO2 SERPL-SCNC: 21.6 MMOL/L (ref 22–29)
CREAT SERPL-MCNC: 0.97 MG/DL (ref 0.76–1.27)
CREAT SERPL-MCNC: 1.2 MG/DL (ref 0.76–1.27)
DEPRECATED RDW RBC AUTO: 41.9 FL (ref 37–54)
EGFRCR SERPLBLD CKD-EPI 2021: 60.4 ML/MIN/1.73
EGFRCR SERPLBLD CKD-EPI 2021: 77.9 ML/MIN/1.73
EOSINOPHIL # BLD AUTO: 0.34 10*3/MM3 (ref 0–0.4)
EOSINOPHIL NFR BLD AUTO: 5 % (ref 0.3–6.2)
ERYTHROCYTE [DISTWIDTH] IN BLOOD BY AUTOMATED COUNT: 12.4 % (ref 12.3–15.4)
GLUCOSE SERPL-MCNC: 142 MG/DL (ref 65–99)
GLUCOSE SERPL-MCNC: 160 MG/DL (ref 65–99)
GRAM STN SPEC: ABNORMAL
GRAM STN SPEC: ABNORMAL
HCT VFR BLD AUTO: 33.1 % (ref 37.5–51)
HGB BLD-MCNC: 10.9 G/DL (ref 13–17.7)
IMM GRANULOCYTES # BLD AUTO: 0.04 10*3/MM3 (ref 0–0.05)
IMM GRANULOCYTES NFR BLD AUTO: 0.6 % (ref 0–0.5)
LYMPHOCYTES # BLD AUTO: 0.62 10*3/MM3 (ref 0.7–3.1)
LYMPHOCYTES NFR BLD AUTO: 9 % (ref 19.6–45.3)
MCH RBC QN AUTO: 30.3 PG (ref 26.6–33)
MCHC RBC AUTO-ENTMCNC: 32.9 G/DL (ref 31.5–35.7)
MCV RBC AUTO: 91.9 FL (ref 79–97)
MONOCYTES # BLD AUTO: 0.53 10*3/MM3 (ref 0.1–0.9)
MONOCYTES NFR BLD AUTO: 7.7 % (ref 5–12)
NEUTROPHILS NFR BLD AUTO: 5.31 10*3/MM3 (ref 1.7–7)
NEUTROPHILS NFR BLD AUTO: 77.4 % (ref 42.7–76)
NRBC BLD AUTO-RTO: 0 /100 WBC (ref 0–0.2)
PHOSPHATE SERPL-MCNC: 2 MG/DL (ref 2.5–4.5)
PHOSPHATE SERPL-MCNC: 2.5 MG/DL (ref 2.5–4.5)
PLATELET # BLD AUTO: 248 10*3/MM3 (ref 140–450)
PMV BLD AUTO: 10.7 FL (ref 6–12)
POTASSIUM SERPL-SCNC: 3.9 MMOL/L (ref 3.5–5.2)
POTASSIUM SERPL-SCNC: 4.2 MMOL/L (ref 3.5–5.2)
RBC # BLD AUTO: 3.6 10*6/MM3 (ref 4.14–5.8)
SODIUM SERPL-SCNC: 137 MMOL/L (ref 136–145)
SODIUM SERPL-SCNC: 142 MMOL/L (ref 136–145)
WBC NRBC COR # BLD AUTO: 6.86 10*3/MM3 (ref 3.4–10.8)

## 2025-02-27 PROCEDURE — 97535 SELF CARE MNGMENT TRAINING: CPT

## 2025-02-27 PROCEDURE — 25010000002 CEFTAZIDIME 2 G RECONSTITUTED SOLUTION 1 EACH VIAL: Performed by: NURSE PRACTITIONER

## 2025-02-27 PROCEDURE — 97530 THERAPEUTIC ACTIVITIES: CPT

## 2025-02-27 PROCEDURE — 25010000002 HYDRALAZINE PER 20 MG: Performed by: ORTHOPAEDIC SURGERY

## 2025-02-27 PROCEDURE — 97110 THERAPEUTIC EXERCISES: CPT

## 2025-02-27 PROCEDURE — 80069 RENAL FUNCTION PANEL: CPT | Performed by: ORTHOPAEDIC SURGERY

## 2025-02-27 PROCEDURE — 85025 COMPLETE CBC W/AUTO DIFF WBC: CPT | Performed by: NURSE PRACTITIONER

## 2025-02-27 PROCEDURE — 25010000002 CEFTRIAXONE PER 250 MG: Performed by: INTERNAL MEDICINE

## 2025-02-27 PROCEDURE — 97116 GAIT TRAINING THERAPY: CPT

## 2025-02-27 PROCEDURE — 97112 NEUROMUSCULAR REEDUCATION: CPT

## 2025-02-27 RX ORDER — DIPHENHYDRAMINE HYDROCHLORIDE, ZINC ACETATE 2; .1 G/100G; G/100G
1 CREAM TOPICAL 3 TIMES DAILY PRN
Status: DISCONTINUED | OUTPATIENT
Start: 2025-02-27 | End: 2025-03-05 | Stop reason: HOSPADM

## 2025-02-27 RX ADMIN — DICYCLOMINE HYDROCHLORIDE 10 MG: 10 CAPSULE ORAL at 20:13

## 2025-02-27 RX ADMIN — HYDROCODONE BITARTRATE AND ACETAMINOPHEN 1 TABLET: 7.5; 325 TABLET ORAL at 04:37

## 2025-02-27 RX ADMIN — DICYCLOMINE HYDROCHLORIDE 10 MG: 10 CAPSULE ORAL at 08:10

## 2025-02-27 RX ADMIN — Medication 10 ML: at 20:13

## 2025-02-27 RX ADMIN — DICYCLOMINE HYDROCHLORIDE 10 MG: 10 CAPSULE ORAL at 15:40

## 2025-02-27 RX ADMIN — Medication 10 ML: at 08:11

## 2025-02-27 RX ADMIN — HYDRALAZINE HYDROCHLORIDE 10 MG: 20 INJECTION INTRAMUSCULAR; INTRAVENOUS at 04:37

## 2025-02-27 RX ADMIN — HYDROCODONE BITARTRATE AND ACETAMINOPHEN 1 TABLET: 7.5; 325 TABLET ORAL at 10:06

## 2025-02-27 RX ADMIN — CEFTRIAXONE 2000 MG: 2 INJECTION, POWDER, FOR SOLUTION INTRAMUSCULAR; INTRAVENOUS at 15:40

## 2025-02-27 RX ADMIN — TAMSULOSIN HYDROCHLORIDE 0.4 MG: 0.4 CAPSULE ORAL at 18:00

## 2025-02-27 RX ADMIN — CEFTAZIDIME 2000 MG: 2 INJECTION, POWDER, FOR SOLUTION INTRAVENOUS at 05:27

## 2025-02-27 NOTE — THERAPY TREATMENT NOTE
"Subjective: Pt agreeable to therapeutic plan of care.    Objective:     Precautions - LLE WBAT    Bed mobility - Supine to sitting Mod A. Cueing on task segmentation.     Transfers - STS From elevated EOB Min A using RW.   STS from BSC Mod A using RW. Cued on positioning.     Ambulation - 2x3 feet Mod A using RW.     ADLs - Max A for perihygiene while standing.     Neuro - Mod-Max A for upright balance using RW for support.     Therapeutic Exercise - 10 Reps L Lower Extremity AAROM supported sitting / chair    Vitals: WNL    Pain: 5 VAS Location: L knee  Intervention for pain: Repositioned, RN provided medication, Increased Activity, and Therapeutic Presence    Education: Provided education on the importance of mobility in the acute care setting, Verbal/Tactile Cues, Transfer Training, and Gait Training    Assessment: Juan Marley presents with functional mobility impairments which indicate the need for skilled intervention. Tolerating session today without incident. Pt with poor weight bearing tolerance through LLE while upright, requiring significant amount of assistance for upright balance and to transfer between EOB, BSC, and recliner chair. Recommending SNF at discharge. Will continue to follow and progress as tolerated.     Plan/Recommendations:   If medically appropriate, Moderate Intensity Therapy recommended post-acute care. This is recommended as therapy feels the patient would require 3-4 days per week and wouldn't tolerate \"3 hour daily\" rehab intensity. SNF would be the preferred choice. If the patient does not agree to SNF, arrange HH or OP depending on home bound status. If patient is medically complex, consider LTACH. Pt requires no DME at discharge.     Pt desires Skilled Rehab placement at discharge. Pt cooperative; agreeable to therapeutic recommendations and plan of care.         Basic Mobility 6-click:  Rollin = Total, A lot = 2, A little = 3; 4 = None  Supine>Sit:   1 = Total, A lot " = 2, A little = 3; 4 = None   Sit>Stand with arms:  1 = Total, A lot = 2, A little = 3; 4 = None  Bed>Chair:   1 = Total, A lot = 2, A little = 3; 4 = None  Ambulate in room:  1 = Total, A lot = 2, A little = 3; 4 = None  3-5 Steps with railin = Total, A lot = 2, A little = 3; 4 = None  Score: 10    Modified Abundio: N/A = No pre-op stroke/TIA    Post-Tx Position: Up in Chair, Alarms activated, and Call light and personal items within reach  PPE: gloves    Therapy Charges for Today       Code Description Service Date Service Provider Modifiers Qty    50527205778 HC PT THERAPEUTIC ACT EA 15 MIN 2025 Feroz Francois, ELEANOR GP 1    68614804038 HC GAIT TRAINING EA 15 MIN 2025 Feroz Francois, ELEANOR GP 1    15007866566 HC PT NEUROMUSC RE EDUCATION EA 15 MIN 2025 Feroz Francois PTA GP 1    87943911656 HC PT SELF CARE/MGMT/TRAIN EA 15 MIN 2025 Feroz Francois PTA GP 1    02052871129 HC PT THER PROC EA 15 MIN 2025 Feroz Francois PTA GP 1           PT Charges       Row Name 25 1149             Time Calculation    Start Time 1017  -UN      Stop Time 1101  -UN      Time Calculation (min) 44 min  -UN      PT Received On 25  -UN      PT - Next Appointment 25  -UN         Time Calculation- PT    Total Timed Code Minutes- PT 44 minute(s)  -UN                User Key  (r) = Recorded By, (t) = Taken By, (c) = Cosigned By      Initials Name Provider Type    Feroz Vanegas PTA Physical Therapist Assistant

## 2025-02-27 NOTE — PROGRESS NOTES
"NEPHROLOGY PROGRESS NOTE------[facility]    [facility]  [phone]  Cedric Jacobs MD      Patient Care Team:  Rolf Harvey MD as PCP - General (Family Medicine)  Cedric Jacobs MD as Consulting Physician (Nephrology)      Provider:  Cedric Jacobs MD  Patient Name: Juan Marley  :  1940    SUBJECTIVE:  F/U NATALIO/CKD  No chest pain or SOA    Medication:  [Held by provider] aspirin, 81 mg, Oral, BID  cefTRIAXone, 2,000 mg, Intravenous, Q24H  dicyclomine, 10 mg, Oral, TID  [Held by provider] gabapentin, 300 mg, Oral, TID  sodium chloride, 10 mL, Intravenous, Q12H  tamsulosin, 0.4 mg, Oral, Q PM             OBJECTIVE    Vital Sign Min/Max for last 24 hours  Temp  Min: 98.1 °F (36.7 °C)  Max: 98.8 °F (37.1 °C)   BP  Min: 111/63  Max: 160/67   Pulse  Min: 66  Max: 73   Resp  Min: 12  Max: 18   SpO2  Min: 93 %  Max: 98 %   No data recorded   No data recorded     Flowsheet Rows      Flowsheet Row First Filed Value   Admission Height 177.8 cm (70\") Documented at 2025 0617   Admission Weight 87 kg (191 lb 12.8 oz) Documented at 2025 0617            No intake/output data recorded.  I/O last 3 completed shifts:  In: 1320 [P.O.:1320]  Out: 1625 [Urine:1500; Drains:125]    Physical Exam:  General Appearance: alert, appears stated age and cooperative  Head: normocephalic, without obvious abnormality and atraumatic  Eyes: conjunctivae and sclerae normal and no icterus  Neck: supple and no JVD  Lungs: clear to auscultation and respirations regular  Heart: regular rhythm & normal rate and normal S1, S2  Chest: Wall no abnormalities observed  Abdomen: normal bowel sounds and soft, nontender  Extremities: moves extremities well, no edema, no cyanosis and no redness  Skin: no bleeding, bruising or rash, turgor normal, color normal and no lesions noted  Neurologic: Alert, and oriented. No focal deficits    Labs:    WBC WBC   Date Value Ref Range " "Status   02/26/2025 8.39 3.40 - 10.80 10*3/mm3 Final   02/26/2025 9.24 3.40 - 10.80 10*3/mm3 Final   02/25/2025 6.92 3.40 - 10.80 10*3/mm3 Final      HGB Hemoglobin   Date Value Ref Range Status   02/26/2025 10.8 (L) 13.0 - 17.7 g/dL Final   02/26/2025 11.7 (L) 13.0 - 17.7 g/dL Final   02/25/2025 12.8 (L) 13.0 - 17.7 g/dL Final      HCT Hematocrit   Date Value Ref Range Status   02/26/2025 32.6 (L) 37.5 - 51.0 % Final   02/26/2025 35.8 (L) 37.5 - 51.0 % Final   02/25/2025 38.9 37.5 - 51.0 % Final      Platelets No results found for: \"LABPLAT\"   MCV MCV   Date Value Ref Range Status   02/26/2025 91.6 79.0 - 97.0 fL Final   02/26/2025 93.7 79.0 - 97.0 fL Final   02/25/2025 92.6 79.0 - 97.0 fL Final          Sodium Sodium   Date Value Ref Range Status   02/26/2025 137 136 - 145 mmol/L Final   02/26/2025 138 136 - 145 mmol/L Final   02/25/2025 136 136 - 145 mmol/L Final      Potassium Potassium   Date Value Ref Range Status   02/26/2025 4.2 3.5 - 5.2 mmol/L Final   02/26/2025 4.0 3.5 - 5.2 mmol/L Final   02/25/2025 4.1 3.5 - 5.2 mmol/L Final      Chloride Chloride   Date Value Ref Range Status   02/26/2025 107 98 - 107 mmol/L Final   02/26/2025 106 98 - 107 mmol/L Final   02/25/2025 104 98 - 107 mmol/L Final      CO2 CO2   Date Value Ref Range Status   02/26/2025 20.6 (L) 22.0 - 29.0 mmol/L Final   02/26/2025 22.5 22.0 - 29.0 mmol/L Final   02/25/2025 22.9 22.0 - 29.0 mmol/L Final      BUN BUN   Date Value Ref Range Status   02/26/2025 22 8 - 23 mg/dL Final   02/26/2025 32 (H) 8 - 23 mg/dL Final   02/25/2025 41 (H) 8 - 23 mg/dL Final      Creatinine Creatinine   Date Value Ref Range Status   02/26/2025 1.20 0.76 - 1.27 mg/dL Final   02/26/2025 1.21 0.76 - 1.27 mg/dL Final   02/25/2025 1.68 (H) 0.76 - 1.27 mg/dL Final      Calcium Calcium   Date Value Ref Range Status   02/26/2025 8.0 (L) 8.6 - 10.5 mg/dL Final   02/26/2025 8.0 (L) 8.6 - 10.5 mg/dL Final   02/25/2025 7.9 (L) 8.6 - 10.5 mg/dL Final      PO4 No components " "found for: \"PO4\"   Albumin Albumin   Date Value Ref Range Status   02/26/2025 3.0 (L) 3.5 - 5.2 g/dL Final   02/26/2025 3.1 (L) 3.5 - 5.2 g/dL Final   02/25/2025 3.2 (L) 3.5 - 5.2 g/dL Final   02/25/2025 3.2 (L) 3.5 - 5.2 g/dL Final      Magnesium Magnesium   Date Value Ref Range Status   02/25/2025 2.4 1.6 - 2.4 mg/dL Final      Uric Acid No components found for: \"URIC ACID\"     Imaging Results (Last 72 Hours)       Procedure Component Value Units Date/Time    CT Head Without Contrast [[id number]] Collected: 02/25/25 1233     Updated: 02/25/25 1237    Narrative:      CT HEAD WO CONTRAST    Date of Exam: 2/25/2025 12:22 PM EST    Indication: confusion.    Comparison: None available.    Technique: Axial CT images were obtained of the head without contrast administration.  Coronal reconstructions were performed.  Automated exposure control and iterative reconstruction methods were used.      Findings:  Negative for large territory infarct, acute intracranial hemorrhage, large mass lesion, hydrocephalus or midline shift. There is mild global parenchymal volume loss for age. Minimal periventricular hypodensity most commonly reflecting sequelae of chronic   microvascular ischemic change. Vascular calcifications. No large extra-axial fluid collection. Leftward deviated septum and degenerative spurring. No obstructive sinus disease or layering fluid. No large mastoid effusion. Calvarium grossly unremarkable.      Impression:      Impression:  No acute intracranial findings by CT.    Mild global parenchymal volume loss and presumed sequela of chronic microvascular ischemic change.      Electronically Signed: Erick Gilmore MD    2/25/2025 12:35 PM EST    Workstation ID: [id number]    US Liver [[id number]] Collected: 02/25/25 1229     Updated: 02/25/25 1233    Narrative:      US LIVER    Date of Exam: 2/25/2025 12:11 PM EST    Indication: abnormal lft's, confusion.    Comparison: No comparisons available.    Technique: " Grayscale and color Doppler ultrasound evaluation of the right upper quadrant was performed.      Findings:  Liver has homogenous echogenicity and normal echotexture.  No focal hepatic lesion. Hepatopetal flow of the main portal vein. No visualized ascites.    Numerous echogenic shadowing gallstones. Gallbladder is distended without significant wall thickening or pericholecystic fluid.    Mild intrahepatic duct dilation. Dilated extrahepatic biliary tree, common bile duct measures up to 1.3 cm abnormal for age.    The visualized portions of the head and body of the pancreas are grossly unremarkable. The pancreatic tail is not seen due to bowel gas.    Homogeneous cortical echotexture of the right kidney. No hydronephrosis, shadowing echogenicities or solid masses.          Impression:      1. Cholelithiasis. Distended gallbladder without wall thickening or pericholecystic fluid. Correlate for clinical signs of acute cholecystitis.  2. Dilated biliary tree for age. If there is concern for choledocholithiasis or other downstream obstruction recommend MRCP.      Electronically Signed: Erick Gilmore MD    2/25/2025 12:31 PM EST    Workstation ID: [id number]    XR Knee 1 or 2 View Left [[id number]] Collected: 02/25/25 0934     Updated: 02/25/25 0938    Narrative:      XR KNEE 1 OR 2 VW LEFT    Date of Exam: 2/25/2025 9:26 AM EST    Indication: post op    Comparison: Left knee radiograph 2/24/2025    Findings:  Total knee arthroplasty in anatomic alignment. Small less conspicuous joint effusion as well as decreasing soft tissue edema most significant along the anterior aspect of the knee joint. No evidence of loosening, hardware fracture or periprosthetic   fracture. Surgical drain noted.      Impression:      Impression:  Decreasing soft tissue swelling and joint fluid. Radiographically uncomplicated total arthroplasty in anatomic alignment.      Electronically Signed: Erick Gilmore MD    2/25/2025 9:36 AM EST     Workstation ID: [id number]    XR Chest 1 View [[id number]] Collected: 02/25/25 0701     Updated: 02/25/25 0704    Narrative:      XR CHEST 1 VW    Date of Exam: 2/25/2025 5:25 AM EST    Indication: pre op    Comparison: 9/3/2015    Findings:  Heart size normal. Mild left basilar airspace disease favor atelectasis. The right lung is clear. No pneumothorax. No significant effusion. Osseous structures appear intact.      Impression:      Impression:  Mild left basilar airspace disease favor atelectasis.          Electronically Signed: Reynold Marks MD    2/25/2025 7:02 AM EST    Workstation ID: [id number]    US Renal Bilateral [[id number]] Collected: 02/24/25 1545     Updated: 02/24/25 1549    Narrative:      US RENAL BILATERAL    Date of Exam: 2/24/2025 3:10 PM EST    Indication: Kidney failure, acute  NATALIO/CKD.    Comparison: No comparisons available.    Technique: Grayscale and color Doppler ultrasound evaluation of the kidneys and urinary bladder was performed.      Findings:  RIGHT kidney measures 11.2 x 5.4 x 5.0 cm. Renal cortical atrophy. There is no solid kidney mass. There is a 2.4 cm cyst in the lower pole the right kidney. No echogenic shadowing stone.  No hydronephrosis.    LEFT kidney measures 12.2 x 4.9 x 4.9 cm. 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 There is no solid kidney mass.  No echogenic shadowing stone.  No hydronephrosis.    Urinary bladder is unremarkable in contour. Volume of 400 cc at time of examination.        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 No acute process/evidence of obstruction.        Electronically Signed: Brett Newton MD    2/24/2025 3:47 PM EST    Workstation ID: [id number]            Results for orders placed during the hospital encounter of 02/24/25    XR Knee 1 or 2 View Left    Narrative  XR KNEE 1 OR 2 VW LEFT    Date of Exam: 2/25/2025 9:26 AM EST    Indication: post op    Comparison: Left knee radiograph 2/24/2025    Findings:  Total knee arthroplasty in  anatomic alignment. Small less conspicuous joint effusion as well as decreasing soft tissue edema most significant along the anterior aspect of the knee joint. No evidence of loosening, hardware fracture or periprosthetic  fracture. Surgical drain noted.    Impression  Impression:  Decreasing soft tissue swelling and joint fluid. Radiographically uncomplicated total arthroplasty in anatomic alignment.      Electronically Signed: Erick Gilmore MD  2/25/2025 9:36 AM EST  Workstation ID: [id number]      XR Chest 1 View    Narrative  XR CHEST 1 VW    Date of Exam: 2/25/2025 5:25 AM EST    Indication: pre op    Comparison: 9/3/2015    Findings:  Heart size normal. Mild left basilar airspace disease favor atelectasis. The right lung is clear. No pneumothorax. No significant effusion. Osseous structures appear intact.    Impression  Impression:  Mild left basilar airspace disease favor atelectasis.          Electronically Signed: Reynold Marks MD  2/25/2025 7:02 AM EST  Workstation ID: [id number]      XR Knee 4+ View Left    Narrative  XR KNEE 4+ VW LEFT    Date of Exam: 2/24/2025 7:12 AM EST    Indication: pain, redness, swelling s/p TKR revision    Comparison: January 17, 2025, January 29, 2025    Findings:  Status post knee arthroplasty. Hardware components appear in stable and expected positions. No definite acute hardware complication.    There appears to be a small to moderate knee effusion. There is anterior soft tissue prominence overlying the patella and extensor tendons. There also appears to be diffuse subcutaneous edema. No definite acute osseous abnormality.    Impression  Impression:  1.Status post knee arthroplasty.  No definite acute hardware or osseous complication.  2.Nonspecific small to moderate knee effusion, subcutaneous edema, and anterior soft tissue prominence. Joint or soft tissue infection should be considered based upon patient's reported symptoms. Correlate with clinical findings, lab values, and  consider  joint aspiration.          Electronically Signed: Adrien Alvarenga  2/24/2025 7:23 AM EST  Workstation ID: [id number]      Results for orders placed during the hospital encounter of 02/24/25    Duplex Venous Lower Extremity - Left CAR    Interpretation Summary    Normal left lower extremity venous duplex scan.        ASSESSMENT / PLAN      Pyogenic arthritis of left knee joint    S/P revision of total knee, left    Knee pain    Essential tremor    Essential hypertension    Dehydration    NATALIO (acute kidney injury)    Fever    Generalized weakness    Confusion    Abnormal liver function test    NATALIO-likely prerenal in setting of hypotension/diuretic and or ACE inhibitor use and with decreased p.o. 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

## 2025-02-27 NOTE — PROGRESS NOTES
DATE OF ADMISSION: 2/24/2025  6:21 AM     LOS: 3 days     Subjective :   Patient seen lying in bed. Reports mild pain in the left knee controlled with meds. Has been up with physical therapy yesterday.    Objective :    Vital signs in last 24 hours:  Vitals:    02/26/25 2310 02/27/25 0404 02/27/25 0527 02/27/25 0532   BP: 134/60 160/67  134/58   BP Location: Left arm Right arm     Patient Position:  Lying     Pulse: 66  66 73   Resp: 15 16 14    Temp:  98.2 °F (36.8 °C)     TempSrc:  Oral     SpO2:  98%     Weight:       Height:         Body mass index is 27.52 kg/m².    PHYSICAL EXAM:  Patient is calm, in no acute distress, awake and oriented x 3.  Dressing on left knee is clean, dry and intact.  Drain in proper placement.  No signs of infection.  Swelling is appropriate in amount.  Homans test is negative.  Ankle and foot ROM normal.  Patient is neurovascularly intact distally.    LABS:  Results from last 7 days   Lab Units 02/26/25  2333   WBC 10*3/mm3 8.39   HEMOGLOBIN g/dL 10.8*   HEMATOCRIT % 32.6*   PLATELETS 10*3/mm3 208     Results from last 7 days   Lab Units 02/26/25  2333   SODIUM mmol/L 137   POTASSIUM mmol/L 4.2   CHLORIDE mmol/L 107   CO2 mmol/L 20.6*   BUN mg/dL 22   CREATININE mg/dL 1.20   GLUCOSE mg/dL 142*   CALCIUM mg/dL 8.0*     Results from last 7 days   Lab Units 02/25/25  0012 02/24/25  0723   INR  1.23* 1.16*   APTT seconds 34.3 31.2       ASSESSMENT:  Status post left knee polyethylene exchange and washout    Plan:  Drain output 85mL at 1500 and 20mL at 2300 last night. If output remains < 25 mL for the duration of one shift, the drain can be removed. Did not appear to be much blood in the drain when at bedside this morning.   Continue Physical Therapy, increase mobility and range of motion as tolerated.   WBAT on left lower extremity.  Continue SCDs, Continue DVT prophylaxis.  Continue antibiotics per infectious disease.  Continue lolis medication as needed and as directed.  Dispo  planning for home vs rehab once cleared by medicine.    Rubi Lopez PA-C    Date: 2/27/2025  Time: 08:07 EST

## 2025-02-27 NOTE — PLAN OF CARE
Goal Outcome Evaluation:         Patient up in chair most of shift. Transfers with 2 assist and walker. Pain controlled with medication. Appetite good. Continent of b/b

## 2025-02-27 NOTE — PROGRESS NOTES
Infectious Diseases Progress Note      LOS: 3 days   Patient Care Team:  Rolf Harvey MD as PCP - General (Family Medicine)  Cedric Jacobs MD as Consulting Physician (Nephrology)    Chief Complaint: Left knee pain and swelling, rash    Subjective       The patient has been afebrile for the last 24 hours.  The patient is on room air, hemodynamically stable, and is tolerating antimicrobial therapy.  Has improved today      Review of Systems:   Review of Systems   Constitutional: Negative.    HENT: Negative.     Eyes: Negative.    Respiratory: Negative.     Cardiovascular: Negative.    Gastrointestinal: Negative.    Endocrine: Negative.    Genitourinary: Negative.    Musculoskeletal:  Positive for joint swelling.   Skin:  Positive for rash.   Neurological: Negative.    Psychiatric/Behavioral: Negative.     All other systems reviewed and are negative.       Objective     Vital Signs  Temp:  [98.1 °F (36.7 °C)-98.8 °F (37.1 °C)] 98.1 °F (36.7 °C)  Heart Rate:  [66-73] 70  Resp:  [14-18] 18  BP: (132-160)/(55-67) 132/59    Physical Exam:  Physical Exam  Vitals and nursing note reviewed.   Constitutional:       General: He is not in acute distress.     Appearance: Normal appearance. He is well-developed and normal weight. He is not diaphoretic.   HENT:      Head: Normocephalic and atraumatic.   Eyes:      Conjunctiva/sclera: Conjunctivae normal.      Pupils: Pupils are equal, round, and reactive to light.   Cardiovascular:      Rate and Rhythm: Normal rate and regular rhythm.      Heart sounds: Normal heart sounds, S1 normal and S2 normal.   Pulmonary:      Effort: Pulmonary effort is normal. No respiratory distress.      Breath sounds: Normal breath sounds. No stridor. No wheezing or rales.   Abdominal:      General: Bowel sounds are normal. There is no distension.      Palpations: Abdomen is soft. There is no mass.      Tenderness: There is no abdominal tenderness. There is no guarding.   Musculoskeletal:          General: No deformity. Normal range of motion.      Cervical back: Neck supple.      Comments: Left knee with dressing in place and Hemovac with bloody drainage    Skin:     General: Skin is warm and dry.      Coloration: Skin is not pale.      Findings: Rash present. No erythema.      Comments: Papular rash throughout the patient's back but does not appear to be on any other part of his body   Neurological:      Mental Status: He is alert and oriented to person, place, and time.      Cranial Nerves: No cranial nerve deficit.   Psychiatric:         Mood and Affect: Mood normal.          Results Review:    I have reviewed all clinical data, test, lab, and imaging results.     Radiology  No Radiology Exams Resulted Within Past 24 Hours    Cardiology    Laboratory    Results from last 7 days   Lab Units 02/26/25  2333 02/26/25  0401 02/25/25  0012 02/24/25  0730 02/24/25  0723   WBC 10*3/mm3 8.39 9.24 6.92  --  8.51   HEMOGLOBIN g/dL 10.8* 11.7* 12.8*  --  14.1   HEMOGLOBIN, POC g/dL  --   --   --  14.3  --    HEMATOCRIT % 32.6* 35.8* 38.9  --  41.9   HEMATOCRIT POC %  --   --   --  42  --    PLATELETS 10*3/mm3 208 162 173  --  184     Results from last 7 days   Lab Units 02/26/25  2333 02/26/25 0401 02/25/25  1144 02/25/25  0012 02/24/25  0730 02/24/25  0723   SODIUM mmol/L 137 138  --  136  --  137   POTASSIUM mmol/L 4.2 4.0  --  4.1  --  3.5   CHLORIDE mmol/L 107 106  --  104  --  100   CO2 mmol/L 20.6* 22.5  --  22.9  --  24.7   BUN mg/dL 22 32*  --  41*  --  37*   CREATININE mg/dL 1.20 1.21  --  1.68* 2.10* 1.96*   GLUCOSE mg/dL 142* 117*  --  119*  --  136*   ALBUMIN g/dL 3.0* 3.1*  --  3.2*  3.2*  --  3.7   BILIRUBIN mg/dL  --   --   --  0.8  --  1.2   ALK PHOS U/L  --   --   --  57  --  69   AST (SGOT) U/L  --   --   --  86*  --  138*   ALT (SGPT) U/L  --   --   --  99*  --  131*   AMMONIA umol/L  --   --  34  --   --   --    CALCIUM mg/dL 8.0* 8.0*  --  7.9*  --  9.0         Results from last 7 days   Lab  Units 02/24/25  0723   SED RATE mm/hr 20         Microbiology   Microbiology Results (last 10 days)       Procedure Component Value - Date/Time    Tissue / Bone Culture - Tissue, Knee, Left [[id number]]  (Abnormal) Collected: 02/25/25 0743    Lab Status: Preliminary result Specimen: Tissue from Knee, Left Updated: 02/27/25 0745     Tissue Culture Rare growth Streptococcus agalactiae (Group B)     Comment:   This organism is considered to be universally susceptible to penicillin.  No further antibiotic testing will be performed. If Clindamycin or Erythromycin is the drug of choice, notify the laboratory within 7 days to request susceptibility testing.        Gram Stain Many (4+) WBCs per low power field      Rare (1+) Gram negative bacilli    Tissue / Bone Culture - Tissue, Knee, Left [[id number]]  (Abnormal) Collected: 02/25/25 0741    Lab Status: Preliminary result Specimen: Tissue from Knee, Left Updated: 02/27/25 0744     Tissue Culture Rare growth Streptococcus agalactiae (Group B)     Comment:   This organism is considered to be universally susceptible to penicillin.  No further antibiotic testing will be performed. If Clindamycin or Erythromycin is the drug of choice, notify the laboratory within 7 days to request susceptibility testing.        Gram Stain Many (4+) WBCs per low power field      Rare (1+) Gram negative bacilli    Wound Culture - Surgical Site, Knee, Left [[id number]]  (Abnormal) Collected: 02/25/25 0737    Lab Status: Preliminary result Specimen: Surgical Site from Knee, Left Updated: 02/27/25 0743     Wound Culture Scant growth (1+) Streptococcus agalactiae (Group B)     Comment:   This organism is considered to be universally susceptible to penicillin.  No further antibiotic testing will be performed. If Clindamycin or Erythromycin is the drug of choice, notify the laboratory within 7 days to request susceptibility testing.        Gram Stain Many (4+) WBCs per low power field      No organisms  seen    MRSA Screen, PCR (Inpatient) - Swab, Nares [[id number]]  (Normal) Collected: 02/24/25 1407    Lab Status: Final result Specimen: Swab from Nares Updated: 02/24/25 1615     MRSA PCR No MRSA Detected    Narrative:      The negative predictive value of this diagnostic test is high and should only be used to consider de-escalating anti-MRSA therapy. A positive result may indicate colonization with MRSA and must be correlated clinically.    COVID-19 and FLU A/B PCR, 1 HR TAT - Swab, Nasopharynx [[id number]]  (Normal) Collected: 02/24/25 1128    Lab Status: Final result Specimen: Swab from Nasopharynx Updated: 02/24/25 1154     COVID19 Not Detected     Influenza A PCR Not Detected     Influenza B PCR Not Detected    Narrative:      Fact sheet for providers: https://www.fda.gov/media/705209/download    Fact sheet for patients: https://www.fda.gov/media/045327/download    Test performed by PCR.    Body Fluid Culture - Synovial Fluid, Knee, Left [[id number]]  (Abnormal) Collected: 02/24/25 1000    Lab Status: Final result Specimen: Synovial Fluid from Knee, Left Updated: 02/27/25 0746     Body Fluid Culture Light growth (2+) Streptococcus agalactiae (Group B)     Comment:   This organism is considered to be universally susceptible to penicillin.  No further antibiotic testing will be performed.        Gram Stain Many (4+) WBCs per low power field      No organisms seen    Blood Culture - Blood, Arm, Left [[id number]]  (Normal) Collected: 02/24/25 0820    Lab Status: Preliminary result Specimen: Blood from Arm, Left Updated: 02/27/25 0830     Blood Culture No growth at 3 days    Narrative:      Less than seven (7) mL's of blood was collected.  Insufficient quantity may yield false negative results.    Blood Culture - Blood, Arm, Left [[id number]]  (Normal) Collected: 02/24/25 0723    Lab Status: Preliminary result Specimen: Blood from Arm, Left Updated: 02/27/25 0730     Blood Culture No growth at 3 days             Medication Review:       Schedule Meds  [Held by provider] aspirin, 81 mg, Oral, BID  cefTRIAXone, 2,000 mg, Intravenous, Q24H  dicyclomine, 10 mg, Oral, TID  [Held by provider] gabapentin, 300 mg, Oral, TID  sodium chloride, 10 mL, Intravenous, Q12H  tamsulosin, 0.4 mg, Oral, Q PM        Infusion Meds       PRN Meds    acetaminophen **OR** acetaminophen **OR** acetaminophen    senna-docusate sodium **AND** polyethylene glycol **AND** bisacodyl **AND** bisacodyl    Calcium Replacement - Follow Nurse / BPA Driven Protocol    hydrALAZINE    HYDROcodone-acetaminophen    HYDROmorphone    hydrOXYzine    Magnesium Standard Dose Replacement - Follow Nurse / BPA Driven Protocol    melatonin    nitroglycerin    ondansetron ODT **OR** ondansetron    Phosphorus Replacement - Follow Nurse / BPA Driven Protocol    Potassium Replacement - Follow Nurse / BPA Driven Protocol    sodium chloride    sodium chloride        Assessment & Plan       Antimicrobial Therapy   1.  IV ceftriaxone     3.        4.        5.            Assessment     Left total knee infection.  Patient status post aspiration in the ED that showed over 93,000 total nucleated cells and purulence was seen both at the prepatellar bursa and deeper into the knee during an incision and drainage and liner exchange on 2/25/2025.  Aspiration culture and interoperative cultures are growing group B streptococcus     History of total left knee replacement in 2008.  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  Does not appear to be infectious in nature     Plan     Discontinue IV ceftazidime.  Start IV ceftriaxone 2 g daily for 6 weeks.  Last day will be April 7, 2025.    Patient will need a PICC line before discharge-please remove when IV antibiotics are completed.  Standard weekly PICC line care  Weekly labs CBC, creatinine and  sed rate x 6 weeks  Continue supportive care  Arenee Payton, APRN  02/27/25  17:07 EST    Note is dictated utilizing voice recognition software/Dragon

## 2025-02-27 NOTE — PLAN OF CARE
Goal Outcome Evaluation:      C/o pain incision site mostly with activity. Patient is alert and oriented x 4, dressing on left leg is dry and intact.  Skin is warm to dry. Ice pack on leg. Maximum assists when out of bed, gait is unsteady.. plan of care is ongoing.

## 2025-02-27 NOTE — PLAN OF CARE
"Assessment: Juan Marley presents with functional mobility impairments which indicate the need for skilled intervention. Tolerating session today without incident. Pt with poor weight bearing tolerance through LLE while upright, requiring significant amount of assistance for upright balance and to transfer between EOB, BSC, and recliner chair. Recommending SNF at discharge. Will continue to follow and progress as tolerated.     Plan/Recommendations:   If medically appropriate, Moderate Intensity Therapy recommended post-acute care. This is recommended as therapy feels the patient would require 3-4 days per week and wouldn't tolerate \"3 hour daily\" rehab intensity. SNF would be the preferred choice. If the patient does not agree to SNF, arrange HH or OP depending on home bound status. If patient is medically complex, consider LTACH. Pt requires no DME at discharge.     Pt desires Skilled Rehab placement at discharge. Pt cooperative; agreeable to therapeutic recommendations and plan of care.     "

## 2025-02-27 NOTE — PROGRESS NOTES
LOS: 3 days   Patient Care Team:  Rolf Harvey MD as PCP - General (Family Medicine)  Cedric Jacobs MD as Consulting Physician (Nephrology)    Subjective     Patient c/o of worsening pain with movement    Review of Systems   Constitutional:  Positive for activity change and fatigue.   HENT: Negative.     Respiratory: Negative.     Cardiovascular: Negative.    Gastrointestinal: Negative.    Genitourinary: Negative.    Musculoskeletal:  Positive for gait problem and joint swelling.   Neurological:  Positive for weakness.   Psychiatric/Behavioral: Negative.             Objective     Vital Signs  Temp:  [98.1 °F (36.7 °C)-98.8 °F (37.1 °C)] 98.1 °F (36.7 °C)  Heart Rate:  [66-73] 70  Resp:  [12-18] 18  BP: (111-160)/(55-67) 132/59      Physical Exam  Vitals reviewed.   Constitutional:       Appearance: He is not ill-appearing.   HENT:      Head: Normocephalic and atraumatic.      Right Ear: External ear normal.      Left Ear: External ear normal.      Nose: Nose normal.      Mouth/Throat:      Mouth: Mucous membranes are moist.   Eyes:      General:         Right eye: No discharge.         Left eye: No discharge.   Cardiovascular:      Rate and Rhythm: Normal rate and regular rhythm.      Pulses: Normal pulses.      Heart sounds: Normal heart sounds.   Pulmonary:      Effort: Pulmonary effort is normal.      Breath sounds: Normal breath sounds.   Abdominal:      General: Bowel sounds are normal.      Palpations: Abdomen is soft.   Musculoskeletal:         General: Normal range of motion.      Cervical back: Normal range of motion.   Skin:     General: Skin is warm and dry.   Neurological:      Mental Status: He is alert and oriented to person, place, and time.   Psychiatric:         Behavior: Behavior normal.              Results Review:    Lab Results (last 24 hours)       Procedure Component Value Units Date/Time    Vitamin D,25-Hydroxy [[id number]]  (Abnormal) Collected: 02/26/25 6097    Specimen: Blood  from Arm, Left Updated: 02/27/25 1048     25 Hydroxy, Vitamin D 12.9 ng/ml     Narrative:      Reference Range for Total Vitamin D 25(OH)     Deficiency <20.0 ng/mL   Insufficiency 21-29 ng/mL   Sufficiency  ng/mL  Toxicity >100 ng/ml      Blood Culture - Blood, Arm, Left [[id number]]  (Normal) Collected: 02/24/25 0820    Specimen: Blood from Arm, Left Updated: 02/27/25 0830     Blood Culture No growth at 3 days    Narrative:      Less than seven (7) mL's of blood was collected.  Insufficient quantity may yield false negative results.    Body Fluid Culture - Synovial Fluid, Knee, Left [[id number]]  (Abnormal) Collected: 02/24/25 1000    Specimen: Synovial Fluid from Knee, Left Updated: 02/27/25 0746     Body Fluid Culture Light growth (2+) Streptococcus agalactiae (Group B)     Comment:   This organism is considered to be universally susceptible to penicillin.  No further antibiotic testing will be performed.        Gram Stain Many (4+) WBCs per low power field      No organisms seen    Tissue / Bone Culture - Tissue, Knee, Left [[id number]]  (Abnormal) Collected: 02/25/25 0743    Specimen: Tissue from Knee, Left Updated: 02/27/25 0745     Tissue Culture Rare growth Streptococcus agalactiae (Group B)     Comment:   This organism is considered to be universally susceptible to penicillin.  No further antibiotic testing will be performed. If Clindamycin or Erythromycin is the drug of choice, notify the laboratory within 7 days to request susceptibility testing.        Gram Stain Many (4+) WBCs per low power field      Rare (1+) Gram negative bacilli    Tissue / Bone Culture - Tissue, Knee, Left [[id number]]  (Abnormal) Collected: 02/25/25 0741    Specimen: Tissue from Knee, Left Updated: 02/27/25 0744     Tissue Culture Rare growth Streptococcus agalactiae (Group B)     Comment:   This organism is considered to be universally susceptible to penicillin.  No further antibiotic testing will be performed. If Clindamycin  or Erythromycin is the drug of choice, notify the laboratory within 7 days to request susceptibility testing.        Gram Stain Many (4+) WBCs per low power field      Rare (1+) Gram negative bacilli    Wound Culture - Surgical Site, Knee, Left [[id number]]  (Abnormal) Collected: 02/25/25 0737    Specimen: Surgical Site from Knee, Left Updated: 02/27/25 0743     Wound Culture Scant growth (1+) Streptococcus agalactiae (Group B)     Comment:   This organism is considered to be universally susceptible to penicillin.  No further antibiotic testing will be performed. If Clindamycin or Erythromycin is the drug of choice, notify the laboratory within 7 days to request susceptibility testing.        Gram Stain Many (4+) WBCs per low power field      No organisms seen    Blood Culture - Blood, Arm, Left [[id number]]  (Normal) Collected: 02/24/25 0723    Specimen: Blood from Arm, Left Updated: 02/27/25 0730     Blood Culture No growth at 3 days    Renal Function Panel [[id number]]  (Abnormal) Collected: 02/26/25 2333    Specimen: Blood from Arm, Left Updated: 02/27/25 0031     Glucose 142 mg/dL      BUN 22 mg/dL      Creatinine 1.20 mg/dL      Sodium 137 mmol/L      Potassium 4.2 mmol/L      Chloride 107 mmol/L      CO2 20.6 mmol/L      Calcium 8.0 mg/dL      Albumin 3.0 g/dL      Phosphorus 2.5 mg/dL      Anion Gap 9.4 mmol/L      BUN/Creatinine Ratio 18.3     eGFR 60.4 mL/min/1.73     Narrative:      GFR Categories in Chronic Kidney Disease (CKD)      GFR Category          GFR (mL/min/1.73)    Interpretation  G1                     90 or greater         Normal or high (1)  G2                      60-89                Mild decrease (1)  G3a                   45-59                Mild to moderate decrease  G3b                   30-44                Moderate to severe decrease  G4                    15-29                Severe decrease  G5                    14 or less           Kidney failure          (1)In the absence of  evidence of kidney disease, neither GFR category G1 or G2 fulfill the criteria for CKD.    eGFR calculation 2021 CKD-EPI creatinine equation, which does not include race as a factor    CBC & Differential [[id number]]  (Abnormal) Collected: 02/26/25 2333    Specimen: Blood from Arm, Left Updated: 02/26/25 2341    Narrative:      The following orders were created for panel order CBC & Differential.  Procedure                               Abnormality         Status                     ---------                               -----------         ------                     CBC Auto Differential[[id number]]        Abnormal            Final result                 Please view results for these tests on the individual orders.    CBC Auto Differential [[id number]]  (Abnormal) Collected: 02/26/25 2333    Specimen: Blood from Arm, Left Updated: 02/26/25 2341     WBC 8.39 10*3/mm3      RBC 3.56 10*6/mm3      Hemoglobin 10.8 g/dL      Hematocrit 32.6 %      MCV 91.6 fL      MCH 30.3 pg      MCHC 33.1 g/dL      RDW 12.5 %      RDW-SD 42.2 fl      MPV 10.8 fL      Platelets 208 10*3/mm3      Neutrophil % 80.8 %      Lymphocyte % 7.9 %      Monocyte % 8.0 %      Eosinophil % 2.6 %      Basophil % 0.2 %      Immature Grans % 0.5 %      Neutrophils, Absolute 6.78 10*3/mm3      Lymphocytes, Absolute 0.66 10*3/mm3      Monocytes, Absolute 0.67 10*3/mm3      Eosinophils, Absolute 0.22 10*3/mm3      Basophils, Absolute 0.02 10*3/mm3      Immature Grans, Absolute 0.04 10*3/mm3      nRBC 0.0 /100 WBC     Phosphorus [[id number]]  (Abnormal) Collected: 02/26/25 1519    Specimen: Blood from Arm, Left Updated: 02/26/25 1551     Phosphorus 1.7 mg/dL     Glucose, Body Fluid - Synovial Fluid, Knee, Left [[id number]] Collected: 02/24/25 1000    Specimen: Synovial Fluid from Knee, Left Updated: 02/26/25 1310     Glucose, Fluid <2 mg/dL      Comment:              _________________________________________              : BODY FLUID TYPE :        GLUCOSE         :              :_________________:_______________________:              : Amniotic Fluid  :        45 - 76        :              :_________________:_______________________:              : Bile, Clear     :            < 5        :              :_________________:_______________________:              : Bile, Yellow    :            < 8        :              :_________________:_______________________:              : Lymph           :        48 - 200       :              :_________________:_______________________:              : Nasal Secretion :            < 10       :              :_________________:_______________________:              : Pleural Fluid   :        65 -  99       :              :_________________:_______________________:              : Saliva          :            <  2       :              : (Mixed Glands)  :                       :              :_________________:_______________________:              : Sweat           :            <  7       :              :_________________:_______________________:              : Synovial Fluid  :        65 -  99       :              :_________________:_______________________:              : Tears           :        76 - 288       :              :_________________:_______________________:               Renaldo Keen. Reference Intervals               for Adults and Children 2008. Ninth               edition (V9.1) [employer],               [facility]; [place]: July 2009.  **Verified by repeat analysis**       Narrative:      Performed at:  [address] [place] [address]  [id number]  : Richard Jimenez PhD, Phone:  [phone]    Protein, Body Fluid - Synovial Fluid, Knee, Left [[id number]] Collected: 02/24/25 1000    Specimen: Synovial Fluid from Knee, Left Updated: 02/26/25 1310     Protein, Fluid 4.4 g/dL      Comment:              _________________________________________              : BODY FLUID TYPE :     TOTAL  PROTEIN     :              :_________________:_______________________:              : Amniotic Fluid  :               <0.4    :              :_________________:_______________________:              :                 : Nonmalignant: <3.0    :              : Ascitic Fluid   : Malignant:    >3.0    :              :_________________:_______________________:              : Bile, Clear     :               <0.9    :              :_________________:_______________________:              : Bile, Yellow    :          0.2 - 0.6    :              :_________________:_______________________:              : Lymph           :          2.2 - 6.0    :              :_________________:_______________________:              : Human Milk      :          1.9 - 2.0    :              :_________________: ______________________:              : Nasal Secretion :          0.1 - 3.5    :              :_________________:_______________________:              : Pancreatic      :          0.0 - 0.1    :              : Juice           :    (post stimulation) :              :_________________:_______________________:              :                 : Transudate:   <0.3    :              : Pleural Fluid   : Exudate:      >0.3    :              :_________________:_______________________:              : Saliva          :          0.1 - 0.2    :              : (Mixed Glands)  :                       :              :_________________:_______________________:              : Synovial Fluid  :               <2.5    :              :_________________:_______________________:              : Tears           :          0.8 - 0.9    :              :_________________:_______________________:               Renaldo Keen V. Reference Intervals               for  Adults and Children 2008. Ninth               Edition (V9.1) [employer],               [facility]; [place]: July 2009.       Narrative:      Performed at:  [address],  [place], [place]  [id number]  : Richard Jimenez PhD, Phone:  [phone]             Imaging Results (Last 24 Hours)       ** No results found for the last 24 hours. **                 I reviewed the patient's new clinical results.    Medication Review:   Scheduled Meds:[Held by provider] aspirin, 81 mg, Oral, BID  cefTRIAXone, 2,000 mg, Intravenous, Q24H  dicyclomine, 10 mg, Oral, TID  [Held by provider] gabapentin, 300 mg, Oral, TID  sodium chloride, 10 mL, Intravenous, Q12H  tamsulosin, 0.4 mg, Oral, Q PM      Continuous Infusions:   PRN Meds:.  acetaminophen **OR** acetaminophen **OR** acetaminophen    senna-docusate sodium **AND** polyethylene glycol **AND** bisacodyl **AND** bisacodyl    Calcium Replacement - Follow Nurse / BPA Driven Protocol    hydrALAZINE    HYDROcodone-acetaminophen    HYDROmorphone    hydrOXYzine    Magnesium Standard Dose Replacement - Follow Nurse / BPA Driven Protocol    melatonin    nitroglycerin    ondansetron ODT **OR** ondansetron    Phosphorus Replacement - Follow Nurse / BPA Driven Protocol    Potassium Replacement - Follow Nurse / BPA Driven Protocol    sodium chloride    sodium chloride     Interval History:    Assessment & Plan     Pyogenic arthritis of left knee joint  S/P revision of total knee, left  Knee pain  Essential tremor  Essential hypertension  Dehydration  NATALIO (acute kidney injury)  Fever  Generalized weakness  Confusion   Abnormal liver function test    Plan of care  Left total knee infection. Patient status post aspiration in the ED that showed over 93,000 total nucleated cells and purulence was seen both at the prepatellar bursa and deeper into the knee during an incision and drainage and liner exchange on 2/25/2025     Continue IV ceftazidime 2 g every 12 hours  Waiting on interoperative cultures  Patient will need 6 weeks of IV antibiotics  LFT improving liver ultrasound unremarkable  Altered mental status resolved and r/t medication    Plan for  disposition:[facility]    Radha Garner, APRN  02/27/25  12:31 EST

## 2025-02-28 LAB
BACTERIA SPEC AEROBE CULT: ABNORMAL
GRAM STN SPEC: ABNORMAL
PHOSPHATE SERPL-MCNC: 2.5 MG/DL (ref 2.5–4.5)

## 2025-02-28 PROCEDURE — 84100 ASSAY OF PHOSPHORUS: CPT | Performed by: INTERNAL MEDICINE

## 2025-02-28 PROCEDURE — 97110 THERAPEUTIC EXERCISES: CPT

## 2025-02-28 PROCEDURE — 25010000002 CEFTRIAXONE PER 250 MG: Performed by: INTERNAL MEDICINE

## 2025-02-28 PROCEDURE — 97116 GAIT TRAINING THERAPY: CPT

## 2025-02-28 PROCEDURE — 80069 RENAL FUNCTION PANEL: CPT | Performed by: ORTHOPAEDIC SURGERY

## 2025-02-28 PROCEDURE — 97530 THERAPEUTIC ACTIVITIES: CPT

## 2025-02-28 RX ORDER — ERGOCALCIFEROL 1.25 MG/1
50000 CAPSULE, LIQUID FILLED ORAL
Status: DISCONTINUED | OUTPATIENT
Start: 2025-02-28 | End: 2025-03-05 | Stop reason: HOSPADM

## 2025-02-28 RX ADMIN — ERGOCALCIFEROL 50000 UNITS: 1.25 CAPSULE ORAL at 14:03

## 2025-02-28 RX ADMIN — Medication 10 ML: at 09:33

## 2025-02-28 RX ADMIN — Medication 2 PACKET: at 03:51

## 2025-02-28 RX ADMIN — DICYCLOMINE HYDROCHLORIDE 10 MG: 10 CAPSULE ORAL at 09:27

## 2025-02-28 RX ADMIN — ACETAMINOPHEN 650 MG: 325 TABLET, FILM COATED ORAL at 20:14

## 2025-02-28 RX ADMIN — HYDROCODONE BITARTRATE AND ACETAMINOPHEN 1 TABLET: 7.5; 325 TABLET ORAL at 09:27

## 2025-02-28 RX ADMIN — TAMSULOSIN HYDROCHLORIDE 0.4 MG: 0.4 CAPSULE ORAL at 17:07

## 2025-02-28 RX ADMIN — DICYCLOMINE HYDROCHLORIDE 10 MG: 10 CAPSULE ORAL at 20:14

## 2025-02-28 RX ADMIN — DICYCLOMINE HYDROCHLORIDE 10 MG: 10 CAPSULE ORAL at 15:25

## 2025-02-28 RX ADMIN — CEFTRIAXONE 2000 MG: 2 INJECTION, POWDER, FOR SOLUTION INTRAMUSCULAR; INTRAVENOUS at 15:25

## 2025-02-28 RX ADMIN — Medication 10 ML: at 20:15

## 2025-02-28 NOTE — PLAN OF CARE
Juan Marley presents with steady progress toward goals, with less assist needed for sit to/from stand today. Pt with ongoing impairment in LLE positioning in standing and LLE WB tolerance. Pre gait activity completed for improved technique and improved pt confidence; pt tolerates well. Pt with strength, ROM, and functional mobility impairments which indicate the need for skilled intervention. Tolerating session today without incident. Will continue to follow and progress as tolerated.     Anticipated Discharge Disposition (PT): skilled nursing facility

## 2025-02-28 NOTE — PROGRESS NOTES
LOS: 4 days   Patient Care Team:  Rolf Harvey MD as PCP - General (Family Medicine)  Cedric Jacobs MD as Consulting Physician (Nephrology)    Subjective     Patient states pain and mobility are improving    Review of Systems   Constitutional:  Positive for activity change and fatigue.   HENT: Negative.     Respiratory: Negative.     Cardiovascular: Negative.    Gastrointestinal: Negative.    Genitourinary: Negative.    Musculoskeletal:  Positive for gait problem and joint swelling.   Neurological:  Positive for weakness.   Psychiatric/Behavioral: Negative.             Objective     Vital Signs  Temp:  [97.9 °F (36.6 °C)-98.4 °F (36.9 °C)] 98.4 °F (36.9 °C)  Heart Rate:  [64-67] 64  Resp:  [15-20] 20  BP: (138-173)/(66-75) 173/75      Physical Exam  Vitals reviewed.   Constitutional:       Appearance: He is not ill-appearing.   HENT:      Head: Normocephalic and atraumatic.      Right Ear: External ear normal.      Left Ear: External ear normal.      Nose: Nose normal.      Mouth/Throat:      Mouth: Mucous membranes are moist.   Eyes:      General:         Right eye: No discharge.         Left eye: No discharge.   Cardiovascular:      Rate and Rhythm: Normal rate and regular rhythm.      Pulses: Normal pulses.      Heart sounds: Normal heart sounds.   Pulmonary:      Effort: Pulmonary effort is normal.      Breath sounds: Normal breath sounds.   Abdominal:      General: Bowel sounds are normal.      Palpations: Abdomen is soft.   Musculoskeletal:         General: Normal range of motion.      Cervical back: Normal range of motion.   Skin:     General: Skin is warm and dry.   Neurological:      Mental Status: He is alert and oriented to person, place, and time.   Psychiatric:         Behavior: Behavior normal.              Results Review:    Lab Results (last 24 hours)       Procedure Component Value Units Date/Time    Phosphorus [[id number]]  (Normal) Collected: 02/28/25 1053    Specimen: Blood from  Hand, Left Updated: 02/28/25 1126     Phosphorus 2.5 mg/dL     Wound Culture - Surgical Site, Knee, Left [[id number]]  (Abnormal) Collected: 02/25/25 0737    Specimen: Surgical Site from Knee, Left Updated: 02/28/25 1041     Wound Culture Scant growth (1+) Streptococcus agalactiae (Group B)     Comment:   This organism is considered to be universally susceptible to penicillin.  No further antibiotic testing will be performed. If Clindamycin or Erythromycin is the drug of choice, notify the laboratory within 7 days to request susceptibility testing.        Gram Stain Many (4+) WBCs per low power field      No organisms seen    Tissue / Bone Culture - Tissue, Knee, Left [[id number]]  (Abnormal) Collected: 02/25/25 0743    Specimen: Tissue from Knee, Left Updated: 02/28/25 1040     Tissue Culture Rare growth Streptococcus agalactiae (Group B)     Comment:   This organism is considered to be universally susceptible to penicillin.  No further antibiotic testing will be performed. If Clindamycin or Erythromycin is the drug of choice, notify the laboratory within 7 days to request susceptibility testing.        Gram Stain Many (4+) WBCs per low power field      Rare (1+) Gram negative bacilli    Tissue / Bone Culture - Tissue, Knee, Left [[id number]]  (Abnormal) Collected: 02/25/25 0741    Specimen: Tissue from Knee, Left Updated: 02/28/25 1040     Tissue Culture Rare growth Streptococcus agalactiae (Group B)     Comment:   This organism is considered to be universally susceptible to penicillin.  No further antibiotic testing will be performed. If Clindamycin or Erythromycin is the drug of choice, notify the laboratory within 7 days to request susceptibility testing.        Gram Stain Many (4+) WBCs per low power field      Rare (1+) Gram negative bacilli    Anaerobic Culture - Surgical Site, Knee, Left [[id number]]  (Normal) Collected: 02/25/25 0737    Specimen: Surgical Site from Knee, Left Updated: 02/28/25 0918      Anaerobic Culture No anaerobes isolated at 3 days    Blood Culture - Blood, Arm, Left [[id number]]  (Normal) Collected: 02/24/25 0820    Specimen: Blood from Arm, Left Updated: 02/28/25 0830     Blood Culture No growth at 4 days    Narrative:      Less than seven (7) mL's of blood was collected.  Insufficient quantity may yield false negative results.    Blood Culture - Blood, Arm, Left [[id number]]  (Normal) Collected: 02/24/25 0723    Specimen: Blood from Arm, Left Updated: 02/28/25 0730     Blood Culture No growth at 4 days    Renal Function Panel [[id number]]  (Abnormal) Collected: 02/27/25 2207    Specimen: Blood Updated: 02/27/25 2239     Glucose 160 mg/dL      BUN 17 mg/dL      Creatinine 0.97 mg/dL      Sodium 142 mmol/L      Potassium 3.9 mmol/L      Chloride 110 mmol/L      CO2 21.6 mmol/L      Calcium 7.9 mg/dL      Albumin 2.7 g/dL      Phosphorus 2.0 mg/dL      Anion Gap 10.4 mmol/L      BUN/Creatinine Ratio 17.5     eGFR 77.9 mL/min/1.73     Narrative:      GFR Categories in Chronic Kidney Disease (CKD)      GFR Category          GFR (mL/min/1.73)    Interpretation  G1                     90 or greater         Normal or high (1)  G2                      60-89                Mild decrease (1)  G3a                   45-59                Mild to moderate decrease  G3b                   30-44                Moderate to severe decrease  G4                    15-29                Severe decrease  G5                    14 or less           Kidney failure          (1)In the absence of evidence of kidney disease, neither GFR category G1 or G2 fulfill the criteria for CKD.    eGFR calculation 2021 CKD-EPI creatinine equation, which does not include race as a factor    CBC & Differential [[id number]]  (Abnormal) Collected: 02/27/25 2207    Specimen: Blood Updated: 02/27/25 2221    Narrative:      The following orders were created for panel order CBC & Differential.  Procedure                               Abnormality          Status                     ---------                               -----------         ------                     CBC Auto Differential[[id number]]        Abnormal            Final result                 Please view results for these tests on the individual orders.    CBC Auto Differential [[id number]]  (Abnormal) Collected: 02/27/25 2207    Specimen: Blood Updated: 02/27/25 2221     WBC 6.86 10*3/mm3      RBC 3.60 10*6/mm3      Hemoglobin 10.9 g/dL      Hematocrit 33.1 %      MCV 91.9 fL      MCH 30.3 pg      MCHC 32.9 g/dL      RDW 12.4 %      RDW-SD 41.9 fl      MPV 10.7 fL      Platelets 248 10*3/mm3      Neutrophil % 77.4 %      Lymphocyte % 9.0 %      Monocyte % 7.7 %      Eosinophil % 5.0 %      Basophil % 0.3 %      Immature Grans % 0.6 %      Neutrophils, Absolute 5.31 10*3/mm3      Lymphocytes, Absolute 0.62 10*3/mm3      Monocytes, Absolute 0.53 10*3/mm3      Eosinophils, Absolute 0.34 10*3/mm3      Basophils, Absolute 0.02 10*3/mm3      Immature Grans, Absolute 0.04 10*3/mm3      nRBC 0.0 /100 WBC              Imaging Results (Last 24 Hours)       ** No results found for the last 24 hours. **                 I reviewed the patient's new clinical results.    Medication Review:   Scheduled Meds:[Held by provider] aspirin, 81 mg, Oral, BID  cefTRIAXone, 2,000 mg, Intravenous, Q24H  dicyclomine, 10 mg, Oral, TID  [Held by provider] gabapentin, 300 mg, Oral, TID  sodium chloride, 10 mL, Intravenous, Q12H  tamsulosin, 0.4 mg, Oral, Q PM  vitamin D, 50,000 Units, Oral, Q7 Days      Continuous Infusions:   PRN Meds:.  acetaminophen **OR** acetaminophen **OR** acetaminophen    senna-docusate sodium **AND** polyethylene glycol **AND** bisacodyl **AND** bisacodyl    Calcium Replacement - Follow Nurse / BPA Driven Protocol    diphenhydrAMINE-zinc acetate    hydrALAZINE    HYDROcodone-acetaminophen    HYDROmorphone    hydrOXYzine    Magnesium Standard Dose Replacement - Follow Nurse / BPA Driven Protocol     melatonin    nitroglycerin    ondansetron ODT **OR** ondansetron    Phosphorus Replacement - Follow Nurse / BPA Driven Protocol    Potassium Replacement - Follow Nurse / BPA Driven Protocol    sodium chloride    sodium chloride     Interval History:    Assessment & Plan     Pyogenic arthritis of left knee joint  S/P revision of total knee, left  Knee pain  Essential tremor  Essential hypertension  Dehydration  NATALIO (acute kidney injury)  Fever  Generalized weakness  Confusion   Abnormal liver function test    Plan of care  Left total knee infection. Patient status post aspiration in the ED that showed over 93,000 total nucleated cells and purulence was seen both at the prepatellar bursa and deeper into the knee during an incision and drainage and liner exchange on 2/25/2025     Continue IV ceftazidime 2 g every 12 hours  Waiting on interoperative cultures  Patient will need 6 weeks of IV antibiotics  LFT improving liver ultrasound unremarkable  Altered mental status resolved and r/t medication    Plan for disposition:[facility]    Radha Garner, GONZALO  02/28/25  12:19 EST

## 2025-02-28 NOTE — PROGRESS NOTES
DATE OF ADMISSION: 2/24/2025  6:21 AM     LOS: 4 days     Subjective :   Patient seen lying in bed. Reports of mild pain in the left knee controlled with meds.    Objective :    Vital signs in last 24 hours:  Vitals:    02/27/25 0532 02/27/25 1111 02/27/25 2020 02/28/25 0404   BP: 134/58 132/59 138/66 158/66   BP Location:  Left arm Right arm Right arm   Patient Position:  Sitting Lying Lying   Pulse: 73 70 67 66   Resp:  18 17 15   Temp:  98.1 °F (36.7 °C) 97.9 °F (36.6 °C) 97.9 °F (36.6 °C)   TempSrc:  Oral Oral Oral   SpO2:  95% 95% 92%   Weight:       Height:         Body mass index is 27.52 kg/m².    PHYSICAL EXAM:  Patient is calm, in no acute distress, awake and oriented x 3.  Dressing on left knee is clean, dry and intact.  No signs of infection.  Swelling is appropriate in amount.  Homans test is negative.  Ankle and foot ROM normal.  Patient is neurovascularly intact distally.    LABS:  Results from last 7 days   Lab Units 02/27/25  2207   WBC 10*3/mm3 6.86   HEMOGLOBIN g/dL 10.9*   HEMATOCRIT % 33.1*   PLATELETS 10*3/mm3 248     Results from last 7 days   Lab Units 02/27/25  2207   SODIUM mmol/L 142   POTASSIUM mmol/L 3.9   CHLORIDE mmol/L 110*   CO2 mmol/L 21.6*   BUN mg/dL 17   CREATININE mg/dL 0.97   GLUCOSE mg/dL 160*   CALCIUM mg/dL 7.9*     Results from last 7 days   Lab Units 02/25/25  0012 02/24/25  0723   INR  1.23* 1.16*   APTT seconds 34.3 31.2       ASSESSMENT:  Status post left knee polyethylene exchange and washout    Plan:  Drain output minimal. Okay to be removed today.  Continue Physical Therapy, increase mobility and range of motion as tolerated.  WBAT on left lower extremity.  Continue SCDs, Continue DVT prophylaxis.  Plan for aspirin 81mg BID x 1 month after discharge.  Continue antibiotics per infectious disease.  Continue pain medication as needed and as directed.  Dispo planning for rehab once cleared by medicine.  Patient stable from orthopedic standpoint and okay to  discharge.  Patient advised to follow up in the outpatient office with Dr. Bruce 3 weeks from surgery date.  Will sign off at this time.    Rubi Lopez PA-C    Date: 2/28/2025  Time: 07:24 EST

## 2025-02-28 NOTE — CASE MANAGEMENT/SOCIAL WORK
Continued Stay Note  LUCAS Esteban     Patient Name: Juan Marley  MRN: [id number]  Today's Date: 2/28/2025    Admit Date: 2/24/2025    Plan: From home alone. Will need snf pending snf coices.  Will need precert PASRR  approved.   Discharge Plan       Row Name 02/28/25 1531       Plan    Plan From home alone. Will need snf pending snf coices.  Will need precert PASRR  approved.    Plan Comments DC barriers: pod#3  left knee polyethylene insert exchange and washout. , IV antibiotics, ( per ID will need 6 weeks of IV antibiotics) pending intra operative cultures. Call place to family for choices for snf and message left.  Will need precert                    Expected Discharge Date and Time       Expected Discharge Date Expected Discharge Time    Mar 2, 2025             Susy Cardenas RN    SIPS 1  [email or web address]  Office [phone]  Cell [phone]

## 2025-02-28 NOTE — DISCHARGE PLACEMENT REQUEST
"Juan Marley (84 y.o. Male)       Date of Birth   1940    Social Security Number       Address   [address] [facility] [place]    Home Phone   [phone]    MRN   [id number]       Episcopalian   None    Marital Status                               Admission Date   2/24/25    Admission Type   Emergency    Admitting Provider   Vanessa King MD    Attending Provider   Vanessa King MD    Department, Room/Bed   [facility], 4117/1       Discharge Date       Discharge Disposition       Discharge Destination                                 Attending Provider: Vanessa King MD    Allergies: No Known Allergies    Isolation: None   Infection: None   Code Status: No CPR    Ht: 177.8 cm (70\")   Wt: 87 kg (191 lb 12.8 oz)    Admission Cmt: None   Principal Problem: Pyogenic arthritis of left knee joint [M00.9]                   Active Insurance as of 2/24/2025       Primary Coverage       Payor Plan Insurance Group Employer/Plan Group    HUMANA MEDICARE REPLACEMENT HU[employer]NA MEDICARE ADVANTAGE PPO [id number]       Payor Plan Address Payor Plan Phone Number Payor Plan Fax Number Effective Dates    [address] [address] [phone]  2/1/2024 - None Entered    [facility] [address]         Subscriber Name Subscriber Birth Date Member ID       JUAN MARLEY 1940 [id number]                     Emergency Contacts        (Rel.) Home Phone Work Phone Mobile Phone    JOYCE MARLEY (Son) [phone] -- [phone]    brandi marley (Son) -- -- [phone]                "

## 2025-02-28 NOTE — PROGRESS NOTES
Infectious Diseases Progress Note      LOS: 4 days   Patient Care Team:  Rolf Harvey MD as PCP - General (Family Medicine)  Cedric Jacobs MD as Consulting Physician (Nephrology)    Chief Complaint: Left knee pain and swelling, rash    Subjective       The patient has been afebrile for the last 24 hours.  The patient is on room air, hemodynamically stable, and is tolerating antimicrobial therapy.  No new complaints.       Review of Systems:   Review of Systems   Constitutional: Negative.    HENT: Negative.     Eyes: Negative.    Respiratory: Negative.     Cardiovascular: Negative.    Gastrointestinal: Negative.    Endocrine: Negative.    Genitourinary: Negative.    Musculoskeletal:  Positive for joint swelling.   Skin:  Positive for rash.   Neurological: Negative.    Psychiatric/Behavioral: Negative.     All other systems reviewed and are negative.       Objective     Vital Signs  Temp:  [97.9 °F (36.6 °C)-98.4 °F (36.9 °C)] 98.4 °F (36.9 °C)  Heart Rate:  [64-67] 64  Resp:  [15-20] 20  BP: (138-173)/(66-75) 173/75    Physical Exam:  Physical Exam  Vitals and nursing note reviewed.   Constitutional:       General: He is not in acute distress.     Appearance: Normal appearance. He is well-developed and normal weight. He is not diaphoretic.   HENT:      Head: Normocephalic and atraumatic.   Eyes:      Conjunctiva/sclera: Conjunctivae normal.      Pupils: Pupils are equal, round, and reactive to light.   Cardiovascular:      Rate and Rhythm: Normal rate and regular rhythm.      Heart sounds: Normal heart sounds, S1 normal and S2 normal.   Pulmonary:      Effort: Pulmonary effort is normal. No respiratory distress.      Breath sounds: Normal breath sounds. No stridor. No wheezing or rales.   Abdominal:      General: Bowel sounds are normal. There is no distension.      Palpations: Abdomen is soft. There is no mass.      Tenderness: There is no abdominal tenderness. There is no guarding.   Musculoskeletal:          General: No deformity. Normal range of motion.      Cervical back: Neck supple.      Comments: Left knee with dressing with Hemovac removed   Skin:     General: Skin is warm and dry.      Coloration: Skin is not pale.      Findings: Rash present. No erythema.      Comments: Papular rash throughout the patient's back but does not appear to be on any other part of his body   Neurological:      Mental Status: He is alert and oriented to person, place, and time.      Cranial Nerves: No cranial nerve deficit.   Psychiatric:         Mood and Affect: Mood normal.          Results Review:    I have reviewed all clinical data, test, lab, and imaging results.     Radiology  No Radiology Exams Resulted Within Past 24 Hours    Cardiology    Laboratory    Results from last 7 days   Lab Units 02/27/25 2207 02/26/25 2333 02/26/25  0401 02/25/25  0012 02/24/25  0730 02/24/25  0723   WBC 10*3/mm3 6.86 8.39 9.24 6.92  --  8.51   HEMOGLOBIN g/dL 10.9* 10.8* 11.7* 12.8*  --  14.1   HEMOGLOBIN, POC g/dL  --   --   --   --  14.3  --    HEMATOCRIT % 33.1* 32.6* 35.8* 38.9  --  41.9   HEMATOCRIT POC %  --   --   --   --  42  --    PLATELETS 10*3/mm3 248 208 162 173  --  184     Results from last 7 days   Lab Units 02/27/25 2207 02/26/25 2333 02/26/25 0401 02/25/25  1144 02/25/25  0012 02/24/25  0730 02/24/25  0723   SODIUM mmol/L 142 137 138  --  136  --  137   POTASSIUM mmol/L 3.9 4.2 4.0  --  4.1  --  3.5   CHLORIDE mmol/L 110* 107 106  --  104  --  100   CO2 mmol/L 21.6* 20.6* 22.5  --  22.9  --  24.7   BUN mg/dL 17 22 32*  --  41*  --  37*   CREATININE mg/dL 0.97 1.20 1.21  --  1.68* 2.10* 1.96*   GLUCOSE mg/dL 160* 142* 117*  --  119*  --  136*   ALBUMIN g/dL 2.7* 3.0* 3.1*  --  3.2*  3.2*  --  3.7   BILIRUBIN mg/dL  --   --   --   --  0.8  --  1.2   ALK PHOS U/L  --   --   --   --  57  --  69   AST (SGOT) U/L  --   --   --   --  86*  --  138*   ALT (SGPT) U/L  --   --   --   --  99*  --  131*   AMMONIA umol/L  --   --   --   34  --   --   --    CALCIUM mg/dL 7.9* 8.0* 8.0*  --  7.9*  --  9.0         Results from last 7 days   Lab Units 02/24/25  0723   SED RATE mm/hr 20         Microbiology   Microbiology Results (last 10 days)       Procedure Component Value - Date/Time    Tissue / Bone Culture - Tissue, Knee, Left [[id number]]  (Abnormal) Collected: 02/25/25 0743    Lab Status: Final result Specimen: Tissue from Knee, Left Updated: 02/28/25 1040     Tissue Culture Rare growth Streptococcus agalactiae (Group B)     Comment:   This organism is considered to be universally susceptible to penicillin.  No further antibiotic testing will be performed. If Clindamycin or Erythromycin is the drug of choice, notify the laboratory within 7 days to request susceptibility testing.        Gram Stain Many (4+) WBCs per low power field      Rare (1+) Gram negative bacilli    Tissue / Bone Culture - Tissue, Knee, Left [[id number]]  (Abnormal) Collected: 02/25/25 0741    Lab Status: Final result Specimen: Tissue from Knee, Left Updated: 02/28/25 1040     Tissue Culture Rare growth Streptococcus agalactiae (Group B)     Comment:   This organism is considered to be universally susceptible to penicillin.  No further antibiotic testing will be performed. If Clindamycin or Erythromycin is the drug of choice, notify the laboratory within 7 days to request susceptibility testing.        Gram Stain Many (4+) WBCs per low power field      Rare (1+) Gram negative bacilli    Anaerobic Culture - Surgical Site, Knee, Left [[id number]]  (Normal) Collected: 02/25/25 0737    Lab Status: Preliminary result Specimen: Surgical Site from Knee, Left Updated: 02/28/25 0918     Anaerobic Culture No anaerobes isolated at 3 days    Wound Culture - Surgical Site, Knee, Left [[id number]]  (Abnormal) Collected: 02/25/25 0737    Lab Status: Final result Specimen: Surgical Site from Knee, Left Updated: 02/28/25 1041     Wound Culture Scant growth (1+) Streptococcus agalactiae (Group  B)     Comment:   This organism is considered to be universally susceptible to penicillin.  No further antibiotic testing will be performed. If Clindamycin or Erythromycin is the drug of choice, notify the laboratory within 7 days to request susceptibility testing.        Gram Stain Many (4+) WBCs per low power field      No organisms seen    MRSA Screen, PCR (Inpatient) - Swab, Nares [[id number]]  (Normal) Collected: 02/24/25 1407    Lab Status: Final result Specimen: Swab from Nares Updated: 02/24/25 1615     MRSA PCR No MRSA Detected    Narrative:      The negative predictive value of this diagnostic test is high and should only be used to consider de-escalating anti-MRSA therapy. A positive result may indicate colonization with MRSA and must be correlated clinically.    COVID-19 and FLU A/B PCR, 1 HR TAT - Swab, Nasopharynx [[id number]]  (Normal) Collected: 02/24/25 1128    Lab Status: Final result Specimen: Swab from Nasopharynx Updated: 02/24/25 1154     COVID19 Not Detected     Influenza A PCR Not Detected     Influenza B PCR Not Detected    Narrative:      Fact sheet for providers: https://www.fda.gov/media/380544/download    Fact sheet for patients: https://www.fda.gov/media/307537/download    Test performed by PCR.    Body Fluid Culture - Synovial Fluid, Knee, Left [[id number]]  (Abnormal) Collected: 02/24/25 1000    Lab Status: Final result Specimen: Synovial Fluid from Knee, Left Updated: 02/27/25 0746     Body Fluid Culture Light growth (2+) Streptococcus agalactiae (Group B)     Comment:   This organism is considered to be universally susceptible to penicillin.  No further antibiotic testing will be performed.        Gram Stain Many (4+) WBCs per low power field      No organisms seen    Blood Culture - Blood, Arm, Left [[id number]]  (Normal) Collected: 02/24/25 0820    Lab Status: Preliminary result Specimen: Blood from Arm, Left Updated: 02/28/25 0830     Blood Culture No growth at 4 days    Narrative:       Less than seven (7) mL's of blood was collected.  Insufficient quantity may yield false negative results.    Blood Culture - Blood, Arm Left [[id number]]  (Normal) Collected: 02/24/25 0723    Lab Status: Preliminary result Specimen: Blood from Arm, Left Updated: 02/28/25 0730     Blood Culture No growth at 4 days            Medication Review:       Schedule Meds  [Held by provider] aspirin, 81 mg, Oral, BID  cefTRIAXone, 2,000 mg, Intravenous, Q24H  dicyclomine, 10 mg, Oral, TID  [Held by provider] gabapentin, 300 mg, Oral, TID  sodium chloride, 10 mL, Intravenous, Q12H  tamsulosin, 0.4 mg, Oral, Q PM  vitamin D, 50,000 Units, Oral, Q7 Days        Infusion Meds       PRN Meds    acetaminophen **OR** acetaminophen **OR** acetaminophen    senna-docusate sodium **AND** polyethylene glycol **AND** bisacodyl **AND** bisacodyl    Calcium Replacement - Follow Nurse / BPA Driven Protocol    diphenhydrAMINE-zinc acetate    hydrALAZINE    HYDROcodone-acetaminophen    HYDROmorphone    hydrOXYzine    Magnesium Standard Dose Replacement - Follow Nurse / BPA Driven Protocol    melatonin    nitroglycerin    ondansetron ODT **OR** ondansetron    Phosphorus Replacement - Follow Nurse / BPA Driven Protocol    Potassium Replacement - Follow Nurse / BPA Driven Protocol    sodium chloride    sodium chloride        Assessment & Plan       Antimicrobial Therapy   1.  IV ceftriaxone     3.        4.        5.            Assessment     Left total knee infection.  Patient status post aspiration in the ED that showed over 93,000 total nucleated cells and purulence was seen both at the prepatellar bursa and deeper into the knee during an incision and drainage and liner exchange on 2/25/2025.  Aspiration culture and interoperative cultures are growing group B streptococcus     History of total left knee replacement in 2008.  Status post left knee arthroplasty revision and liner exchange on 1/29/2025 due to pain    Macular papular rash on  patient's back that he states has been there for approximately a week.  Does not appear to be infectious in nature     Plan       Start IV ceftriaxone 2 g daily for 6 weeks.  Last day will be April 7, 2025.  Patient will need a PICC line before discharge-please remove when IV antibiotics are completed.  Standard weekly PICC line care  Weekly labs CBC, creatinine and sed rate x 6 weeks  Continue supportive care  Okay to discharge from Infectious Disease standpoint  Not much more to add from infectious disease standpoint-we will sign off at this time-please call with any questions.    Please fax all post discharge lab results, imaging studies and correspondence to this fax number [phone]  For any question or concern please contact our service number [phone]        Cici Payton, GONZALO  02/28/25  14:27 EST    Note is dictated utilizing voice recognition software/Dragon

## 2025-02-28 NOTE — CASE MANAGEMENT/SOCIAL WORK
Continued Stay Note   Carlito     Patient Name: Juan Marley  MRN: [id number]  Today's Date: 2/28/2025    Admit Date: 2/24/2025    Plan: Referral to [facility] and [facility] pending acceptance.  Will need precert. PASRR approved.   Discharge Plan       Row Name 02/28/25 1612       Plan    Plan Referral to [facility] and [facility] pending acceptance.  Will need precert. PASRR approved.    Patient/Family in Agreement with Plan yes    Plan Comments DC barriers: pod#3 left knee polyethylene insert exchange and washout. , IV antibiotics, ( per ID will need 6 weeks of IV antibiotics) pending intra operative cultures  Spoke with patient and daughter regarding choices for snf.  Choices: 1. [facility] 2. [facility].  Referral sent to [facility] and [facility] pending acceptance.  Will need precert.  PASRR  approved.                                                 Expected Discharge Date and Time       Expected Discharge Date Expected Discharge Time    Mar 2, 2025           Susy Cardenas RN    SIPS 1  [email or web address]  Office [phone]  Cell [phone]

## 2025-02-28 NOTE — THERAPY TREATMENT NOTE
"Subjective: Pt agreeable to therapeutic plan of care.     Objective:     Precautions - none    Bed mobility - N/A or Not attempted. Already up in chair.   Transfers - CGA sit to/from stand from recliner. Cues for hand placement and initial set up.   Ambulation - pre gait training with emphasis on upright standing posture with walker, progressing gradually to heel down, and maintaining foot flat with L glute and quad contraction. L knee remains flexed during activity but pt is able to get eventually get foot flat and glute/quad contraction improves with repetition and tactile cues.     Therapeutic Exercise - 10 Reps B LE AROM supported sitting / chair - AP, GS, QS, LAQ, AROM/AAROM heel slides with hold in flexion for improved knee ROM.     LLE WBAT    L knee AROM 0-10-80    Vitals: WNL    Pain: 8 VAS Location: L knee   Intervention for pain: Repositioned, Increased Activity, and Therapeutic Presence, cold pack applied, RN present to give meds.     Education: Provided education on the importance of mobility in the acute care setting, Verbal/Tactile Cues, Transfer Training, Gait Training, WB'ing status, and HEP    Assessment: Juan Marley presents with steady progress toward goals, with less assist needed for sit to/from stand today. Pt with ongoing impairment in LLE positioning in standing and LLE WB tolerance. Pre gait activity completed for improved technique and improved pt confidence; pt tolerates well. Pt with strength, ROM, and functional mobility impairments which indicate the need for skilled intervention. Tolerating session today without incident. Will continue to follow and progress as tolerated.     Plan/Recommendations:   If medically appropriate, Moderate Intensity Therapy recommended post-acute care. This is recommended as therapy feels the patient would require 3-4 days per week and wouldn't tolerate \"3 hour daily\" rehab intensity. SNF would be the preferred choice. If the patient does not agree to SNF, " arrange HH or OP depending on home bound status. If patient is medically complex, consider LTACH. Pt requires no DME at discharge.     Pt desires Skilled Rehab placement at discharge. Pt cooperative; agreeable to therapeutic recommendations and plan of care.         Post-Tx Position: Up in Chair, Alarms activated, and Call light and personal items within reach  PPE: gloves        Therapy Charges for Today       Code Description Service Date Service Provider Modifiers Qty    79305902519 HC PT THERAPEUTIC ACT EA 15 MIN 2/28/2025 Maria M Rossi, PT GP 1    64231798773 HC PT THER PROC EA 15 MIN 2/28/2025 Maria M Rossi, PT GP 1    12829713430 HC GAIT TRAINING EA 15 MIN 2/28/2025 Maria M Rossi, PT GP 1           PT Charges       Row Name 02/28/25 1213             Time Calculation    Start Time 0915  -      Stop Time 0944  -      Time Calculation (min) 29 min  -      PT Non-Billable Time (min) 0 min  -      PT Received On 02/28/25  -      PT - Next Appointment 03/01/25  -         Time Calculation- PT    Total Timed Code Minutes- PT 29 minute(s)  -                User Key  (r) = Recorded By, (t) = Taken By, (c) = Cosigned By      Initials Name Provider Type     Maria M Rossi PT Physical Therapist

## 2025-02-28 NOTE — PLAN OF CARE
Goal Outcome Evaluation:      A&ox4, call light in reach. Able to communicate needs. Vss.       Problem: Adult Inpatient Plan of Care  Goal: Plan of Care Review  Outcome: Progressing  Goal: Patient-Specific Goal (Individualized)  Outcome: Progressing  Goal: Absence of Hospital-Acquired Illness or Injury  Outcome: Progressing  Intervention: Identify and Manage Fall Risk  Description: Perform standard risk assessment on admission using a validated tool or comprehensive approach appropriate to the patient; reassess fall risk frequently, with change in status or transfer to another level of care.  Communicate risk to interprofessional healthcare team; ensure fall risk visible cue.  Determine need for increased observation, equipment and environmental modification, as well as use of supportive, nonskid footwear.  Adjust safety measures to individual needs and identified risk factors.  Reinforce the importance of active participation with fall risk prevention, safety, and physical activity with the patient and family.  Perform regular intentional rounding to assess need for position change, pain assessment and personal needs, including assistance with toileting.  Recent Flowsheet Documentation  Taken 2/28/2025 1600 by Krystal Melton RN  Safety Promotion/Fall Prevention: safety round/check completed  Taken 2/28/2025 1400 by Krystal Melton RN  Safety Promotion/Fall Prevention: safety round/check completed  Taken 2/28/2025 1200 by Krystal Melton RN  Safety Promotion/Fall Prevention: safety round/check completed  Taken 2/28/2025 1000 by Krystal Melton RN  Safety Promotion/Fall Prevention: safety round/check completed  Taken 2/28/2025 0753 by Krystal Melton, RN  Safety Promotion/Fall Prevention: safety round/check completed  Intervention: Prevent Skin Injury  Description: Perform a screening for skin injury risk, such as pressure or moisture-associated skin damage on admission and at regular intervals throughout  hospital stay.  Keep all areas of skin (especially folds) clean and dry.  Maintain adequate skin hydration.  Relieve and redistribute pressure and protect bony prominences and skin at risk for injury; implement measures based on patient-specific risk factors.  Match turning and repositioning schedule to clinical condition.  Encourage weight shift frequently; assist with reposition if unable to complete independently.  Float heels off bed; avoid pressure on the Achilles tendon.  Keep skin free from extended contact with medical devices.  Optimize nutrition and hydration.  Encourage functional activity and mobility, as early as tolerated.  Use aids (e.g., slide boards, mechanical lift) during transfer.  Recent Flowsheet Documentation  Taken 2/28/2025 1400 by Krystal Melton RN  Body Position:   position changed independently   position maintained  Taken 2/28/2025 0753 by Krystal Melton RN  Body Position: position changed independently  Intervention: Prevent and Manage VTE (Venous Thromboembolism) Risk  Description: Assess for VTE (venous thromboembolism) risk.  Promote early mobilization; encourage both active and passive leg exercises, if unable to ambulate.  Initiate and maintain compression or other therapy, as indicated, based on identified risk in accordance with organizational protocol and provider order.  Recognize the patient's individual risk for bleeding before initiating pharmacologic thromboprophylaxis.  Recent Flowsheet Documentation  Taken 2/28/2025 0753 by Krystal Melton RN  VTE Prevention/Management: (educated)   bilateral   SCDs (sequential compression devices) off   patient refused intervention  Goal: Optimal Comfort and Wellbeing  Outcome: Progressing  Intervention: Provide Person-Centered Care  Description: Use a family-focused approach to care; encourage support system presence and participation.  Develop trust and rapport by proactively providing information, encouraging questions,  addressing concerns and offering reassurance.  Acknowledge emotional response to hospitalization.  Recognize and utilize personal coping strategies and strengths; develop goals via shared decision-making.  Honor spiritual and cultural preferences.  Recent Flowsheet Documentation  Taken 2/28/2025 3503 by Krystal Melton RN  Trust Relationship/Rapport:   care explained   questions answered   reassurance provided   thoughts/feelings acknowledged  Goal: Readiness for Transition of Care  Outcome: Progressing     Problem: Skin Injury Risk Increased  Goal: Skin Health and Integrity  Outcome: Progressing  Intervention: Optimize Skin Protection  Description: Perform a full pressure injury risk assessment, as indicated by screening, upon admission to care unit.  Reassess skin (full inspection and injury risk, including skin temperature, consistency and color) frequently (e.g., scheduled interval, with change in condition) to provide optimal early detection and prevention.  Maintain adequate tissue perfusion (e.g., encourage fluid balance; avoid crossing legs, constrictive clothing or devices) to promote tissue oxygenation.  Maintain head of bed at lowest degree of elevation tolerated, considering medical condition and other restrictions. Use positioning supports to prevent sliding and friction. Consider low friction textiles.  Avoid positioning onto an area that remains reddened or on bony prominences.  Minimize incontinence and moisture (e.g., toileting schedule; moisture-wicking pad, diaper or incontinence collection device; skin moisture barrier).  Cleanse skin promptly and gently, when soiled, utilizing a pH-balanced cleanser.  Relieve and redistribute pressure (e.g., scheduled position changes, weight shifts, use of support surface, medical device repositioning, protective dressing application, use of positioning device, microclimate control, use of pressure-injury-monitor  Encourage increased activity, such as sitting  in a chair at the bedside or early mobilization, when able to tolerate. Avoid prolonged sitting.  Recent Flowsheet Documentation  Taken 2/28/2025 1200 by Krystal Melton RN  Pressure Reduction Techniques: frequent weight shift encouraged  Pressure Reduction Devices: pressure-redistributing mattress utilized  Taken 2/28/2025 0753 by Krystal Melton, RN  Activity Management: activity encouraged  Pressure Reduction Techniques: frequent weight shift encouraged  Head of Bed (HOB) Positioning: HOB elevated  Pressure Reduction Devices: pressure-redistributing mattress utilized     Problem: Surgery Nonspecified  Goal: Absence of Bleeding  Outcome: Progressing  Goal: Effective Bowel Elimination  Outcome: Progressing  Goal: Fluid and Electrolyte Balance  Outcome: Progressing  Goal: Absence of Infection Signs and Symptoms  Outcome: Progressing  Goal: Optimal Pain Control and Function  Outcome: Progressing  Intervention: Prevent or Manage Pain  Description: Evaluate and assist with psychosocial, cultural and spiritual factors impacting pain.  Set pain management goals; mutually determine pain management plan and review plan regularly.  Use a consistent, validated tool for pain assessment including function and quality of life; evaluate pain level, effect of treatment and patient's response at regular intervals.  Match pharmacologic analgesia to severity and type of pain mechanism; evaluate risk for opioid use and dependence; consider medication rotation and multimodal approach. Titrate to patient response.  Provide around-the-clock analgesic agents and nonpharmacologic therapies to keep pain levels in control.  Manage medication-induced adverse events and side effects.  Provide multimodal interventions such as physical activity, therapeutic exercise, yoga, TENS (transcutaneous electrical nerve stimulation), and manual therapy; consider addition of complementary or alternative therapies.  Consider and address emotional response  to pain.  Modify pain perception by using techniques, such as distraction, mindfulness, guided imagery, meditation or music.  Recent Flowsheet Documentation  Taken 2/28/2025 6910 by Krystal Melton, RN  Diversional Activities: television  Goal: Effective Urinary Elimination  Outcome: Progressing

## 2025-02-28 NOTE — PLAN OF CARE
Goal Outcome Evaluation:  Plan of Care Reviewed With: patient        Progress: improving  Outcome Evaluation: Patient up with assist x 2, walker and gait belt. Patient without complaints of pain at this time, continues hemovac drain, IV antibiotics VSS, no concerns.

## 2025-02-28 NOTE — PROGRESS NOTES
"NEPHROLOGY PROGRESS NOTE------[facility]    [facility]  [phone]  Cedric Jacobs MD      Patient Care Team:  Rolf Harvey MD as PCP - General (Family Medicine)  Cedric Jacobs MD as Consulting Physician (Nephrology)      Provider:  Cedric Jacobs MD  Patient Name: Juan Marley  :  1940    SUBJECTIVE:  F/U NATALIO/CKD  No chest pain or SOA    Medication:  [Held by provider] aspirin, 81 mg, Oral, BID  cefTRIAXone, 2,000 mg, Intravenous, Q24H  dicyclomine, 10 mg, Oral, TID  [Held by provider] gabapentin, 300 mg, Oral, TID  sodium chloride, 10 mL, Intravenous, Q12H  tamsulosin, 0.4 mg, Oral, Q PM             OBJECTIVE    Vital Sign Min/Max for last 24 hours  Temp  Min: 97.9 °F (36.6 °C)  Max: 97.9 °F (36.6 °C)   BP  Min: 138/66  Max: 158/66   Pulse  Min: 66  Max: 67   Resp  Min: 15  Max: 17   SpO2  Min: 92 %  Max: 95 %   No data recorded   No data recorded     Flowsheet Rows      Flowsheet Row First Filed Value   Admission Height 177.8 cm (70\") Documented at 2025 0617   Admission Weight 87 kg (191 lb 12.8 oz) Documented at 2025 0617            No intake/output data recorded.  I/O last 3 completed shifts:  In: 840 [P.O.:840]  Out: 615 [Urine:550; Drains:65]    Physical Exam:  General Appearance: alert, appears stated age and cooperative  Head: normocephalic, without obvious abnormality and atraumatic  Eyes: conjunctivae and sclerae normal and no icterus  Neck: supple and no JVD  Lungs: clear to auscultation and respirations regular  Heart: regular rhythm & normal rate and normal S1, S2  Chest: Wall no abnormalities observed  Abdomen: normal bowel sounds and soft, nontender  Extremities: moves extremities well, no edema, no cyanosis and no redness  Skin: no bleeding, bruising or rash, turgor normal, color normal and no lesions noted  Neurologic: Alert, and oriented. No focal deficits    Labs:    WBC WBC   Date Value Ref Range Status " "  02/27/2025 6.86 3.40 - 10.80 10*3/mm3 Final   02/26/2025 8.39 3.40 - 10.80 10*3/mm3 Final   02/26/2025 9.24 3.40 - 10.80 10*3/mm3 Final      HGB Hemoglobin   Date Value Ref Range Status   02/27/2025 10.9 (L) 13.0 - 17.7 g/dL Final   02/26/2025 10.8 (L) 13.0 - 17.7 g/dL Final   02/26/2025 11.7 (L) 13.0 - 17.7 g/dL Final      HCT Hematocrit   Date Value Ref Range Status   02/27/2025 33.1 (L) 37.5 - 51.0 % Final   02/26/2025 32.6 (L) 37.5 - 51.0 % Final   02/26/2025 35.8 (L) 37.5 - 51.0 % Final      Platelets No results found for: \"LABPLAT\"   MCV MCV   Date Value Ref Range Status   02/27/2025 91.9 79.0 - 97.0 fL Final   02/26/2025 91.6 79.0 - 97.0 fL Final   02/26/2025 93.7 79.0 - 97.0 fL Final          Sodium Sodium   Date Value Ref Range Status   02/27/2025 142 136 - 145 mmol/L Final   02/26/2025 137 136 - 145 mmol/L Final   02/26/2025 138 136 - 145 mmol/L Final      Potassium Potassium   Date Value Ref Range Status   02/27/2025 3.9 3.5 - 5.2 mmol/L Final   02/26/2025 4.2 3.5 - 5.2 mmol/L Final   02/26/2025 4.0 3.5 - 5.2 mmol/L Final      Chloride Chloride   Date Value Ref Range Status   02/27/2025 110 (H) 98 - 107 mmol/L Final   02/26/2025 107 98 - 107 mmol/L Final   02/26/2025 106 98 - 107 mmol/L Final      CO2 CO2   Date Value Ref Range Status   02/27/2025 21.6 (L) 22.0 - 29.0 mmol/L Final   02/26/2025 20.6 (L) 22.0 - 29.0 mmol/L Final   02/26/2025 22.5 22.0 - 29.0 mmol/L Final      BUN BUN   Date Value Ref Range Status   02/27/2025 17 8 - 23 mg/dL Final   02/26/2025 22 8 - 23 mg/dL Final   02/26/2025 32 (H) 8 - 23 mg/dL Final      Creatinine Creatinine   Date Value Ref Range Status   02/27/2025 0.97 0.76 - 1.27 mg/dL Final   02/26/2025 1.20 0.76 - 1.27 mg/dL Final   02/26/2025 1.21 0.76 - 1.27 mg/dL Final      Calcium Calcium   Date Value Ref Range Status   02/27/2025 7.9 (L) 8.6 - 10.5 mg/dL Final   02/26/2025 8.0 (L) 8.6 - 10.5 mg/dL Final   02/26/2025 8.0 (L) 8.6 - 10.5 mg/dL Final      PO4 No components " "found for: \"PO4\"   Albumin Albumin   Date Value Ref Range Status   02/27/2025 2.7 (L) 3.5 - 5.2 g/dL Final   02/26/2025 3.0 (L) 3.5 - 5.2 g/dL Final   02/26/2025 3.1 (L) 3.5 - 5.2 g/dL Final      Magnesium No results found for: \"MG\"     Uric Acid No components found for: \"URIC ACID\"     Imaging Results (Last 72 Hours)       Procedure Component Value Units Date/Time    CT Head Without Contrast [[id number]] Collected: 02/25/25 1233     Updated: 02/25/25 1237    Narrative:      CT HEAD WO CONTRAST    Date of Exam: 2/25/2025 12:22 PM EST    Indication: confusion.    Comparison: None available.    Technique: Axial CT images were obtained of the head without contrast administration.  Coronal reconstructions were performed.  Automated exposure control and iterative reconstruction methods were used.      Findings:  Negative for large territory infarct, acute intracranial hemorrhage, large mass lesion, hydrocephalus or midline shift. There is mild global parenchymal volume loss for age. Minimal periventricular hypodensity most commonly reflecting sequelae of chronic   microvascular ischemic change. Vascular calcifications. No large extra-axial fluid collection. Leftward deviated septum and degenerative spurring. No obstructive sinus disease or layering fluid. No large mastoid effusion. Calvarium grossly unremarkable.      Impression:      Impression:  No acute intracranial findings by CT.    Mild global parenchymal volume loss and presumed sequela of chronic microvascular ischemic change.      Electronically Signed: Erick Gilmore MD    2/25/2025 12:35 PM EST    Workstation ID: [id number]    US Liver [[id number]] Collected: 02/25/25 1229     Updated: 02/25/25 1233    Narrative:      US LIVER    Date of Exam: 2/25/2025 12:11 PM EST    Indication: abnormal lft's, confusion.    Comparison: No comparisons available.    Technique: Grayscale and color Doppler ultrasound evaluation of the right upper quadrant was " performed.      Findings:  Liver has homogenous echogenicity and normal echotexture.  No focal hepatic lesion. Hepatopetal flow of the main portal vein. No visualized ascites.    Numerous echogenic shadowing gallstones. Gallbladder is distended without significant wall thickening or pericholecystic fluid.    Mild intrahepatic duct dilation. Dilated extrahepatic biliary tree, common bile duct measures up to 1.3 cm abnormal for age.    The visualized portions of the head and body of the pancreas are grossly unremarkable. The pancreatic tail is not seen due to bowel gas.    Homogeneous cortical echotexture of the right kidney. No hydronephrosis, shadowing echogenicities or solid masses.          Impression:      1. Cholelithiasis. Distended gallbladder without wall thickening or pericholecystic fluid. Correlate for clinical signs of acute cholecystitis.  2. Dilated biliary tree for age. If there is concern for choledocholithiasis or other downstream obstruction recommend MRCP.      Electronically Signed: Erick Gilmore MD    2/25/2025 12:31 PM EST    Workstation ID: [id number]            Results for orders placed during the hospital encounter of 02/24/25    XR Knee 1 or 2 View Left    Narrative  XR KNEE 1 OR 2 VW LEFT    Date of Exam: 2/25/2025 9:26 AM EST    Indication: post op    Comparison: Left knee radiograph 2/24/2025    Findings:  Total knee arthroplasty in anatomic alignment. Small less conspicuous joint effusion as well as decreasing soft tissue edema most significant along the anterior aspect of the knee joint. No evidence of loosening, hardware fracture or periprosthetic  fracture. Surgical drain noted.    Impression  Impression:  Decreasing soft tissue swelling and joint fluid. Radiographically uncomplicated total arthroplasty in anatomic alignment.      Electronically Signed: Erick Gilmore MD  2/25/2025 9:36 AM EST  Workstation ID: [id number]      XR Chest 1 View    Narrative  XR CHEST 1 VW    Date of Exam:  2/25/2025 5:25 AM EST    Indication: pre op    Comparison: 9/3/2015    Findings:  Heart size normal. Mild left basilar airspace disease favor atelectasis. The right lung is clear. No pneumothorax. No significant effusion. Osseous structures appear intact.    Impression  Impression:  Mild left basilar airspace disease favor atelectasis.          Electronically Signed: Reynold Marks MD  2/25/2025 7:02 AM EST  Workstation ID: [id number]      XR Knee 4+ View Left    Narrative  XR KNEE 4+ VW LEFT    Date of Exam: 2/24/2025 7:12 AM EST    Indication: pain, redness, swelling s/p TKR revision    Comparison: January 17, 2025, January 29, 2025    Findings:  Status post knee arthroplasty. Hardware components appear in stable and expected positions. No definite acute hardware complication.    There appears to be a small to moderate knee effusion. There is anterior soft tissue prominence overlying the patella and extensor tendons. There also appears to be diffuse subcutaneous edema. No definite acute osseous abnormality.    Impression  Impression:  1.Status post knee arthroplasty.  No definite acute hardware or osseous complication.  2.Nonspecific small to moderate knee effusion, subcutaneous edema, and anterior soft tissue prominence. Joint or soft tissue infection should be considered based upon patient's reported symptoms. Correlate with clinical findings, lab values, and consider  joint aspiration.          Electronically Signed: Adrien Alvarenga  2/24/2025 7:23 AM EST  Workstation ID: [id number]      Results for orders placed during the hospital encounter of 02/24/25    Duplex Venous Lower Extremity - Left CAR    Interpretation Summary    Normal left lower extremity venous duplex scan.        ASSESSMENT / PLAN      Pyogenic arthritis of left knee joint    S/P revision of total knee, left    Knee pain    Essential tremor    Essential hypertension    Dehydration    NATALIO (acute kidney injury)    Fever    Generalized weakness     Confusion    Abnormal liver function test    NATALIO-likely prerenal in setting of hypotension/diuretic and or ACE inhibitor use and with decreased p.o. 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

## 2025-03-01 LAB
ALBUMIN SERPL-MCNC: 3 G/DL (ref 3.5–5.2)
ANION GAP SERPL CALCULATED.3IONS-SCNC: 10.5 MMOL/L (ref 5–15)
BACTERIA SPEC AEROBE CULT: NORMAL
BACTERIA SPEC AEROBE CULT: NORMAL
BUN SERPL-MCNC: 14 MG/DL (ref 8–23)
BUN/CREAT SERPL: 14 (ref 7–25)
CALCIUM SPEC-SCNC: 8.4 MG/DL (ref 8.6–10.5)
CHLORIDE SERPL-SCNC: 108 MMOL/L (ref 98–107)
CO2 SERPL-SCNC: 21.5 MMOL/L (ref 22–29)
CREAT SERPL-MCNC: 1 MG/DL (ref 0.76–1.27)
EGFRCR SERPLBLD CKD-EPI 2021: 75.1 ML/MIN/1.73
GLUCOSE SERPL-MCNC: 107 MG/DL (ref 65–99)
PHOSPHATE SERPL-MCNC: 3 MG/DL (ref 2.5–4.5)
POTASSIUM SERPL-SCNC: 4.1 MMOL/L (ref 3.5–5.2)
SODIUM SERPL-SCNC: 140 MMOL/L (ref 136–145)

## 2025-03-01 PROCEDURE — 97110 THERAPEUTIC EXERCISES: CPT

## 2025-03-01 PROCEDURE — 25010000002 CEFTRIAXONE PER 250 MG: Performed by: INTERNAL MEDICINE

## 2025-03-01 PROCEDURE — 97530 THERAPEUTIC ACTIVITIES: CPT

## 2025-03-01 PROCEDURE — 97116 GAIT TRAINING THERAPY: CPT

## 2025-03-01 PROCEDURE — 25010000002 CALCIUM GLUCONATE-NACL 1-0.675 GM/50ML-% SOLUTION: Performed by: INTERNAL MEDICINE

## 2025-03-01 PROCEDURE — 25010000002 HYDRALAZINE PER 20 MG: Performed by: ORTHOPAEDIC SURGERY

## 2025-03-01 RX ORDER — SODIUM BICARBONATE 650 MG/1
1300 TABLET ORAL EVERY 4 HOURS
Status: COMPLETED | OUTPATIENT
Start: 2025-03-01 | End: 2025-03-01

## 2025-03-01 RX ORDER — CALCIUM GLUCONATE 20 MG/ML
1000 INJECTION, SOLUTION INTRAVENOUS ONCE
Status: COMPLETED | OUTPATIENT
Start: 2025-03-01 | End: 2025-03-01

## 2025-03-01 RX ADMIN — SODIUM BICARBONATE 1300 MG: 650 TABLET ORAL at 15:01

## 2025-03-01 RX ADMIN — TAMSULOSIN HYDROCHLORIDE 0.4 MG: 0.4 CAPSULE ORAL at 16:36

## 2025-03-01 RX ADMIN — CALCIUM GLUCONATE 1000 MG: 20 INJECTION, SOLUTION INTRAVENOUS at 12:14

## 2025-03-01 RX ADMIN — SODIUM BICARBONATE 1300 MG: 650 TABLET ORAL at 10:30

## 2025-03-01 RX ADMIN — DICYCLOMINE HYDROCHLORIDE 10 MG: 10 CAPSULE ORAL at 16:36

## 2025-03-01 RX ADMIN — HYDROCODONE BITARTRATE AND ACETAMINOPHEN 1 TABLET: 7.5; 325 TABLET ORAL at 10:30

## 2025-03-01 RX ADMIN — Medication 1000 MG: at 12:14

## 2025-03-01 RX ADMIN — Medication 10 ML: at 12:14

## 2025-03-01 RX ADMIN — CEFTRIAXONE 2000 MG: 2 INJECTION, POWDER, FOR SOLUTION INTRAMUSCULAR; INTRAVENOUS at 15:01

## 2025-03-01 RX ADMIN — HYDRALAZINE HYDROCHLORIDE 10 MG: 20 INJECTION INTRAMUSCULAR; INTRAVENOUS at 05:24

## 2025-03-01 RX ADMIN — DICYCLOMINE HYDROCHLORIDE 10 MG: 10 CAPSULE ORAL at 20:18

## 2025-03-01 RX ADMIN — DICYCLOMINE HYDROCHLORIDE 10 MG: 10 CAPSULE ORAL at 09:43

## 2025-03-01 RX ADMIN — Medication 10 ML: at 20:18

## 2025-03-01 RX ADMIN — ACETAMINOPHEN 650 MG: 325 TABLET, FILM COATED ORAL at 20:18

## 2025-03-01 NOTE — PROGRESS NOTES
"NEPHROLOGY PROGRESS NOTE------[facility]    [facility]  [phone]  Nic Mccray MD      Patient Care Team:  Rolf Harvey MD as PCP - General (Family Medicine)  Cedric Jacobs MD as Consulting Physician (Nephrology)      Provider:  Nic Mccray MD  Patient Name: Juan Marley  :  1940    SUBJECTIVE:    F/U ARF/NATALIO/CRF/CKD    No overnight events. No SOB, CP, dysuria.       Medication:  [Held by provider] aspirin, 81 mg, Oral, BID  cefTRIAXone, 2,000 mg, Intravenous, Q24H  dicyclomine, 10 mg, Oral, TID  [Held by provider] gabapentin, 300 mg, Oral, TID  sodium chloride, 10 mL, Intravenous, Q12H  tamsulosin, 0.4 mg, Oral, Q PM  vitamin D, 50,000 Units, Oral, Q7 Days             OBJECTIVE    Vital Sign Min/Max for last 24 hours  Temp  Min: 97.9 °F (36.6 °C)  Max: 98.4 °F (36.9 °C)   BP  Min: 166/74  Max: 178/79   Pulse  Min: 60  Max: 65   Resp  Min: 16  Max: 20   SpO2  Min: 97 %  Max: 98 %   No data recorded   No data recorded     Flowsheet Rows      Flowsheet Row First Filed Value   Admission Height 177.8 cm (70\") Documented at 2025 0617   Admission Weight 87 kg (191 lb 12.8 oz) Documented at 2025 0617            No intake/output data recorded.  I/O last 3 completed shifts:  In: 720 [P.O.:720]  Out: -     Physical Exam:  General Appearance: alert, appears stated age and cooperative  Head: normocephalic, without obvious abnormality and atraumatic  Eyes: conjunctivae and sclerae normal and no icterus  Neck: supple and no JVD  Lungs: clear to auscultation and respirations regular  Heart: regular rhythm & normal rate and normal S1, S2 +NICOLE  Chest: Wall no abnormalities observed  Abdomen: normal bowel sounds and soft, nontender  Extremities: moves extremities well, no edema, no cyanosis and no redness  Skin: no bleeding, bruising or rash, turgor normal, color normal and no lesions noted  Neurologic: Alert, and oriented. " "No focal deficits    Labs:    WBC WBC   Date Value Ref Range Status   02/27/2025 6.86 3.40 - 10.80 10*3/mm3 Final   02/26/2025 8.39 3.40 - 10.80 10*3/mm3 Final      HGB Hemoglobin   Date Value Ref Range Status   02/27/2025 10.9 (L) 13.0 - 17.7 g/dL Final   02/26/2025 10.8 (L) 13.0 - 17.7 g/dL Final      HCT Hematocrit   Date Value Ref Range Status   02/27/2025 33.1 (L) 37.5 - 51.0 % Final   02/26/2025 32.6 (L) 37.5 - 51.0 % Final      Platelets No results found for: \"LABPLAT\"   MCV MCV   Date Value Ref Range Status   02/27/2025 91.9 79.0 - 97.0 fL Final   02/26/2025 91.6 79.0 - 97.0 fL Final          Sodium Sodium   Date Value Ref Range Status   02/28/2025 140 136 - 145 mmol/L Final   02/27/2025 142 136 - 145 mmol/L Final   02/26/2025 137 136 - 145 mmol/L Final      Potassium Potassium   Date Value Ref Range Status   02/28/2025 4.1 3.5 - 5.2 mmol/L Final   02/27/2025 3.9 3.5 - 5.2 mmol/L Final   02/26/2025 4.2 3.5 - 5.2 mmol/L Final      Chloride Chloride   Date Value Ref Range Status   02/28/2025 108 (H) 98 - 107 mmol/L Final   02/27/2025 110 (H) 98 - 107 mmol/L Final   02/26/2025 107 98 - 107 mmol/L Final      CO2 CO2   Date Value Ref Range Status   02/28/2025 21.5 (L) 22.0 - 29.0 mmol/L Final   02/27/2025 21.6 (L) 22.0 - 29.0 mmol/L Final   02/26/2025 20.6 (L) 22.0 - 29.0 mmol/L Final      BUN BUN   Date Value Ref Range Status   02/28/2025 14 8 - 23 mg/dL Final   02/27/2025 17 8 - 23 mg/dL Final   02/26/2025 22 8 - 23 mg/dL Final      Creatinine Creatinine   Date Value Ref Range Status   02/28/2025 1.00 0.76 - 1.27 mg/dL Final   02/27/2025 0.97 0.76 - 1.27 mg/dL Final   02/26/2025 1.20 0.76 - 1.27 mg/dL Final      Calcium Calcium   Date Value Ref Range Status   02/28/2025 8.4 (L) 8.6 - 10.5 mg/dL Final   02/27/2025 7.9 (L) 8.6 - 10.5 mg/dL Final   02/26/2025 8.0 (L) 8.6 - 10.5 mg/dL Final      PO4 No components found for: \"PO4\"   Albumin Albumin   Date Value Ref Range Status   02/28/2025 3.0 (L) 3.5 - 5.2 g/dL " "Final   02/27/2025 2.7 (L) 3.5 - 5.2 g/dL Final   02/26/2025 3.0 (L) 3.5 - 5.2 g/dL Final      Magnesium No results found for: \"MG\"     Uric Acid No components found for: \"URIC ACID\"     Imaging Results (Last 72 Hours)       ** No results found for the last 72 hours. **            Results for orders placed during the hospital encounter of 02/24/25    XR Knee 1 or 2 View Left    Narrative  XR KNEE 1 OR 2 VW LEFT    Date of Exam: 2/25/2025 9:26 AM EST    Indication: post op    Comparison: Left knee radiograph 2/24/2025    Findings:  Total knee arthroplasty in anatomic alignment. Small less conspicuous joint effusion as well as decreasing soft tissue edema most significant along the anterior aspect of the knee joint. No evidence of loosening, hardware fracture or periprosthetic  fracture. Surgical drain noted.    Impression  Impression:  Decreasing soft tissue swelling and joint fluid. Radiographically uncomplicated total arthroplasty in anatomic alignment.      Electronically Signed: Erick Gilmore MD  2/25/2025 9:36 AM EST  Workstation ID: [id number]      XR Chest 1 View    Narrative  XR CHEST 1 VW    Date of Exam: 2/25/2025 5:25 AM EST    Indication: pre op    Comparison: 9/3/2015    Findings:  Heart size normal. Mild left basilar airspace disease favor atelectasis. The right lung is clear. No pneumothorax. No significant effusion. Osseous structures appear intact.    Impression  Impression:  Mild left basilar airspace disease favor atelectasis.          Electronically Signed: Reynold Marks MD  2/25/2025 7:02 AM EST  Workstation ID: [id number]      XR Knee 4+ View Left    Narrative  XR KNEE 4+ VW LEFT    Date of Exam: 2/24/2025 7:12 AM EST    Indication: pain, redness, swelling s/p TKR revision    Comparison: January 17, 2025, January 29, 2025    Findings:  Status post knee arthroplasty. Hardware components appear in stable and expected positions. No definite acute hardware complication.    There appears to be a small " to moderate knee effusion. There is anterior soft tissue prominence overlying the patella and extensor tendons. There also appears to be diffuse subcutaneous edema. No definite acute osseous abnormality.    Impression  Impression:  1.Status post knee arthroplasty.  No definite acute hardware or osseous complication.  2.Nonspecific small to moderate knee effusion, subcutaneous edema, and anterior soft tissue prominence. Joint or soft tissue infection should be considered based upon patient's reported symptoms. Correlate with clinical findings, lab values, and consider  joint aspiration.          Electronically Signed: Adrien Garibaysania  2/24/2025 7:23 AM EST  Workstation ID: [id number]      Results for orders placed during the hospital encounter of 02/24/25    Duplex Venous Lower Extremity - Left CAR    Interpretation Summary    Normal left lower extremity venous duplex scan.        ASSESSMENT / PLAN      Pyogenic arthritis of left knee joint    S/P revision of total knee, left    Knee pain    Essential tremor    Essential hypertension    Dehydration    NATALIO (acute kidney injury)    Fever    Generalized weakness    Confusion    Abnormal liver function test    ARF/ANTALIO/CRF/CKD-------Nonoliguric. ARF/NATALIO resolved. +ARF/NATALIO on top of known CRF/CKD STG 3A. CRF/CKD STG 3A secondary to HTN NS. +ARF/NATALIO secondary to prerenal state and concomitant diuretic/ACE-I use. Off of these.  No NSAIDs or IV dye.     2. HTN WITH CKD-----BP good. Avoid hypotension. No ACE-I/ARB/DRI    3. HYPOCALCEMIA------Replace IV    4. RECENT LEFT KNEE SURGERY WITH INFECTION    5. ANEMIA OF CKD------H/H sta ble    6. ACIDOSIS----Type 4 RTA. Give po NaHCO3 and follow    7. VITAMIN D DEFICIENCY      Nic Mccray MD  [facility]  [phone]  03/01/25  09:36 EST

## 2025-03-01 NOTE — PROGRESS NOTES
LOS: 5 days   Patient Care Team:  Rolf Harvey MD as PCP - General (Family Medicine)  Cedric Jacobs MD as Consulting Physician (Nephrology)    Subjective     Patient states pain and mobility are improving    Review of Systems   Constitutional:  Positive for activity change and fatigue.   HENT: Negative.     Respiratory: Negative.     Cardiovascular: Negative.    Gastrointestinal: Negative.    Genitourinary: Negative.    Musculoskeletal:  Positive for gait problem and joint swelling.   Neurological:  Positive for weakness.   Psychiatric/Behavioral: Negative.             Objective     Vital Signs  Temp:  [97.9 °F (36.6 °C)-98.4 °F (36.9 °C)] 98.1 °F (36.7 °C)  Heart Rate:  [60-65] 62  Resp:  [16-20] 16  BP: (166-178)/(74-79) 166/74      Physical Exam  Vitals reviewed.   Constitutional:       Appearance: He is not ill-appearing.   HENT:      Head: Normocephalic and atraumatic.      Right Ear: External ear normal.      Left Ear: External ear normal.      Nose: Nose normal.      Mouth/Throat:      Mouth: Mucous membranes are moist.   Eyes:      General:         Right eye: No discharge.         Left eye: No discharge.   Cardiovascular:      Rate and Rhythm: Normal rate and regular rhythm.      Pulses: Normal pulses.      Heart sounds: Normal heart sounds.   Pulmonary:      Effort: Pulmonary effort is normal.      Breath sounds: Normal breath sounds.   Abdominal:      General: Bowel sounds are normal.      Palpations: Abdomen is soft.   Musculoskeletal:         General: Normal range of motion.      Cervical back: Normal range of motion.   Skin:     General: Skin is warm and dry.   Neurological:      Mental Status: He is alert and oriented to person, place, and time.   Psychiatric:         Behavior: Behavior normal.              Results Review:    Lab Results (last 24 hours)       Procedure Component Value Units Date/Time    Blood Culture - Blood, Arm, Left [[id number]]  (Normal) Collected: 02/24/25 7520     Specimen: Blood from Arm, Left Updated: 03/01/25 0830     Blood Culture No growth at 5 days    Narrative:      Less than seven (7) mL's of blood was collected.  Insufficient quantity may yield false negative results.    Blood Culture - Blood, Arm, Left [[id number]]  (Normal) Collected: 02/24/25 0723    Specimen: Blood from Arm, Left Updated: 03/01/25 0730     Blood Culture No growth at 5 days    Renal Function Panel [[id number]]  (Abnormal) Collected: 02/28/25 2329    Specimen: Blood Updated: 03/01/25 0014     Glucose 107 mg/dL      BUN 14 mg/dL      Creatinine 1.00 mg/dL      Sodium 140 mmol/L      Potassium 4.1 mmol/L      Chloride 108 mmol/L      CO2 21.5 mmol/L      Calcium 8.4 mg/dL      Albumin 3.0 g/dL      Phosphorus 3.0 mg/dL      Anion Gap 10.5 mmol/L      BUN/Creatinine Ratio 14.0     eGFR 75.1 mL/min/1.73     Narrative:      GFR Categories in Chronic Kidney Disease (CKD)      GFR Category          GFR (mL/min/1.73)    Interpretation  G1                     90 or greater         Normal or high (1)  G2                      60-89                Mild decrease (1)  G3a                   45-59                Mild to moderate decrease  G3b                   30-44                Moderate to severe decrease  G4                    15-29                Severe decrease  G5                    14 or less           Kidney failure          (1)In the absence of evidence of kidney disease, neither GFR category G1 or G2 fulfill the criteria for CKD.    eGFR calculation 2021 CKD-EPI creatinine equation, which does not include race as a factor    Phosphorus [[id number]]  (Normal) Collected: 02/28/25 1053    Specimen: Blood from Hand, Left Updated: 02/28/25 1126     Phosphorus 2.5 mg/dL              Imaging Results (Last 24 Hours)       ** No results found for the last 24 hours. **                 I reviewed the patient's new clinical results.    Medication Review:   Scheduled Meds:[Held by provider] aspirin, 81 mg, Oral,  BID  calcium gluconate, 1,000 mg, Intravenous, Once  cefTRIAXone, 2,000 mg, Intravenous, Q24H  dicyclomine, 10 mg, Oral, TID  [Held by provider] gabapentin, 300 mg, Oral, TID  potassium phosphate (monobasic), 1,000 mg, Oral, Once  sodium bicarbonate, 1,300 mg, Oral, Q4H  sodium chloride, 10 mL, Intravenous, Q12H  tamsulosin, 0.4 mg, Oral, Q PM  vitamin D, 50,000 Units, Oral, Q7 Days      Continuous Infusions:   PRN Meds:.  acetaminophen **OR** acetaminophen **OR** acetaminophen    senna-docusate sodium **AND** polyethylene glycol **AND** bisacodyl **AND** bisacodyl    Calcium Replacement - Follow Nurse / BPA Driven Protocol    diphenhydrAMINE-zinc acetate    hydrALAZINE    HYDROcodone-acetaminophen    HYDROmorphone    hydrOXYzine    Magnesium Standard Dose Replacement - Follow Nurse / BPA Driven Protocol    melatonin    nitroglycerin    ondansetron ODT **OR** ondansetron    Phosphorus Replacement - Follow Nurse / BPA Driven Protocol    Potassium Replacement - Follow Nurse / BPA Driven Protocol    sodium chloride    sodium chloride     Interval History:    Assessment & Plan     Pyogenic arthritis of left knee joint  S/P revision of total knee, left  Knee pain  Essential tremor  Essential hypertension  Dehydration  NATALIO (acute kidney injury)  Fever  Generalized weakness  Confusion   Abnormal liver function test    Plan of care  Left total knee infection. Patient status post aspiration in the ED that showed over 93,000 total nucleated cells and purulence was seen both at the prepatellar bursa and deeper into the knee during an incision and drainage and liner exchange on 2/25/2025     Continue IV ceftriaxone 2 g daily for 6 weeks. Last day will be April 7, 2025.   Cultures grew group B streptococcus    LFT improving liver ultrasound unremarkable  Altered mental status resolved and r/t medication    Plan for disposition:SNF medically ready for dc    GONZALO Pineda  03/01/25  11:24 EST

## 2025-03-01 NOTE — PLAN OF CARE
Goal Outcome Evaluation:      C/o mild pain on incision site, tylenol given per request, denied any need for a higher pain med. Skin is warm to touch. Dressing dry and intact. Moves all extremities. Assist x 2 with gait belt and walker. Plan of care ongoing. Heart and oxygen monitor on. SCDs on.

## 2025-03-01 NOTE — THERAPY TREATMENT NOTE
"Subjective: Pt agreeable to therapeutic plan of care. Pt stated he has a L AFO at home and is going to wear it more to prevent falls.    Objective:     Precautions - falls, LLE WBAT    Bed mobility - N/A or Not attempted.  Transfers - CGA and with rolling walker  Ambulation - 50 feet CGA and with rolling walker    Therapeutic Exercise - 10 Reps L Lower Extremity AAROM /AROMsupported sitting / chair    Vitals: WNL    Pain:   Location: no pain at rest but some with exs only when trying to incr ROM  Intervention for pain: Repositioned, Increased Activity, and Therapeutic Presence    Education: Provided education on the importance of mobility in the acute care setting, Verbal/Tactile Cues, ADL training, Transfer Training, Gait Training, WB'ing status, and Post-Op Precautions    Assessment: Juan Marley presents with functional mobility impairments which indicate the need for skilled intervention. Tolerating session today without incident.Pt still dependent on Ue's and guarded/sl antalgic off LLE.  With chronic decreased L df. Showed him a velcro ankle brace that he can use at shoe string to df more.Plans on rehab at [place]. Will continue to follow and progress as tolerated.     Plan/Recommendations:   If medically appropriate, Moderate Intensity Therapy recommended post-acute care. This is recommended as therapy feels the patient would require 3-4 days per week and wouldn't tolerate \"3 hour daily\" rehab intensity. SNF would be the preferred choice. If the patient does not agree to SNF, arrange HH or OP depending on home bound status. If patient is medically complex, consider LTACH. Pt requires no DME at discharge.     Pt desires Skilled Rehab placement at discharge. Pt cooperative; agreeable to therapeutic recommendations and plan of care.         Basic Mobility 6-click:  Rollin = Total, A lot = 2, A little = 3; 4 = None  Supine>Sit:   1 = Total, A lot = 2, A little = 3; 4 = None   Sit>Stand with arms:  1 = " Total, A lot = 2, A little = 3; 4 = None  Bed>Chair:   1 = Total, A lot = 2, A little = 3; 4 = None  Ambulate in room:  1 = Total, A lot = 2, A little = 3; 4 = None  3-5 Steps with railin = Total, A lot = 2, A little = 3; 4 = None  Score: 16      Post-Tx Position: Up in Chair, Alarms activated, and Call light and personal items within reach  PPE: gloves    Therapy Charges for Today       Code Description Service Date Service Provider Modifiers Qty    27510823264  PT THER PROC EA 15 MIN 3/1/2025 Queta Pascual, PTA GP 2    [id number]  GAIT TRAINING EA 15 MIN 3/1/2025 Queta Pascual, PTA GP 1    [id number]  PT THERAPEUTIC ACT EA 15 MIN 3/1/2025 Queta Pascual, PTA GP 1           PT Charges       Row Name 25 1248             Time Calculation    Start Time 1029  -      Stop Time 1112  -      Time Calculation (min) 43 min  -      PT Received On 25  -      PT - Next Appointment 25  -         Time Calculation- PT    Total Timed Code Minutes- PT 43 minute(s)  -                User Key  (r) = Recorded By, (t) = Taken By, (c) = Cosigned By      Initials Name Provider Type    Queta Christian PTA Physical Therapist Assistant

## 2025-03-01 NOTE — PLAN OF CARE
Assessment: Juan Marley presents with functional mobility impairments which indicate the need for skilled intervention. Tolerating session today without incident.Pt still dependent on Ue's and guarded/sl antalgic off LLE.  With chronic decreased L df. Showed him a velcro ankle brace that he can use at shoe string to df more.Plans on rehab at [place]. Will continue to follow and progress as tolerated.

## 2025-03-02 PROBLEM — N18.30 BENIGN HYPERTENSION WITH CHRONIC KIDNEY DISEASE, STAGE III: Status: ACTIVE | Noted: 2025-03-02

## 2025-03-02 PROBLEM — N18.31 CHRONIC KIDNEY DISEASE (CKD) STAGE G3A/A1, MODERATELY DECREASED GLOMERULAR FILTRATION RATE (GFR) BETWEEN 45-59 ML/MIN/1.73 SQUARE METER AND ALBUMINURIA CREATININE RATIO LESS THAN 30 MG/G (CMS/H*: Status: ACTIVE | Noted: 2025-03-02

## 2025-03-02 PROBLEM — I12.9 BENIGN HYPERTENSION WITH CHRONIC KIDNEY DISEASE, STAGE III: Status: ACTIVE | Noted: 2025-03-02

## 2025-03-02 LAB
ALBUMIN SERPL-MCNC: 3.1 G/DL (ref 3.5–5.2)
ANION GAP SERPL CALCULATED.3IONS-SCNC: 10.6 MMOL/L (ref 5–15)
APTT PPP: 34 SECONDS (ref 22.7–35.4)
BACTERIA SPEC ANAEROBE CULT: NORMAL
BACTERIA UR QL AUTO: ABNORMAL /HPF
BILIRUB UR QL STRIP: NEGATIVE
BILIRUB UR QL STRIP: NEGATIVE
BUN SERPL-MCNC: 14 MG/DL (ref 8–23)
BUN/CREAT SERPL: 13.7 (ref 7–25)
CALCIUM SPEC-SCNC: 8.5 MG/DL (ref 8.6–10.5)
CHLORIDE SERPL-SCNC: 106 MMOL/L (ref 98–107)
CLARITY UR: ABNORMAL
CLARITY UR: CLEAR
CO2 SERPL-SCNC: 22.4 MMOL/L (ref 22–29)
COLOR UR: ABNORMAL
COLOR UR: YELLOW
CREAT SERPL-MCNC: 1.02 MG/DL (ref 0.76–1.27)
DEPRECATED RDW RBC AUTO: 41.4 FL (ref 37–54)
EGFRCR SERPLBLD CKD-EPI 2021: 73.4 ML/MIN/1.73
ERYTHROCYTE [DISTWIDTH] IN BLOOD BY AUTOMATED COUNT: 12.3 % (ref 12.3–15.4)
GLUCOSE SERPL-MCNC: 124 MG/DL (ref 65–99)
GLUCOSE UR STRIP-MCNC: NEGATIVE MG/DL
GLUCOSE UR STRIP-MCNC: NEGATIVE MG/DL
HCT VFR BLD AUTO: 35.1 % (ref 37.5–51)
HGB BLD-MCNC: 11.7 G/DL (ref 13–17.7)
HGB UR QL STRIP.AUTO: ABNORMAL
HGB UR QL STRIP.AUTO: NEGATIVE
HYALINE CASTS UR QL AUTO: ABNORMAL /LPF
INR PPP: 1.23 (ref 0.9–1.1)
KETONES UR QL STRIP: ABNORMAL
KETONES UR QL STRIP: NEGATIVE
LEUKOCYTE ESTERASE UR QL STRIP.AUTO: ABNORMAL
LEUKOCYTE ESTERASE UR QL STRIP.AUTO: NEGATIVE
MAGNESIUM SERPL-MCNC: 2 MG/DL (ref 1.6–2.4)
MCH RBC QN AUTO: 30.6 PG (ref 26.6–33)
MCHC RBC AUTO-ENTMCNC: 33.3 G/DL (ref 31.5–35.7)
MCV RBC AUTO: 91.9 FL (ref 79–97)
NITRITE UR QL STRIP: NEGATIVE
NITRITE UR QL STRIP: NEGATIVE
PH UR STRIP.AUTO: 7 [PH] (ref 5–8)
PH UR STRIP.AUTO: 7.5 [PH] (ref 5–8)
PHOSPHATE SERPL-MCNC: 3.2 MG/DL (ref 2.5–4.5)
PLATELET # BLD AUTO: 342 10*3/MM3 (ref 140–450)
PMV BLD AUTO: 9.6 FL (ref 6–12)
POTASSIUM SERPL-SCNC: 4 MMOL/L (ref 3.5–5.2)
PROT UR QL STRIP: ABNORMAL
PROT UR QL STRIP: NEGATIVE
PROTHROMBIN TIME: 15.4 SECONDS (ref 11.7–14.2)
RBC # BLD AUTO: 3.82 10*6/MM3 (ref 4.14–5.8)
RBC # UR STRIP: ABNORMAL /HPF
REF LAB TEST METHOD: ABNORMAL
SODIUM SERPL-SCNC: 139 MMOL/L (ref 136–145)
SP GR UR STRIP: 1.02 (ref 1–1.03)
SP GR UR STRIP: 1.02 (ref 1–1.03)
SQUAMOUS #/AREA URNS HPF: ABNORMAL /HPF
UROBILINOGEN UR QL STRIP: ABNORMAL
UROBILINOGEN UR QL STRIP: ABNORMAL
WBC # UR STRIP: ABNORMAL /HPF
WBC NRBC COR # BLD AUTO: 5.8 10*3/MM3 (ref 3.4–10.8)

## 2025-03-02 PROCEDURE — 83735 ASSAY OF MAGNESIUM: CPT | Performed by: INTERNAL MEDICINE

## 2025-03-02 PROCEDURE — 87086 URINE CULTURE/COLONY COUNT: CPT | Performed by: INTERNAL MEDICINE

## 2025-03-02 PROCEDURE — 85610 PROTHROMBIN TIME: CPT | Performed by: INTERNAL MEDICINE

## 2025-03-02 PROCEDURE — 85027 COMPLETE CBC AUTOMATED: CPT | Performed by: INTERNAL MEDICINE

## 2025-03-02 PROCEDURE — 85730 THROMBOPLASTIN TIME PARTIAL: CPT | Performed by: INTERNAL MEDICINE

## 2025-03-02 PROCEDURE — 81003 URINALYSIS AUTO W/O SCOPE: CPT | Performed by: FAMILY MEDICINE

## 2025-03-02 PROCEDURE — 25010000002 CEFTRIAXONE PER 250 MG: Performed by: INTERNAL MEDICINE

## 2025-03-02 PROCEDURE — 81001 URINALYSIS AUTO W/SCOPE: CPT | Performed by: INTERNAL MEDICINE

## 2025-03-02 PROCEDURE — 25010000002 HYDRALAZINE PER 20 MG: Performed by: ORTHOPAEDIC SURGERY

## 2025-03-02 PROCEDURE — 80069 RENAL FUNCTION PANEL: CPT | Performed by: ORTHOPAEDIC SURGERY

## 2025-03-02 RX ORDER — PANTOPRAZOLE SODIUM 40 MG/1
40 TABLET, DELAYED RELEASE ORAL
Status: DISCONTINUED | OUTPATIENT
Start: 2025-03-02 | End: 2025-03-05 | Stop reason: HOSPADM

## 2025-03-02 RX ORDER — ASPIRIN 81 MG/1
81 TABLET ORAL DAILY
Status: DISCONTINUED | OUTPATIENT
Start: 2025-03-02 | End: 2025-03-05 | Stop reason: HOSPADM

## 2025-03-02 RX ORDER — HYDROMORPHONE HYDROCHLORIDE 1 MG/ML
0.5 INJECTION, SOLUTION INTRAMUSCULAR; INTRAVENOUS; SUBCUTANEOUS EVERY 4 HOURS PRN
Status: DISCONTINUED | OUTPATIENT
Start: 2025-03-02 | End: 2025-03-05 | Stop reason: HOSPADM

## 2025-03-02 RX ORDER — GABAPENTIN 100 MG/1
100 CAPSULE ORAL EVERY 12 HOURS SCHEDULED
Status: DISCONTINUED | OUTPATIENT
Start: 2025-03-02 | End: 2025-03-05 | Stop reason: HOSPADM

## 2025-03-02 RX ADMIN — ASPIRIN 81 MG: 81 TABLET, COATED ORAL at 10:40

## 2025-03-02 RX ADMIN — PANTOPRAZOLE SODIUM 40 MG: 40 TABLET, DELAYED RELEASE ORAL at 10:40

## 2025-03-02 RX ADMIN — DICYCLOMINE HYDROCHLORIDE 10 MG: 10 CAPSULE ORAL at 10:40

## 2025-03-02 RX ADMIN — HYDRALAZINE HYDROCHLORIDE 10 MG: 20 INJECTION INTRAMUSCULAR; INTRAVENOUS at 02:09

## 2025-03-02 RX ADMIN — DICYCLOMINE HYDROCHLORIDE 10 MG: 10 CAPSULE ORAL at 20:30

## 2025-03-02 RX ADMIN — DICYCLOMINE HYDROCHLORIDE 10 MG: 10 CAPSULE ORAL at 15:29

## 2025-03-02 RX ADMIN — TAMSULOSIN HYDROCHLORIDE 0.4 MG: 0.4 CAPSULE ORAL at 17:06

## 2025-03-02 RX ADMIN — CEFTRIAXONE 2000 MG: 2 INJECTION, POWDER, FOR SOLUTION INTRAMUSCULAR; INTRAVENOUS at 15:29

## 2025-03-02 RX ADMIN — ACETAMINOPHEN 650 MG: 325 TABLET, FILM COATED ORAL at 11:25

## 2025-03-02 RX ADMIN — Medication 10 ML: at 10:39

## 2025-03-02 RX ADMIN — Medication 10 ML: at 20:30

## 2025-03-02 NOTE — PROGRESS NOTES
"NEPHROLOGY PROGRESS NOTE------[facility]    [facility]  [phone]  Nic Mccray MD      Patient Care Team:  Rolf Harvey MD as PCP - General (Family Medicine)  Cedric Jacobs MD as Consulting Physician (Nephrology)      Provider:  Nic Mccray MD  Patient Name: Juan Marley  :  1940    SUBJECTIVE:    F/U ARF/NATALIO/CRF/CKD    Reports one episode of gross hematuria yesterday.  No SOB, CP, dysuria. No edema. Appetite good.     Medication:  cefTRIAXone, 2,000 mg, Intravenous, Q24H  dicyclomine, 10 mg, Oral, TID  [Held by provider] gabapentin, 300 mg, Oral, TID  sodium chloride, 10 mL, Intravenous, Q12H  tamsulosin, 0.4 mg, Oral, Q PM  vitamin D, 50,000 Units, Oral, Q7 Days             OBJECTIVE    Vital Sign Min/Max for last 24 hours  Temp  Min: 98 °F (36.7 °C)  Max: 98.3 °F (36.8 °C)   BP  Min: 135/58  Max: 160/64   Pulse  Min: 59  Max: 67   Resp  Min: 15  Max: 24   SpO2  Min: 95 %  Max: 97 %   No data recorded   No data recorded     Flowsheet Rows      Flowsheet Row First Filed Value   Admission Height 177.8 cm (70\") Documented at 2025 0617   Admission Weight 87 kg (191 lb 12.8 oz) Documented at 2025 0617            No intake/output data recorded.  I/O last 3 completed shifts:  In: 840 [P.O.:840]  Out: 500 [Urine:500]    Physical Exam:  General Appearance: alert, appears stated age and cooperative  Head: normocephalic, without obvious abnormality and atraumatic  Eyes: conjunctivae and sclerae normal and no icterus  Neck: supple and no JVD  Lungs: clear to auscultation and respirations regular  Heart: regular rhythm & normal rate and normal S1, S2 +NICOLE  Chest: Wall no abnormalities observed  Abdomen: normal bowel sounds and soft, nontender  Extremities: moves extremities well, no edema, no cyanosis and no redness  Skin: no bleeding, bruising or rash, turgor normal, color normal and no lesions noted  Neurologic: " "Alert, and oriented. No focal deficits    Labs:    WBC WBC   Date Value Ref Range Status   03/02/2025 5.80 3.40 - 10.80 10*3/mm3 Final   02/27/2025 6.86 3.40 - 10.80 10*3/mm3 Final      HGB Hemoglobin   Date Value Ref Range Status   03/02/2025 11.7 (L) 13.0 - 17.7 g/dL Final   02/27/2025 10.9 (L) 13.0 - 17.7 g/dL Final      HCT Hematocrit   Date Value Ref Range Status   03/02/2025 35.1 (L) 37.5 - 51.0 % Final   02/27/2025 33.1 (L) 37.5 - 51.0 % Final      Platelets No results found for: \"LABPLAT\"   MCV MCV   Date Value Ref Range Status   03/02/2025 91.9 79.0 - 97.0 fL Final   02/27/2025 91.9 79.0 - 97.0 fL Final          Sodium Sodium   Date Value Ref Range Status   03/02/2025 139 136 - 145 mmol/L Final   02/28/2025 140 136 - 145 mmol/L Final   02/27/2025 142 136 - 145 mmol/L Final      Potassium Potassium   Date Value Ref Range Status   03/02/2025 4.0 3.5 - 5.2 mmol/L Final   02/28/2025 4.1 3.5 - 5.2 mmol/L Final   02/27/2025 3.9 3.5 - 5.2 mmol/L Final      Chloride Chloride   Date Value Ref Range Status   03/02/2025 106 98 - 107 mmol/L Final   02/28/2025 108 (H) 98 - 107 mmol/L Final   02/27/2025 110 (H) 98 - 107 mmol/L Final      CO2 CO2   Date Value Ref Range Status   03/02/2025 22.4 22.0 - 29.0 mmol/L Final   02/28/2025 21.5 (L) 22.0 - 29.0 mmol/L Final   02/27/2025 21.6 (L) 22.0 - 29.0 mmol/L Final      BUN BUN   Date Value Ref Range Status   03/02/2025 14 8 - 23 mg/dL Final   02/28/2025 14 8 - 23 mg/dL Final   02/27/2025 17 8 - 23 mg/dL Final      Creatinine Creatinine   Date Value Ref Range Status   03/02/2025 1.02 0.76 - 1.27 mg/dL Final   02/28/2025 1.00 0.76 - 1.27 mg/dL Final   02/27/2025 0.97 0.76 - 1.27 mg/dL Final      Calcium Calcium   Date Value Ref Range Status   03/02/2025 8.5 (L) 8.6 - 10.5 mg/dL Final   02/28/2025 8.4 (L) 8.6 - 10.5 mg/dL Final   02/27/2025 7.9 (L) 8.6 - 10.5 mg/dL Final      PO4 No components found for: \"PO4\"   Albumin Albumin   Date Value Ref Range Status   03/02/2025 3.1 (L) " "3.5 - 5.2 g/dL Final   02/28/2025 3.0 (L) 3.5 - 5.2 g/dL Final   02/27/2025 2.7 (L) 3.5 - 5.2 g/dL Final      Magnesium Magnesium   Date Value Ref Range Status   03/02/2025 2.0 1.6 - 2.4 mg/dL Final        Uric Acid No components found for: \"URIC ACID\"     Imaging Results (Last 72 Hours)       ** No results found for the last 72 hours. **            Results for orders placed during the hospital encounter of 02/24/25    XR Knee 1 or 2 View Left    Narrative  XR KNEE 1 OR 2 VW LEFT    Date of Exam: 2/25/2025 9:26 AM EST    Indication: post op    Comparison: Left knee radiograph 2/24/2025    Findings:  Total knee arthroplasty in anatomic alignment. Small less conspicuous joint effusion as well as decreasing soft tissue edema most significant along the anterior aspect of the knee joint. No evidence of loosening, hardware fracture or periprosthetic  fracture. Surgical drain noted.    Impression  Impression:  Decreasing soft tissue swelling and joint fluid. Radiographically uncomplicated total arthroplasty in anatomic alignment.      Electronically Signed: Erick Gilmore MD  2/25/2025 9:36 AM EST  Workstation ID: [id number]      XR Chest 1 View    Narrative  XR CHEST 1 VW    Date of Exam: 2/25/2025 5:25 AM EST    Indication: pre op    Comparison: 9/3/2015    Findings:  Heart size normal. Mild left basilar airspace disease favor atelectasis. The right lung is clear. No pneumothorax. No significant effusion. Osseous structures appear intact.    Impression  Impression:  Mild left basilar airspace disease favor atelectasis.          Electronically Signed: Reynold Marks MD  2/25/2025 7:02 AM EST  Workstation ID: [id number]      XR Knee 4+ View Left    Narrative  XR KNEE 4+ VW LEFT    Date of Exam: 2/24/2025 7:12 AM EST    Indication: pain, redness, swelling s/p TKR revision    Comparison: January 17, 2025, January 29, 2025    Findings:  Status post knee arthroplasty. Hardware components appear in stable and expected positions. No " definite acute hardware complication.    There appears to be a small to moderate knee effusion. There is anterior soft tissue prominence overlying the patella and extensor tendons. There also appears to be diffuse subcutaneous edema. No definite acute osseous abnormality.    Impression  Impression:  1.Status post knee arthroplasty.  No definite acute hardware or osseous complication.  2.Nonspecific small to moderate knee effusion, subcutaneous edema, and anterior soft tissue prominence. Joint or soft tissue infection should be considered based upon patient's reported symptoms. Correlate with clinical findings, lab values, and consider  joint aspiration.          Electronically Signed: Adrien Colette  2/24/2025 7:23 AM EST  Workstation ID: [id number]      Results for orders placed during the hospital encounter of 02/24/25    Duplex Venous Lower Extremity - Left CAR    Interpretation Summary    Normal left lower extremity venous duplex scan.        ASSESSMENT / PLAN      Pyogenic arthritis of left knee joint    S/P revision of total knee, left    Knee pain    Essential tremor    Essential hypertension    Dehydration    NATALIO (acute kidney injury)    Fever    Generalized weakness    Confusion    Abnormal liver function test    ARF/NATALIO/CRF/CKD-------Nonoliguric. ARF/NATALIO resolved. +ARF/NATALIO on top of known CRF/CKD STG 3A. CRF/CKD STG 3A secondary to HTN NS. +ARF/NATALIO secondary to prerenal state and concomitant diuretic/ACE-I use. Off of these.  No NSAIDs or IV dye.     2. HTN WITH CKD-----BP good. Avoid hypotension. No ACE-I/ARB/DRI    3. HYPOCALCEMIA------Replace IV    4. RECENT LEFT KNEE SURGERY WITH INFECTION    5. ANEMIA OF CKD------H/H stable    6. ACIDOSIS----Type 4 RTA. Better with giving po NaHCO3 and follow    7. VITAMIN D DEFICIENCY    8. HYPOPHOSPHATEMIA------replaced    9. HEMATURIA-----One episode. ? UTI. C and S pending.  On Flomax and abx      Nic Mccray MD  [facility]  [phone]  03/02/25  09:08 EST

## 2025-03-02 NOTE — PROGRESS NOTES
LOS: 6 days   Patient Care Team:  Rolf Harvey MD as PCP - General (Family Medicine)  Cedric Jacobs MD as Consulting Physician (Nephrology)    Subjective:  Quite well overall    Objective:   Afebrile//bloody urine this morning      Review of Systems:   Review of Systems   Constitutional:  Positive for activity change.   Genitourinary:  Positive for hematuria.   Musculoskeletal:  Positive for arthralgias, gait problem and joint swelling.           Vital Signs  Temp:  [98 °F (36.7 °C)-98.3 °F (36.8 °C)] 98.1 °F (36.7 °C)  Heart Rate:  [59-67] 67  Resp:  [15-24] 24  BP: (135-160)/(58-71) 144/59    Physical Exam:  Physical Exam  Vitals and nursing note reviewed.   Constitutional:       Appearance: Normal appearance. He is diaphoretic.   Skin:     General: Skin is warm.   Neurological:      Mental Status: He is alert.          Radiology:  CT Head Without Contrast    Result Date: 2/25/2025  Impression: No acute intracranial findings by CT. Mild global parenchymal volume loss and presumed sequela of chronic microvascular ischemic change. Electronically Signed: Erick Gilmore MD  2/25/2025 12:35 PM EST  Workstation ID: [id number]    US Liver    Result Date: 2/25/2025  1. Cholelithiasis. Distended gallbladder without wall thickening or pericholecystic fluid. Correlate for clinical signs of acute cholecystitis. 2. Dilated biliary tree for age. If there is concern for choledocholithiasis or other downstream obstruction recommend MRCP. Electronically Signed: Erick Gilmore MD  2/25/2025 12:31 PM EST  Workstation ID: [id number]    XR Knee 1 or 2 View Left    Result Date: 2/25/2025  Impression: Decreasing soft tissue swelling and joint fluid. Radiographically uncomplicated total arthroplasty in anatomic alignment. Electronically Signed: Erick Gilmore MD  2/25/2025 9:36 AM EST  Workstation ID: [id number]    XR Chest 1 View    Result Date: 2/25/2025  Impression: Mild left basilar airspace disease favor atelectasis.  Electronically Signed: Reynold Marks MD  2/25/2025 7:02 AM EST  Workstation ID: [id number]    US Renal Bilateral    Result Date: 2/24/2025  Impression: 1.There is renal cortical atrophy. No acute process/evidence of obstruction. Electronically Signed: Brett Newton MD  2/24/2025 3:47 PM EST  Workstation ID: [id number]    XR Knee 4+ View Left    Result Date: 2/24/2025  Impression: 1.Status post knee arthroplasty.  No definite acute hardware or osseous complication. 2.Nonspecific small to moderate knee effusion, subcutaneous edema, and anterior soft tissue prominence. Joint or soft tissue infection should be considered based upon patient's reported symptoms. Correlate with clinical findings, lab values, and consider  joint aspiration. Electronically Signed: Adrien Alvarenga  2/24/2025 7:23 AM EST  Workstation ID: [id number]        Results Review:     I reviewed the patient's new clinical results.  I reviewed the patient's new imaging results and agree with the interpretation.    Medication Review:   Scheduled Meds:cefTRIAXone, 2,000 mg, Intravenous, Q24H  dicyclomine, 10 mg, Oral, TID  [Held by provider] gabapentin, 300 mg, Oral, TID  sodium chloride, 10 mL, Intravenous, Q12H  tamsulosin, 0.4 mg, Oral, Q PM  vitamin D, 50,000 Units, Oral, Q7 Days      Continuous Infusions:   PRN Meds:.  acetaminophen **OR** acetaminophen **OR** acetaminophen    senna-docusate sodium **AND** polyethylene glycol **AND** bisacodyl **AND** bisacodyl    Calcium Replacement - Follow Nurse / BPA Driven Protocol    diphenhydrAMINE-zinc acetate    hydrALAZINE    HYDROcodone-acetaminophen    hydrOXYzine    Magnesium Standard Dose Replacement - Follow Nurse / BPA Driven Protocol    melatonin    nitroglycerin    ondansetron ODT **OR** ondansetron    Phosphorus Replacement - Follow Nurse / BPA Driven Protocol    Potassium Replacement - Follow Nurse / BPA Driven Protocol    sodium chloride    sodium chloride    Labs:    CBC    Results from last 7 days   Lab  "Units 03/02/25  0121 02/27/25 2207 02/26/25  2333 02/26/25  0401 02/25/25  0012 02/24/25  0730 02/24/25  0723   WBC 10*3/mm3 5.80 6.86 8.39 9.24 6.92  --  8.51   HEMOGLOBIN g/dL 11.7* 10.9* 10.8* 11.7* 12.8*  --  14.1   HEMOGLOBIN, POC g/dL  --   --   --   --   --  14.3  --    PLATELETS 10*3/mm3 342 248 208 162 173  --  184     BMP   Results from last 7 days   Lab Units 03/02/25  0121 02/28/25 2329 02/28/25  1053 02/27/25 2207 02/26/25 2333 02/26/25  1519 02/26/25  0401 02/25/25  1144 02/25/25  0012 02/24/25  0730 02/24/25  0723   SODIUM mmol/L 139 140  --  142 137  --  138  --  136  --  137   POTASSIUM mmol/L 4.0 4.1  --  3.9 4.2  --  4.0  --  4.1  --  3.5   CHLORIDE mmol/L 106 108*  --  110* 107  --  106  --  104  --  100   CO2 mmol/L 22.4 21.5*  --  21.6* 20.6*  --  22.5  --  22.9  --  24.7   BUN mg/dL 14 14  --  17 22  --  32*  --  41*  --  37*   CREATININE mg/dL 1.02 1.00  --  0.97 1.20  --  1.21  --  1.68* 2.10* 1.96*   GLUCOSE mg/dL 124* 107*  --  160* 142*  --  117*  --  119*  --  136*   MAGNESIUM mg/dL 2.0  --   --   --   --   --   --   --  2.4  --   --    PHOSPHORUS mg/dL 3.2 3.0 2.5 2.0* 2.5 1.7* 1.9*   < > 2.0*  --   --     < > = values in this interval not displayed.     Cr Clearance Estimated Creatinine Clearance: 68.7 mL/min (by C-G formula based on SCr of 1.02 mg/dL).  Coag   Results from last 7 days   Lab Units 03/02/25  0121 02/25/25  0012 02/24/25  0723   INR  1.23* 1.23* 1.16*   APTT seconds 34.0 34.3 31.2     HbA1C   Lab Results   Component Value Date    HGBA1C 5.50 01/17/2025     Blood Glucose No results found for: \"POCGLU\"  Infection   Results from last 7 days   Lab Units 02/25/25  0737 02/24/25  1000 02/24/25  0820 02/24/25  0723   BLOODCX   --   --  No growth at 5 days No growth at 5 days   BODYFLDCX   --  Light growth (2+) Streptococcus agalactiae (Group B)*  --   --    WOUNDCX  Scant growth (1+) Streptococcus agalactiae (Group B)*  --   --   --      CMP   Results from last 7 days   Lab " Units 03/02/25  0121 02/28/25  2329 02/27/25  2207 02/26/25  2333 02/26/25  0401 02/25/25  1144 02/25/25  0012 02/24/25  0730 02/24/25  0723   SODIUM mmol/L 139 140 142 137 138  --  136  --  137   POTASSIUM mmol/L 4.0 4.1 3.9 4.2 4.0  --  4.1  --  3.5   CHLORIDE mmol/L 106 108* 110* 107 106  --  104  --  100   CO2 mmol/L 22.4 21.5* 21.6* 20.6* 22.5  --  22.9  --  24.7   BUN mg/dL 14 14 17 22 32*  --  41*  --  37*   CREATININE mg/dL 1.02 1.00 0.97 1.20 1.21  --  1.68* 2.10* 1.96*   GLUCOSE mg/dL 124* 107* 160* 142* 117*  --  119*  --  136*   ALBUMIN g/dL 3.1* 3.0* 2.7* 3.0* 3.1*  --  3.2*  3.2*  --  3.7   BILIRUBIN mg/dL  --   --   --   --   --   --  0.8  --  1.2   ALK PHOS U/L  --   --   --   --   --   --  57  --  69   AST (SGOT) U/L  --   --   --   --   --   --  86*  --  138*   ALT (SGPT) U/L  --   --   --   --   --   --  99*  --  131*   AMMONIA umol/L  --   --   --   --   --  34  --   --   --      UA    Results from last 7 days   Lab Units 03/02/25  0536   NITRITE UA  Negative   WBC UA /HPF 11-20*   BACTERIA UA /HPF None Seen   SQUAM EPITHEL UA /HPF 0-2     Radiology(recent) No radiology results for the last day   Assessment:        Hematuria   Pyogenic arthritis of the left knee joint, Streptococcus group B species  Status post left polyethylene insert exchange and washout 2/25/2025  Acute renal failure with acute kidney injury superimposed upon chronic kidney disease stage IIIa  Hypertension associated with chronic kidney disease stage IIIa  Metabolic acidosis  Vitamin D deficiency  Anemia of chronic kidney disease  Hypocalcemia  Incontinence, urinary  BPH with LUTS  Benign essential tremor      Plan:  Hematuria evaluation//discharge planning          Dave Aldana MD  03/02/25  09:29 EST

## 2025-03-02 NOTE — PLAN OF CARE
Goal Outcome Evaluation:         Patient had an instance of bloody urine at 0116am, denies any pain on his bladder or back. Alert and oriented x 4 skin is warm to touch and moves all extremities. Plan of care is ongoing.

## 2025-03-02 NOTE — NURSING NOTE
Pt c/o burning and need to void, voided and was blood of 275ml. No abd distension or need to further void and denies pain or further burning at this time.

## 2025-03-02 NOTE — PLAN OF CARE
Goal Outcome Evaluation:         Patient Alert and Oriented x 4.  Patient Able to make needs Known. Patient able to ambulate in room with walker. Regular diet tolerated well. Pain Managed with MAR. Bed in low position. Call Light Within Reach. Plan of care ongoing

## 2025-03-02 NOTE — PLAN OF CARE
Goal Outcome Evaluation:   Patient Alert and oriented x 4. Patient able to make needs known.  Family has been at bedside.  Patient was able to work with PT and ambulate/transfer well throughout the day. Pain has been managed via MAR. Bed in a low position. Call light within reach. Plan of care ongoing.

## 2025-03-03 LAB
ALBUMIN SERPL-MCNC: 3.2 G/DL (ref 3.5–5.2)
ANION GAP SERPL CALCULATED.3IONS-SCNC: 10.3 MMOL/L (ref 5–15)
BACTERIA SPEC AEROBE CULT: NO GROWTH
BUN SERPL-MCNC: 11 MG/DL (ref 8–23)
BUN/CREAT SERPL: 11.5 (ref 7–25)
CALCIUM SPEC-SCNC: 8.8 MG/DL (ref 8.6–10.5)
CHLORIDE SERPL-SCNC: 104 MMOL/L (ref 98–107)
CO2 SERPL-SCNC: 21.7 MMOL/L (ref 22–29)
CREAT SERPL-MCNC: 0.96 MG/DL (ref 0.76–1.27)
EGFRCR SERPLBLD CKD-EPI 2021: 78.9 ML/MIN/1.73
GLUCOSE SERPL-MCNC: 112 MG/DL (ref 65–99)
PHOSPHATE SERPL-MCNC: 2.8 MG/DL (ref 2.5–4.5)
POTASSIUM SERPL-SCNC: 4.1 MMOL/L (ref 3.5–5.2)
SODIUM SERPL-SCNC: 136 MMOL/L (ref 136–145)

## 2025-03-03 PROCEDURE — 97116 GAIT TRAINING THERAPY: CPT

## 2025-03-03 PROCEDURE — 25010000002 HYDRALAZINE PER 20 MG: Performed by: ORTHOPAEDIC SURGERY

## 2025-03-03 PROCEDURE — 80069 RENAL FUNCTION PANEL: CPT | Performed by: ORTHOPAEDIC SURGERY

## 2025-03-03 PROCEDURE — 97530 THERAPEUTIC ACTIVITIES: CPT

## 2025-03-03 PROCEDURE — 97110 THERAPEUTIC EXERCISES: CPT

## 2025-03-03 PROCEDURE — 25010000002 CEFTRIAXONE PER 250 MG: Performed by: INTERNAL MEDICINE

## 2025-03-03 RX ORDER — METOPROLOL SUCCINATE 25 MG/1
25 TABLET, EXTENDED RELEASE ORAL
Status: DISCONTINUED | OUTPATIENT
Start: 2025-03-03 | End: 2025-03-05 | Stop reason: HOSPADM

## 2025-03-03 RX ORDER — LISINOPRIL 5 MG/1
10 TABLET ORAL
Status: DISCONTINUED | OUTPATIENT
Start: 2025-03-03 | End: 2025-03-05 | Stop reason: HOSPADM

## 2025-03-03 RX ADMIN — ASPIRIN 81 MG: 81 TABLET, COATED ORAL at 09:43

## 2025-03-03 RX ADMIN — Medication 10 ML: at 09:43

## 2025-03-03 RX ADMIN — CEFTRIAXONE 2000 MG: 2 INJECTION, POWDER, FOR SOLUTION INTRAMUSCULAR; INTRAVENOUS at 15:18

## 2025-03-03 RX ADMIN — LISINOPRIL 10 MG: 5 TABLET ORAL at 15:17

## 2025-03-03 RX ADMIN — HYDRALAZINE HYDROCHLORIDE 10 MG: 20 INJECTION INTRAMUSCULAR; INTRAVENOUS at 12:41

## 2025-03-03 RX ADMIN — ACETAMINOPHEN 650 MG: 325 TABLET, FILM COATED ORAL at 15:17

## 2025-03-03 RX ADMIN — METOPROLOL SUCCINATE 25 MG: 25 TABLET, EXTENDED RELEASE ORAL at 15:18

## 2025-03-03 RX ADMIN — DICYCLOMINE HYDROCHLORIDE 10 MG: 10 CAPSULE ORAL at 09:43

## 2025-03-03 RX ADMIN — DICYCLOMINE HYDROCHLORIDE 10 MG: 10 CAPSULE ORAL at 15:18

## 2025-03-03 RX ADMIN — Medication 10 ML: at 20:55

## 2025-03-03 RX ADMIN — DICYCLOMINE HYDROCHLORIDE 10 MG: 10 CAPSULE ORAL at 20:55

## 2025-03-03 RX ADMIN — PANTOPRAZOLE SODIUM 40 MG: 40 TABLET, DELAYED RELEASE ORAL at 05:00

## 2025-03-03 RX ADMIN — TAMSULOSIN HYDROCHLORIDE 0.4 MG: 0.4 CAPSULE ORAL at 17:59

## 2025-03-03 NOTE — PLAN OF CARE
"Goal Outcome Evaluation:                 Assessment: Juan Marley presents with functional mobility impairments which indicate the need for skilled intervention. Tolerating session today without incident. Continued PT recommended at SNF as pt remains at fall risk below functional baseline. Motivated/eager to participate. Will continue to follow and progress as tolerated.     Plan/Recommendations:   If medically appropriate, Moderate Intensity Therapy recommended post-acute care. This is recommended as therapy feels the patient would require 3-4 days per week and wouldn't tolerate \"3 hour daily\" rehab intensity. SNF would be the preferred choice. If the patient does not agree to SNF, arrange HH or OP depending on home bound status. If patient is medically complex, consider LTACH. Pt requires no DME at discharge.     Pt desires Skilled Rehab placement at discharge. Pt cooperative; agreeable to therapeutic recommendations and plan of care.                            "

## 2025-03-03 NOTE — PROGRESS NOTES
"NEPHROLOGY PROGRESS NOTE------[facility]    [facility]  [phone]  Cedric Jacobs MD      Patient Care Team:  Rolf Harvey MD as PCP - General (Family Medicine)  Cedric Jacobs MD as Consulting Physician (Nephrology)      Provider:  Cedric Jacobs MD  Patient Name: Juan Marley  :  1940    SUBJECTIVE:    F/U ARF/NATALIO/CRF/CKD   No SOB, CP, dysuria. No edema. Appetite good.     Medication:  aspirin, 81 mg, Oral, Daily  cefTRIAXone, 2,000 mg, Intravenous, Q24H  dicyclomine, 10 mg, Oral, TID  [Held by provider] gabapentin, 100 mg, Oral, Q12H  pantoprazole, 40 mg, Oral, Q AM  sodium chloride, 10 mL, Intravenous, Q12H  tamsulosin, 0.4 mg, Oral, Q PM  vitamin D, 50,000 Units, Oral, Q7 Days             OBJECTIVE    Vital Sign Min/Max for last 24 hours  Temp  Min: 98 °F (36.7 °C)  Max: 99 °F (37.2 °C)   BP  Min: 135/57  Max: 160/70   Pulse  Min: 54  Max: 68   Resp  Min: 16  Max: 19   SpO2  Min: 93 %  Max: 99 %   No data recorded   No data recorded     Flowsheet Rows      Flowsheet Row First Filed Value   Admission Height 177.8 cm (70\") Documented at 2025   Admission Weight 87 kg (191 lb 12.8 oz) Documented at 2025 0617            I/O this shift:  In: -   Out: 200 [Urine:200]  I/O last 3 completed shifts:  In: 360 [P.O.:360]  Out: 500 [Urine:500]    Physical Exam:  General Appearance: alert, appears stated age and cooperative  Head: normocephalic, without obvious abnormality and atraumatic  Eyes: conjunctivae and sclerae normal and no icterus  Neck: supple and no JVD  Lungs: clear to auscultation and respirations regular  Heart: regular rhythm & normal rate and normal S1, S2 +NICOLE  Chest: Wall no abnormalities observed  Abdomen: normal bowel sounds and soft, nontender  Extremities: moves extremities well, no edema, no cyanosis and no redness  Skin: no bleeding, bruising or rash, turgor normal, color normal and no lesions " "noted  Neurologic: Alert, and oriented. No focal deficits    Labs:    WBC WBC   Date Value Ref Range Status   03/02/2025 5.80 3.40 - 10.80 10*3/mm3 Final      HGB Hemoglobin   Date Value Ref Range Status   03/02/2025 11.7 (L) 13.0 - 17.7 g/dL Final      HCT Hematocrit   Date Value Ref Range Status   03/02/2025 35.1 (L) 37.5 - 51.0 % Final      Platelets No results found for: \"LABPLAT\"   MCV MCV   Date Value Ref Range Status   03/02/2025 91.9 79.0 - 97.0 fL Final          Sodium Sodium   Date Value Ref Range Status   03/03/2025 136 136 - 145 mmol/L Final   03/02/2025 139 136 - 145 mmol/L Final   02/28/2025 140 136 - 145 mmol/L Final      Potassium Potassium   Date Value Ref Range Status   03/03/2025 4.1 3.5 - 5.2 mmol/L Final   03/02/2025 4.0 3.5 - 5.2 mmol/L Final   02/28/2025 4.1 3.5 - 5.2 mmol/L Final      Chloride Chloride   Date Value Ref Range Status   03/03/2025 104 98 - 107 mmol/L Final   03/02/2025 106 98 - 107 mmol/L Final   02/28/2025 108 (H) 98 - 107 mmol/L Final      CO2 CO2   Date Value Ref Range Status   03/03/2025 21.7 (L) 22.0 - 29.0 mmol/L Final   03/02/2025 22.4 22.0 - 29.0 mmol/L Final   02/28/2025 21.5 (L) 22.0 - 29.0 mmol/L Final      BUN BUN   Date Value Ref Range Status   03/03/2025 11 8 - 23 mg/dL Final   03/02/2025 14 8 - 23 mg/dL Final   02/28/2025 14 8 - 23 mg/dL Final      Creatinine Creatinine   Date Value Ref Range Status   03/03/2025 0.96 0.76 - 1.27 mg/dL Final   03/02/2025 1.02 0.76 - 1.27 mg/dL Final   02/28/2025 1.00 0.76 - 1.27 mg/dL Final      Calcium Calcium   Date Value Ref Range Status   03/03/2025 8.8 8.6 - 10.5 mg/dL Final   03/02/2025 8.5 (L) 8.6 - 10.5 mg/dL Final   02/28/2025 8.4 (L) 8.6 - 10.5 mg/dL Final      PO4 No components found for: \"PO4\"   Albumin Albumin   Date Value Ref Range Status   03/03/2025 3.2 (L) 3.5 - 5.2 g/dL Final   03/02/2025 3.1 (L) 3.5 - 5.2 g/dL Final   02/28/2025 3.0 (L) 3.5 - 5.2 g/dL Final      Magnesium Magnesium   Date Value Ref Range Status " "  03/02/2025 2.0 1.6 - 2.4 mg/dL Final        Uric Acid No components found for: \"URIC ACID\"     Imaging Results (Last 72 Hours)       ** No results found for the last 72 hours. **            Results for orders placed during the hospital encounter of 02/24/25    XR Knee 1 or 2 View Left    Narrative  XR KNEE 1 OR 2 VW LEFT    Date of Exam: 2/25/2025 9:26 AM EST    Indication: post op    Comparison: Left knee radiograph 2/24/2025    Findings:  Total knee arthroplasty in anatomic alignment. Small less conspicuous joint effusion as well as decreasing soft tissue edema most significant along the anterior aspect of the knee joint. No evidence of loosening, hardware fracture or periprosthetic  fracture. Surgical drain noted.    Impression  Impression:  Decreasing soft tissue swelling and joint fluid. Radiographically uncomplicated total arthroplasty in anatomic alignment.      Electronically Signed: Erick Gilmore MD  2/25/2025 9:36 AM EST  Workstation ID: [id number]      XR Chest 1 View    Narrative  XR CHEST 1 VW    Date of Exam: 2/25/2025 5:25 AM EST    Indication: pre op    Comparison: 9/3/2015    Findings:  Heart size normal. Mild left basilar airspace disease favor atelectasis. The right lung is clear. No pneumothorax. No significant effusion. Osseous structures appear intact.    Impression  Impression:  Mild left basilar airspace disease favor atelectasis.          Electronically Signed: Reynold Marks MD  2/25/2025 7:02 AM EST  Workstation ID: [id number]      XR Knee 4+ View Left    Narrative  XR KNEE 4+ VW LEFT    Date of Exam: 2/24/2025 7:12 AM EST    Indication: pain, redness, swelling s/p TKR revision    Comparison: January 17, 2025, January 29, 2025    Findings:  Status post knee arthroplasty. Hardware components appear in stable and expected positions. No definite acute hardware complication.    There appears to be a small to moderate knee effusion. There is anterior soft tissue prominence overlying the patella " and extensor tendons. There also appears to be diffuse subcutaneous edema. No definite acute osseous abnormality.    Impression  Impression:  1.Status post knee arthroplasty.  No definite acute hardware or osseous complication.  2.Nonspecific small to moderate knee effusion, subcutaneous edema, and anterior soft tissue prominence. Joint or soft tissue infection should be considered based upon patient's reported symptoms. Correlate with clinical findings, lab values, and consider  joint aspiration.          Electronically Signed: Adrien Alvarenga  2/24/2025 7:23 AM EST  Workstation ID: [id number]      Results for orders placed during the hospital encounter of 02/24/25    Duplex Venous Lower Extremity - Left CAR    Interpretation Summary    Normal left lower extremity venous duplex scan.        ASSESSMENT / PLAN      Pyogenic arthritis of left knee joint    S/P revision of total knee, left    Knee pain    Essential tremor    Essential hypertension    Dehydration    NATALIO (acute kidney injury)    Fever    Generalized weakness    Confusion    Abnormal liver function test    Benign hypertension with chronic kidney disease, stage III    ARF/NATALIO/CRF/CKD-------Nonoliguric. ARF/NATALIO resolved. +ARF/NATALIO on top of known CRF/CKD STG 3A. CRF/CKD STG 3A secondary to HTN NS. +ARF/NATALIO secondary to prerenal state and concomitant diuretic/ACE-I use. Off of these.  No NSAIDs or IV dye.     2. HTN WITH CKD-----BP good. Avoid hypotension. No ACE-I/ARB/DRI    3. HYPOCALCEMIA------Replace IV    4. RECENT LEFT KNEE SURGERY WITH INFECTION    5. ANEMIA OF CKD------H/H stable    6. ACIDOSIS----Type 4 RTA. Better with giving po NaHCO3 and follow    7. VITAMIN D DEFICIENCY    8. HYPOPHOSPHATEMIA------replaced    9. HEMATURIA-----One episode. ? UTI. C and S pending.  On Flomax and abx    CR stable  Awaits rehab      Cedric Jacobs MD  [facility]  [phone]  03/03/25  10:50 EST

## 2025-03-03 NOTE — THERAPY TREATMENT NOTE
"Subjective: Pt/nursing agreeable to therapeutic plan of care.    Objective:     Precautions - Fall precautions, LLE WBAT    Bed mobility - SBA; able to perform with proper technique   Transfers - CGA with FWW  Ambulation - 90 feet with FWW and CGA; decreased L heel strike d/t pt report of h/o L drop foot    Therapeutic Exercise - 15 Reps B LE AROM lying supine with HOB elevated; PT cuing provided for proper technique     Vitals: WNL    Pain: 7  Location: Lknee  Intervention for pain: Repositioned and RN notified    Education: Verbal/Tactile Cues    Assessment: Juan Marley presents with functional mobility impairments which indicate the need for skilled intervention. Tolerating session today without incident. Continued PT recommended at SNF as pt remains at fall risk below functional baseline. Motivated/eager to participate. Will continue to follow and progress as tolerated.     Plan/Recommendations:   If medically appropriate, Moderate Intensity Therapy recommended post-acute care. This is recommended as therapy feels the patient would require 3-4 days per week and wouldn't tolerate \"3 hour daily\" rehab intensity. SNF would be the preferred choice. If the patient does not agree to SNF, arrange HH or OP depending on home bound status. If patient is medically complex, consider LTACH. Pt requires no DME at discharge.     Pt desires Skilled Rehab placement at discharge. Pt cooperative; agreeable to therapeutic recommendations and plan of care.         Basic Mobility 6-click:  Rollin = Total, A lot = 2, A little = 3; 4 = None  Supine>Sit:   1 = Total, A lot = 2, A little = 3; 4 = None   Sit>Stand with arms:  1 = Total, A lot = 2, A little = 3; 4 = None  Bed>Chair:   1 = Total, A lot = 2, A little = 3; 4 = None  Ambulate in room:  1 = Total, A lot = 2, A little = 3; 4 = None  3-5 Steps with railin = Total, A lot = 2, A little = 3; 4 = None  Score: 22    Post-Tx Position: Up in Chair, Alarms activated, " and Call light and personal items within reach  PPE: gloves

## 2025-03-03 NOTE — PROGRESS NOTES
LOS: 7 days   Patient Care Team:  Rolf Harvey MD as PCP - General (Family Medicine)  Cedric Jacobs MD as Consulting Physician (Nephrology)    Subjective     Interval History: Stable overnight     Patient Complaints: No voiced complaints, eager for dicharge    History taken from: patient    Review of Systems   Constitutional:  Positive for activity change. Negative for fever.   HENT:  Negative for trouble swallowing.    Eyes:  Negative for visual disturbance.   Respiratory:  Negative for cough and shortness of breath.    Cardiovascular:  Negative for chest pain, palpitations and leg swelling.   Gastrointestinal:  Negative for abdominal pain, constipation and diarrhea.   Endocrine: Negative for polyuria.   Genitourinary:  Negative for difficulty urinating.   Musculoskeletal:  Positive for arthralgias and gait problem.   Neurological:  Positive for weakness.   Psychiatric/Behavioral:  Negative for confusion.            Objective     Vital Signs  Temp:  [98 °F (36.7 °C)-99 °F (37.2 °C)] 98 °F (36.7 °C)  Heart Rate:  [61-68] 61  Resp:  [16-19] 16  BP: (135-186)/(57-70) 186/66    Physical Exam:     General Appearance:    Alert, cooperative, in no acute distress,   Head:    Normocephalic, without obvious abnormality, atraumatic   Eyes:            Lids and lashes normal, conjunctivae and sclerae normal, no   icterus, no pallor, corneas clear   Ears:    Ears appear intact with no abnormalities noted   Throat:   No oral lesions, no thrush, oral mucosa moist   Neck:   No adenopathy, supple, trachea midline, no thyromegaly, no   carotid bruit, no JVD   Lungs:     Clear to auscultation,respirations regular, even and unlabored    Heart:    Regular rhythm and normal rate, normal S1 and S2, no murmur, no gallop, no rub, no click   Chest Wall:    No abnormalities observed   Abdomen:     Normal bowel sounds, no masses, no organomegaly, soft Non-tender non-distended, no guarding,   Extremities:   Decreased ROM left  knee. Left leg appropriately wrapped. No edema, no cyanosis, no redness   Pulses:   Pulses palpable and equal bilaterally   Skin:   No bleeding, bruising or rash   Lymph nodes:   No palpable adenopathy            Results Review:    Lab Results (last 24 hours)       Procedure Component Value Units Date/Time    Renal Function Panel [[id number]]  (Abnormal) Collected: 03/03/25 0500    Specimen: Blood from Arm, Left Updated: 03/03/25 0603     Glucose 112 mg/dL      BUN 11 mg/dL      Creatinine 0.96 mg/dL      Sodium 136 mmol/L      Potassium 4.1 mmol/L      Chloride 104 mmol/L      CO2 21.7 mmol/L      Calcium 8.8 mg/dL      Albumin 3.2 g/dL      Phosphorus 2.8 mg/dL      Anion Gap 10.3 mmol/L      BUN/Creatinine Ratio 11.5     eGFR 78.9 mL/min/1.73     Narrative:      GFR Categories in Chronic Kidney Disease (CKD)      GFR Category          GFR (mL/min/1.73)    Interpretation  G1                     90 or greater         Normal or high (1)  G2                      60-89                Mild decrease (1)  G3a                   45-59                Mild to moderate decrease  G3b                   30-44                Moderate to severe decrease  G4                    15-29                Severe decrease  G5                    14 or less           Kidney failure          (1)In the absence of evidence of kidney disease, neither GFR category G1 or G2 fulfill the criteria for CKD.    eGFR calculation 2021 CKD-EPI creatinine equation, which does not include race as a factor             Imaging Results (Last 24 Hours)       ** No results found for the last 24 hours. **                 I reviewed the patient's new clinical results.    Medication Review:   Scheduled Meds:aspirin, 81 mg, Oral, Daily  cefTRIAXone, 2,000 mg, Intravenous, Q24H  dicyclomine, 10 mg, Oral, TID  [Held by provider] gabapentin, 100 mg, Oral, Q12H  pantoprazole, 40 mg, Oral, Q AM  sodium chloride, 10 mL, Intravenous, Q12H  tamsulosin, 0.4 mg, Oral, Q  PM  vitamin D, 50,000 Units, Oral, Q7 Days      Continuous Infusions:   PRN Meds:.  acetaminophen **OR** acetaminophen **OR** acetaminophen    senna-docusate sodium **AND** polyethylene glycol **AND** bisacodyl **AND** bisacodyl    Calcium Replacement - Follow Nurse / BPA Driven Protocol    diphenhydrAMINE-zinc acetate    hydrALAZINE    HYDROcodone-acetaminophen    HYDROmorphone    hydrOXYzine    Magnesium Standard Dose Replacement - Follow Nurse / BPA Driven Protocol    melatonin    nitroglycerin    ondansetron ODT **OR** ondansetron    Phosphorus Replacement - Follow Nurse / BPA Driven Protocol    Potassium Replacement - Follow Nurse / BPA Driven Protocol    sodium chloride    sodium chloride     Assessment & Plan       Pyogenic arthritis of left knee joint    S/P revision of total knee, left    Knee pain    Essential tremor    Essential hypertension    Dehydration    NATALIO (acute kidney injury)    Fever    Generalized weakness    Confusion    Abnormal liver function test    Benign hypertension with chronic kidney disease, stage III      Pyogenic arthritis left knee/S/P total knee revision left knee  -IV ceftriaxone x 6 weeks, last day 04/07/2025  -Cx grew group B strep  -PT/OT  -Waiting on SNF placement    Hematuria  -episode of hematuria yesterday, repeat UA normal  -urine culture pending    DVT prophylaxis  -SCDs    Plan for disposition:[facility]    Maria Fernanda Obrien PA-C  03/03/25  12:25 EST

## 2025-03-03 NOTE — PLAN OF CARE
Problem: Fall Injury Risk  Goal: Absence of Fall and Fall-Related Injury  Outcome: Progressing  Intervention: Identify and Manage Contributors  Recent Flowsheet Documentation  Taken 3/3/2025 0200 by Alena Perez LPN  Medication Review/Management: medications reviewed  Taken 3/3/2025 0000 by Alena Perez LPN  Medication Review/Management: medications reviewed  Taken 3/2/2025 2000 by Alena Perez LPN  Medication Review/Management: medications reviewed  Intervention: Promote Injury-Free Environment  Recent Flowsheet Documentation  Taken 3/3/2025 0400 by Alena Perez LPN  Safety Promotion/Fall Prevention: safety round/check completed  Taken 3/3/2025 0200 by Alena Perez LPN  Safety Promotion/Fall Prevention:   safety round/check completed   assistive device/personal items within reach  Taken 3/3/2025 0000 by Alena Perez LPN  Safety Promotion/Fall Prevention:   safety round/check completed   fall prevention program maintained   assistive device/personal items within reach  Taken 3/2/2025 2000 by Alena Perez LPN  Safety Promotion/Fall Prevention:   safety round/check completed   room organization consistent   nonskid shoes/slippers when out of bed   fall prevention program maintained   clutter free environment maintained   assistive device/personal items within reach   activity supervised     Problem: Pain Acute  Goal: Optimal Pain Control and Function  Outcome: Progressing  Intervention: Optimize Psychosocial Wellbeing  Recent Flowsheet Documentation  Taken 3/2/2025 2000 by Alena Perez LPN  Diversional Activities:   television   smartphone  Intervention: Develop Pain Management Plan  Recent Flowsheet Documentation  Taken 3/3/2025 0000 by Alena Perez LPN  Pain Management Interventions: quiet environment facilitated  Taken 3/2/2025 2000 by Alena Perez LPN  Pain Management Interventions:   no interventions per patient request   quiet environment facilitated  Intervention:  Prevent or Manage Pain  Recent Flowsheet Documentation  Taken 3/3/2025 0200 by Alena Perez LPN  Medication Review/Management: medications reviewed  Taken 3/3/2025 0000 by Alena Perez LPN  Medication Review/Management: medications reviewed  Taken 3/2/2025 2000 by Alena Perez LPN  Medication Review/Management: medications reviewed     Problem: Adult Inpatient Plan of Care  Goal: Plan of Care Review  Outcome: Progressing  Flowsheets (Taken 3/3/2025 0511)  Progress: improving  Outcome Evaluation: pt up with assist x1 to bedside commode, urinal within reach. pt without complaints of pain at this time. continue IV antibiotics. VSS no concerns at this time.  Plan of Care Reviewed With: patient  Goal: Patient-Specific Goal (Individualized)  Outcome: Progressing  Goal: Absence of Hospital-Acquired Illness or Injury  Outcome: Progressing  Intervention: Identify and Manage Fall Risk  Recent Flowsheet Documentation  Taken 3/3/2025 0400 by Alena Perez LPN  Safety Promotion/Fall Prevention: safety round/check completed  Taken 3/3/2025 0200 by Alena Perez LPN  Safety Promotion/Fall Prevention:   safety round/check completed   assistive device/personal items within reach  Taken 3/3/2025 0000 by Alena Perez LPN  Safety Promotion/Fall Prevention:   safety round/check completed   fall prevention program maintained   assistive device/personal items within reach  Taken 3/2/2025 2000 by Alena Perez LPN  Safety Promotion/Fall Prevention:   safety round/check completed   room organization consistent   nonskid shoes/slippers when out of bed   fall prevention program maintained   clutter free environment maintained   assistive device/personal items within reach   activity supervised  Intervention: Prevent Skin Injury  Recent Flowsheet Documentation  Taken 3/3/2025 0400 by Alena Perez LPN  Body Position: position changed independently  Taken 3/3/2025 0200 by Alena Perez LPN  Body Position: position  changed independently  Taken 3/3/2025 0000 by Alena Perez LPN  Body Position: position changed independently  Taken 3/2/2025 2000 by Alena Perez LPN  Body Position: position changed independently  Skin Protection: incontinence pads utilized  Intervention: Prevent and Manage VTE (Venous Thromboembolism) Risk  Recent Flowsheet Documentation  Taken 3/2/2025 2000 by Alena Perez LPN  VTE Prevention/Management:   right   SCDs (sequential compression devices) on  Intervention: Prevent Infection  Recent Flowsheet Documentation  Taken 3/3/2025 0400 by Alena Perez LPN  Infection Prevention:   hand hygiene promoted   rest/sleep promoted   single patient room provided  Taken 3/3/2025 0200 by Alena Perez LPN  Infection Prevention:   single patient room provided   rest/sleep promoted   hand hygiene promoted  Taken 3/3/2025 0000 by Alena Perez LPN  Infection Prevention:   single patient room provided   rest/sleep promoted   hand hygiene promoted  Taken 3/2/2025 2000 by Alena Perez LPN  Infection Prevention:   single patient room provided   hand hygiene promoted  Goal: Optimal Comfort and Wellbeing  Outcome: Progressing  Intervention: Monitor Pain and Promote Comfort  Recent Flowsheet Documentation  Taken 3/3/2025 0000 by Alena Perez LPN  Pain Management Interventions: quiet environment facilitated  Taken 3/2/2025 2000 by Alena Perez LPN  Pain Management Interventions:   no interventions per patient request   quiet environment facilitated  Intervention: Provide Person-Centered Care  Recent Flowsheet Documentation  Taken 3/2/2025 2000 by Alena Perez LPN  Trust Relationship/Rapport:   care explained   questions answered   thoughts/feelings acknowledged  Goal: Readiness for Transition of Care  Outcome: Progressing     Problem: Skin Injury Risk Increased  Goal: Skin Health and Integrity  Outcome: Progressing  Intervention: Optimize Skin Protection  Recent Flowsheet Documentation  Taken  3/3/2025 0400 by Alena Perez LPN  Activity Management: activity encouraged  Head of Bed (HOB) Positioning: HOB elevated  Taken 3/3/2025 0200 by Alena Perez LPN  Head of Bed (HOB) Positioning: HOB elevated  Taken 3/3/2025 0000 by Alena Perez LPN  Head of Bed (HOB) Positioning: HOB elevated  Taken 3/2/2025 2000 by Alena Perez LPN  Pressure Reduction Techniques: frequent weight shift encouraged  Head of Bed (HOB) Positioning: HOB elevated  Pressure Reduction Devices: pressure-redistributing mattress utilized  Skin Protection: incontinence pads utilized     Problem: Surgery Nonspecified  Goal: Absence of Bleeding  Outcome: Progressing  Goal: Effective Bowel Elimination  Outcome: Progressing  Goal: Fluid and Electrolyte Balance  Outcome: Progressing  Goal: Absence of Infection Signs and Symptoms  Outcome: Progressing  Goal: Optimal Pain Control and Function  Outcome: Progressing  Intervention: Prevent or Manage Pain  Recent Flowsheet Documentation  Taken 3/3/2025 0000 by Alena Perez LPN  Pain Management Interventions: quiet environment facilitated  Taken 3/2/2025 2000 by Alena Perez LPN  Pain Management Interventions:   no interventions per patient request   quiet environment facilitated  Diversional Activities:   television   smartphone  Goal: Effective Urinary Elimination  Outcome: Progressing   Goal Outcome Evaluation:  Plan of Care Reviewed With: patient        Progress: improving  Outcome Evaluation: pt up with assist x1 to bedside commode, urinal within reach. pt without complaints of pain at this time. continue IV antibiotics. VSS no concerns at this time.

## 2025-03-03 NOTE — CASE MANAGEMENT/SOCIAL WORK
Continued Stay Note  [facility]     Patient Name: Juan Marley  MRN: [id number]  Today's Date: 3/3/2025    Admit Date: 2/24/2025    Plan: Referral to [facility] skilled pending acceptance.  Will require precert.  PASRR approved.   Discharge Plan       Row Name 03/03/25 1147       Plan    Plan Referral to [facility] skilled pending acceptance.  Will require precert.  PASRR approved.    Plan Comments Referral to [facility] skilled pending acceptance. Will need precert. PASRR  approved.  [facility] do not have private rooms so referral was sent to [place].  pod#5 left knee polyethylene insert exchange and washout. , IV antibiotics, ( per ID will need 6 weeks of IV antibiotics)                 Susy Cardenas RN    [facility] 1  [email or web address]  Office [phone]  Cell [phone]

## 2025-03-04 LAB
ALBUMIN SERPL-MCNC: 3 G/DL (ref 3.5–5.2)
ALBUMIN SERPL-MCNC: 3.1 G/DL (ref 3.5–5.2)
ANION GAP SERPL CALCULATED.3IONS-SCNC: 8.6 MMOL/L (ref 5–15)
ANION GAP SERPL CALCULATED.3IONS-SCNC: 9.5 MMOL/L (ref 5–15)
BUN SERPL-MCNC: 10 MG/DL (ref 8–23)
BUN SERPL-MCNC: 13 MG/DL (ref 8–23)
BUN/CREAT SERPL: 10.9 (ref 7–25)
BUN/CREAT SERPL: 12.6 (ref 7–25)
CALCIUM SPEC-SCNC: 8.6 MG/DL (ref 8.6–10.5)
CALCIUM SPEC-SCNC: 9.1 MG/DL (ref 8.6–10.5)
CHLORIDE SERPL-SCNC: 106 MMOL/L (ref 98–107)
CHLORIDE SERPL-SCNC: 106 MMOL/L (ref 98–107)
CO2 SERPL-SCNC: 20.5 MMOL/L (ref 22–29)
CO2 SERPL-SCNC: 22.4 MMOL/L (ref 22–29)
CREAT SERPL-MCNC: 0.92 MG/DL (ref 0.76–1.27)
CREAT SERPL-MCNC: 1.03 MG/DL (ref 0.76–1.27)
EGFRCR SERPLBLD CKD-EPI 2021: 72.5 ML/MIN/1.73
EGFRCR SERPLBLD CKD-EPI 2021: 83.1 ML/MIN/1.73
GLUCOSE SERPL-MCNC: 119 MG/DL (ref 65–99)
GLUCOSE SERPL-MCNC: 132 MG/DL (ref 65–99)
PHOSPHATE SERPL-MCNC: 2.9 MG/DL (ref 2.5–4.5)
PHOSPHATE SERPL-MCNC: 3 MG/DL (ref 2.5–4.5)
POTASSIUM SERPL-SCNC: 4 MMOL/L (ref 3.5–5.2)
POTASSIUM SERPL-SCNC: 4.1 MMOL/L (ref 3.5–5.2)
SODIUM SERPL-SCNC: 136 MMOL/L (ref 136–145)
SODIUM SERPL-SCNC: 137 MMOL/L (ref 136–145)

## 2025-03-04 PROCEDURE — 80069 RENAL FUNCTION PANEL: CPT | Performed by: ORTHOPAEDIC SURGERY

## 2025-03-04 PROCEDURE — 97530 THERAPEUTIC ACTIVITIES: CPT

## 2025-03-04 PROCEDURE — 97116 GAIT TRAINING THERAPY: CPT

## 2025-03-04 PROCEDURE — 25010000002 CEFTRIAXONE PER 250 MG: Performed by: INTERNAL MEDICINE

## 2025-03-04 PROCEDURE — 97110 THERAPEUTIC EXERCISES: CPT

## 2025-03-04 RX ADMIN — TAMSULOSIN HYDROCHLORIDE 0.4 MG: 0.4 CAPSULE ORAL at 16:48

## 2025-03-04 RX ADMIN — CEFTRIAXONE 2000 MG: 2 INJECTION, POWDER, FOR SOLUTION INTRAMUSCULAR; INTRAVENOUS at 16:48

## 2025-03-04 RX ADMIN — Medication 10 ML: at 20:15

## 2025-03-04 RX ADMIN — PANTOPRAZOLE SODIUM 40 MG: 40 TABLET, DELAYED RELEASE ORAL at 05:55

## 2025-03-04 RX ADMIN — DICYCLOMINE HYDROCHLORIDE 10 MG: 10 CAPSULE ORAL at 09:49

## 2025-03-04 RX ADMIN — Medication 10 ML: at 09:53

## 2025-03-04 RX ADMIN — DICYCLOMINE HYDROCHLORIDE 10 MG: 10 CAPSULE ORAL at 16:48

## 2025-03-04 RX ADMIN — DICYCLOMINE HYDROCHLORIDE 10 MG: 10 CAPSULE ORAL at 20:14

## 2025-03-04 RX ADMIN — LISINOPRIL 10 MG: 5 TABLET ORAL at 09:48

## 2025-03-04 RX ADMIN — HYDROCODONE BITARTRATE AND ACETAMINOPHEN 1 TABLET: 7.5; 325 TABLET ORAL at 10:36

## 2025-03-04 RX ADMIN — ASPIRIN 81 MG: 81 TABLET, COATED ORAL at 09:53

## 2025-03-04 NOTE — DISCHARGE PLACEMENT REQUEST
"Juan Marley (84 y.o. Male)       Date of Birth   1940    Social Security Number       Address   [address] [facility] [place] [address]    Home Phone   [phone]    MRN   [id number]       Rastafari   None    Marital Status                               Admission Date   2/24/25    Admission Type   Emergency    Admitting Provider   Vanessa King MD    Attending Provider   Erum Casey MD    Department, Room/Bed   [facility] SURGICAL INPATIENT, 4117/1       Discharge Date       Discharge Disposition       Discharge Destination                                 Attending Provider: Erum Casey MD    Allergies: No Known Allergies    Isolation: None   Infection: None   Code Status: No CPR    Ht: 177.8 cm (70\")   Wt: 87 kg (191 lb 12.8 oz)    Admission Cmt: None   Principal Problem: Pyogenic arthritis of left knee joint [M00.9]                   Active Insurance as of 2/24/2025       Primary Coverage       Payor Plan Insurance Group Employer/Plan Group    HUMANA MEDICARE REPLACEMENT HUMANA MEDICARE ADVANTAGE PPO [id number]       Payor Plan Address Payor Plan Phone Number Payor Plan Fax Number Effective Dates    [address] [address] [phone]  2/1/2024 - None Entered    [facility] [id number]         Subscriber Name Subscriber Birth Date Member ID       JUAN MARLEY 1940 [id number]                     Emergency Contacts        (Rel.) Home Phone Work Phone Mobile Phone    JOYCE MARLEY (Son) [phone] -- [phone]    brandi marley (Son) -- -- [phone]                 History & Physical        Radha Garner, APRN at 02/24/25 1213       Attestation signed by Vanessa King MD at 02/24/25 175    I have reviewed this documentation and agree.                      Patient Care Team:  Rolf Harvey MD as PCP - General (Family Medicine)    Chief complaint Left knee pain    Subjective    Patient is a 84 y.o. male who presents with PMH of hypertension, enlarged " prostate, and hypertension. Patient had left knee replacement at the end of January and presents today with knee pain, fever, and falls. Information is given by son at bedside as he states his father has not had any rest and was given IV pain medication. He does state that his father does live alone and is alert and oriented. He states that he was doing well post operatively and going to outpatient therapy without an issues. Wednesday he even drove himself to therapy, but during therapy he thought he hurt himself. Son states after that session he had rapid decline laying around, not eating or drinking and continued to take pain medication. States that he had 3 falls. In the ED, he had a fever, creatinine elevated, CRP elevated, lactic normal, doppler of leg negative for clot. He did have joint aspiration per ortho.     Review of Systems   Unable to perform ROS: Other          History  Past Medical History:   Diagnosis Date    Enlarged prostate     Essential tremor     BOTH HANDS    Hypertension     Knee pain, left     Rotator cuff tear, left     Wears dentures      Past Surgical History:   Procedure Laterality Date    CATARACT EXTRACTION, BILATERAL      COLONOSCOPY      ELBOW OLECRANNON BURSA EXCISION Right     X2    ENDOSCOPY      KNEE MINI REVISION Left 2025    Procedure: LEFT  TOTAL KNEE ARTHROPLASTY REVISION POLYETHYLENE EXCHANGE;  Surgeon: Wilmer Bruce MD;  Location: [facility] OR [facility];  Service: Orthopedics;  Laterality: Left;    SPINE SURGERY  2015    SPINAL STENOSIS    SPINE SURGERY  2016    RUPTURED DISC    TONSILLECTOMY AND ADENOIDECTOMY      AGES 20S    TOTAL KNEE ARTHROPLASTY Left     VASECTOMY       Family History   Problem Relation Age of Onset    Malig Hyperthermia Neg Hx      Social History     Tobacco Use    Smoking status: Former     Current packs/day: 0.00     Types: Cigarettes     Quit date:      Years since quittin.1    Smokeless tobacco: Never   Vaping Use    Vaping status: Never  Used   Substance Use Topics    Alcohol use: Yes     Comment: OCCASIONAL    Drug use: Never     (Not in a hospital admission)    Allergies:  Patient has no known allergies.    Objective    Vital Signs  Temp:  [100.9 °F (38.3 °C)] 100.9 °F (38.3 °C)  Heart Rate:  [55-74] 55  Resp:  [16] 16  BP: (100-121)/(52-72) 102/52       Physical Exam  Constitutional:       General: He is sleeping.      Appearance: He is ill-appearing.   HENT:      Head: Normocephalic and atraumatic.      Right Ear: External ear normal.      Left Ear: External ear normal.      Nose: Nose normal.      Mouth/Throat:      Mouth: Mucous membranes are dry.   Eyes:      General:         Right eye: No discharge.         Left eye: No discharge.   Cardiovascular:      Rate and Rhythm: Normal rate and regular rhythm.      Pulses: Normal pulses.      Heart sounds: Normal heart sounds.   Pulmonary:      Effort: Pulmonary effort is normal.      Breath sounds: Normal breath sounds.   Abdominal:      General: Bowel sounds are normal.      Palpations: Abdomen is soft.   Musculoskeletal:         General: Swelling and tenderness present. Normal range of motion.      Cervical back: Normal range of motion.   Skin:     General: Skin is dry.   Neurological:      Mental Status: Mental status is at baseline.   Psychiatric:         Behavior: Behavior normal.          Results Review:     Imaging Results (Last 24 Hours)       Procedure Component Value Units Date/Time    XR Knee 4+ View Left [[id number]] Collected: 02/24/25 0716     Updated: 02/24/25 0725    Narrative:      XR KNEE 4+ VW LEFT    Date of Exam: 2/24/2025 7:12 AM EST    Indication: pain, redness, swelling s/p TKR revision    Comparison: January 17, 2025, January 29, 2025    Findings:  Status post knee arthroplasty. Hardware components appear in stable and expected positions. No definite acute hardware complication.    There appears to be a small to moderate knee effusion. There is anterior soft tissue prominence  overlying the patella and extensor tendons. There also appears to be diffuse subcutaneous edema. No definite acute osseous abnormality.      Impression:      Impression:  1.Status post knee arthroplasty.  No definite acute hardware or osseous complication.  2.Nonspecific small to moderate knee effusion, subcutaneous edema, and anterior soft tissue prominence. Joint or soft tissue infection should be considered based upon patient's reported symptoms. Correlate with clinical findings, lab values, and consider   joint aspiration.          Electronically Signed: Adrien Colette    2/24/2025 7:23 AM EST    Workstation ID: [id number]             Lab Results (last 24 hours)       Procedure Component Value Units Date/Time    COVID-19 and FLU A/B PCR, 1 HR TAT - Swab, Nasopharynx [[id number]]  (Normal) Collected: 02/24/25 1128    Specimen: Swab from Nasopharynx Updated: 02/24/25 1154     COVID19 Not Detected     Influenza A PCR Not Detected     Influenza B PCR Not Detected    Narrative:      Fact sheet for providers: https://www.fda.gov/media/127668/download    Fact sheet for patients: https://www.fda.gov/media/794081/download    Test performed by PCR.    Body Fluid Cell Count With Differential - Synovial Fluid, Knee, Left [[id number]]  (Abnormal) Collected: 02/24/25 1000    Specimen: Synovial Fluid from Knee, Left Updated: 02/24/25 1143    Narrative:      The following orders were created for panel order Body Fluid Cell Count With Differential - Synovial Fluid, Knee, Left.  Procedure                               Abnormality         Status                     ---------                               -----------         ------                     Body fluid cell count - ...[[id number]]  Abnormal            Final result               Body fluid differential ...[[id number]]                      Final result                 Please view results for these tests on the individual orders.    Body fluid differential - Synovial Fluid, Knee,  Left [[id number]] Collected: 02/24/25 1000    Specimen: Synovial Fluid from Knee, Left Updated: 02/24/25 1143     Neutrophils, Fluid % 95 %      Lymphocytes, Fluid % 2 %      Monocytes, Fluid % 3 %     Narrative:      Concentrated Smear by Cytocentrifuge Prep.    Body Fluid Culture - Synovial Fluid, Knee, Left [[id number]] Collected: 02/24/25 1000    Specimen: Synovial Fluid from Knee, Left Updated: 02/24/25 1051     Gram Stain Many (4+) WBCs per low power field      No organisms seen    Body fluid cell count - Synovial Fluid, Knee, Left [[id number]]  (Abnormal) Collected: 02/24/25 1000    Specimen: Synovial Fluid from Knee, Left Updated: 02/24/25 1050     Color, Fluid Orange     Appearance, Fluid Cloudy     Nucleated Cells, Fluid 92,390 /mm3      Method: Automated SysmDuel XN Method    Narrative:      No reference range established. Physician to interpret results with clinical findings.    Crystal Exam, Fluid - Synovial Fluid, [[id number]] Collected: 02/24/25 1000    Specimen: Synovial Fluid Updated: 02/24/25 1030     Crystals, Fluid No crystals seen    Glucose, Body Fluid - Synovial Fluid, Knee, Left [[id number]] Collected: 02/24/25 1000    Specimen: Synovial Fluid from Knee, Left Updated: 02/24/25 1004    Protein, Body Fluid - Synovial Fluid, Knee, Left [[id number]] Collected: 02/24/25 1000    Specimen: Synovial Fluid from Knee, Left Updated: 02/24/25 1004    Comprehensive Metabolic Panel [[id number]]  (Abnormal) Collected: 02/24/25 0723    Specimen: Blood Updated: 02/24/25 0847     Glucose 136 mg/dL      BUN 37 mg/dL      Creatinine 1.96 mg/dL      Sodium 137 mmol/L      Potassium 3.5 mmol/L      Chloride 100 mmol/L      CO2 24.7 mmol/L      Calcium 9.0 mg/dL      Total Protein 6.3 g/dL      Albumin 3.7 g/dL      ALT (SGPT) 131 U/L      AST (SGOT) 138 U/L      Alkaline Phosphatase 69 U/L      Total Bilirubin 1.2 mg/dL      Globulin 2.6 gm/dL      A/G Ratio 1.4 g/dL      BUN/Creatinine Ratio 18.9     Anion Gap 12.3 mmol/L       eGFR 33.5 mL/min/1.73     Narrative:      GFR Categories in Chronic Kidney Disease (CKD)      GFR Category          GFR (mL/min/1.73)    Interpretation  G1                     90 or greater         Normal or high (1)  G2                      60-89                Mild decrease (1)  G3a                   45-59                Mild to moderate decrease  G3b                   30-44                Moderate to severe decrease  G4                    15-29                Severe decrease  G5                    14 or less           Kidney failure          (1)In the absence of evidence of kidney disease, neither GFR category G1 or G2 fulfill the criteria for CKD.    eGFR calculation 2021 CKD-EPI creatinine equation, which does not include race as a factor    C-reactive Protein [[id number]]  (Abnormal) Collected: 02/24/25 0723    Specimen: Blood Updated: 02/24/25 0847     C-Reactive Protein 22.50 mg/dL     Blood Culture - Blood, Arm, Left [[id number]] Collected: 02/24/25 0820    Specimen: Blood from Arm, Left Updated: 02/24/25 0825    Sedimentation Rate [[id number]]  (Normal) Collected: 02/24/25 0723    Specimen: Blood Updated: 02/24/25 0758     Sed Rate 20 mm/hr     Protime-INR [[id number]]  (Abnormal) Collected: 02/24/25 0723    Specimen: Blood Updated: 02/24/25 0750     Protime 14.8 Seconds      INR 1.16    aPTT [[id number]]  (Normal) Collected: 02/24/25 0723    Specimen: Blood Updated: 02/24/25 0750     PTT 31.2 seconds     CBC & Differential [[id number]]  (Abnormal) Collected: 02/24/25 0723    Specimen: Blood Updated: 02/24/25 0744    Narrative:      The following orders were created for panel order CBC & Differential.  Procedure                               Abnormality         Status                     ---------                               -----------         ------                     CBC Auto Differential[[id number]]        Abnormal            Final result                 Please view results for these tests on the  individual orders.    CBC Auto Differential [[id number]]  (Abnormal) Collected: 02/24/25 0723    Specimen: Blood Updated: 02/24/25 0744     WBC 8.51 10*3/mm3      RBC 4.60 10*6/mm3      Hemoglobin 14.1 g/dL      Hematocrit 41.9 %      MCV 91.1 fL      MCH 30.7 pg      MCHC 33.7 g/dL      RDW 12.5 %      RDW-SD 41.7 fl      MPV 11.3 fL      Platelets 184 10*3/mm3      Neutrophil % 82.1 %      Lymphocyte % 6.0 %      Monocyte % 11.2 %      Eosinophil % 0.0 %      Basophil % 0.2 %      Immature Grans % 0.5 %      Neutrophils, Absolute 6.99 10*3/mm3      Lymphocytes, Absolute 0.51 10*3/mm3      Monocytes, Absolute 0.95 10*3/mm3      Eosinophils, Absolute 0.00 10*3/mm3      Basophils, Absolute 0.02 10*3/mm3      Immature Grans, Absolute 0.04 10*3/mm3      nRBC 0.0 /100 WBC     POC CHEM 8 [[id number]]  (Abnormal) Collected: 02/24/25 0730    Specimen: Venous Blood Updated: 02/24/25 0733     Glucose 135 mg/dL      BUN 37 mg/dL      Creatinine 2.10 mg/dL      Sodium 136 mmol/L      POC Potassium 3.3 mmol/L      Chloride 99 mmol/L      Total CO2 24 mmol/L      Anion Gap 16.0 mmol/L      Comment: Serial Number: [id number]:  [id number]        Hemoglobin 14.3 g/dL      Hematocrit 42 %      Ionized Calcium 1.12 mmol/L      eGFR 30.8 mL/min/1.73     POC Lactate [[id number]]  (Normal) Collected: 02/24/25 0725    Specimen: Blood Updated: 02/24/25 0726     Lactate 0.9 mmol/L      Comment: Serial Number: [id number]:  [id number]       Blood Culture - Blood, Arm, Left [[id number]] Collected: 02/24/25 0723    Specimen: Blood from Arm, Left Updated: 02/24/25 0724             I reviewed the patient's new clinical results.    Assessment & Plan    Left knee pain  Fever  Dehydration  NATALIO  Falls  General weakness  S/p revision of total knee, left  Essential tremor  Essential hypertension    Plan of care  Ortho asked ED to perform joint aspiration; pending. Respiratory panel negative. Fever, CRP elevated.   Dehydration/NATALIO nephrology consult  and give 1 liter NS now and continue with IV fluids.   Will hold BB as hr bruce and pressures soft.  Will continue rocephin iv for now and have MRSA swab obtained  He will need PT as he improves    He will need sleep study as outpatient as witnessed apnea events while resting       I discussed the patient's findings and my recommendations with patient.     Radha Garner, APRN  02/24/25  12:13 EST        Electronically signed by Vanessa King MD at 02/24/25 1950          Operative/Procedure Notes (all)        Wilmer Bruce MD at 02/25/25 0732  Version 1 of 1                Left knee arthrotomy irrigation debridement polyethylene exchange    Juan GOMEZ Donell  2/25/2025    Pre-op Diagnosis: Left knee periprosthetic joint.   Post-op Diagnosis: Same  Procedure:    Revision left knee single  component polyethylene insert with locking bar CPT code 94767    Arthrotomy of left knee with extensive synovectomy anterior including medial and lateral gutters for infection CPT code 99497  Surgeon:  Wilmer Bruce MD  Assistant: Destiny Funk PA-C  The services of a first assist were necessary for performing the procedure safely and expeditiously.  The first assist was present for the entire duration of the case and helped with positioning, retraction and closure of the incision.    Anesthesia: General, Anesthesiologist: Owen Lynch MD  CRNA: Maki Ybarra CRNA  Staff: Circulator: Shruthi Dougherty RN; Rubi Solorzano RN  Scrub Person: Dick Pratt  Vendor Representative: Chirag Simental  Assistant: Destiny Funk PA-C CFA  Estimated Blood Loss: 100ml  Specimens:   Order Name Source Comment Collection Info Order Time   ANAEROBIC CULTURE Knee, Left  Collected By: Wilmer Bruce MD 2/25/2025  7:38 AM     Release to patient   Routine Release        WOUND CULTURE Knee, Left  Collected By: Wilmer Bruce MD 2/25/2025  7:38 AM     Release to patient   Routine Release        TISSUE / BONE CULTURE Knee, Left  Collected By:  Wilmer Bruce MD 2/25/2025  7:43 AM     Release to patient   Routine Release        TISSUE / BONE CULTURE Knee, Left  Collected By: Wilmer Bruce MD 2/25/2025  7:43 AM     Release to patient   Routine Release          Drains: none  Complications: None    Components Utilized:    Implant Name Type Inv. Item Serial No.  Lot No. LRB No. Used Action   DEV CONTRL TISS STRATAFIX SPIRAL MNCRYL UD 3/0 PLS 30CM - [id number] Implant DEV CONTRL TISS STRATAFIX SPIRAL MNCRYL UD 3/0 PLS 30CM  [employer]  DIV OF J AND J [id number] Left 1 Implanted   DEV WND/CLS CONTRL TISS STRATAFIX SYMM PDS PLS CTX 60CM JULI - [id number] Implant DEV WND/CLS CONTRL TISS STRATAFIX SYMM PDS PLS CTX 60CM JULI  [employer]  DIV OF J AND J [id number] Left 1 Implanted   BEAR TIB/KN VANGUARD LIP VE CR 79/63M84XP - [id number] Implant BEAR TIB/KN VANGUARD LIP VE CR 79/81I23VJ  [employer] [id number] Left 1 Implanted   BAR TIB ASCENT/MAXIM AMADEO INTERLOK - [id number] Implant BAR TIB ASCENT/MAXIM AMADEO INTERLOK  [employer] [id number] Left 1 Implanted       Indication for Procedure:  This patient is a 84 y.o. male who has a history of a left knee revision polyethylene exchange about 1 month ago.  He had been doing very well however last week and late in the week he developed pain and swelling in the knee.  He had felt generalized malaise as well.  He also had 2 falls.  He presented to the emergency department where he was found to have NATALIO as well as hypokalemia.  He had an elevated CRP.  Sedimentation rate was normal he had no leukocytosis.  We did perform an aspiration of his knee which was consistent with periprosthetic joint infection with over 90,000 PMNs 96 5%.  He had no organisms on Gram stain but 4+ WBCs.  For these reasons I recommended proceeding to the operating room for irrigation debridement and modular exchange.  We discussed that the infection may or may not be eradicated.  If we are unable to fully eradicate the infection he would  likely need to return to the operating room for a two-stage exchange.  Surgical options and non-surgical options were discussed in detail and to the patient's satisfaction.  Surgical intervention was recommended based on the patient's injury and functional status.      The risks and benefits of surgery were discussed with patient and informed consent was obtained.  Risks include but are not limited to, infection, bleeding, nerve injury, blood clots, risks associated with anesthesia, need for further surgery, persistent pain, and possibly death.    Protocols for intravenous antibiotics and venous thrombosis were followed for this patient.  IV antibiotics were infused prior to surgery and will be discontinued within 24 hours of completion of the surgical procedure.       DESCRIPTION OF PROCEDURE:     The patient was seen in Preoperative Holding Area where their surgical site was marked. Preoperative antibiotics were received. H&P and consent updated. They were taken to the Operating Room and provided general anesthesia on the Operating Room table.  A well-padded nonsterile tourniquet is placed on the left proximal thigh.  The wound was examined it was intact there was no drainage however he did have some warmth to the knee and a large effusion.  The extremities prepped and draped in a circumferential two-stage fashion with alcohol followed by ChloraPrep.  Gloves were changed.  Formal surgical timeout confirmed the correct patient, procedure preformed laterality as well as she received tranexamic acid and was redosed ceftriaxone prior to incision.  He had been on scheduled antibiotics prior to surgery unfortunately.  All members of the team were in agreement.  Next the extremity was exsanguinated and tourniquet was applied to 250 mmHg.  I marked out the prior incision and sharply incised the skin and subcutaneous tissues using a 10 blade scalpel.  There was purulence noted in the prepatellar bursa.  This was debrided  using a Hyatt and rongeur.  It was noted that there was a small rent in the arthrotomy where it looked like infection had eroded through oriented perhaps torn with one of his falls.  The extensor mechanism however was grossly intact.  I then went ahead and made a medial parapatellar arthrotomy.  There was gross purulence within the knee as well as some synovitis.  Quite a bit of time was spent debriding the medial and lateral gutters using a Hyatt elevator as well as a rongeur.  I debrided all fibrous friable tissue to healthy appearing synovial and capsular tissue.  I then remove the polyethylene insert including the locking bar.  The notch was cleaned also protecting the posterior cruciate ligament.  I then irrigated the knee using 3 L of sterile saline with a Betadine solution I scrubbed the implants using a dry laparotomy sponge.  I then irrigated the knee using acetic acid bactisure solution.  This was allowed to sit for 3 minutes.  Gloves were changed.  A new polyethylene insert was engaged and the locking bar was engaged.  The knee was ranged it was very stable.  The tourniquet was deflated hemostasis was confirmed.  Final debridement was performed.  The knee was irrigated once again with additional sterile saline followed by additional Betadine solution and a surgeon for bottle.  1 g of vancomycin powder was placed within the knee.  A 10 Lao Hemovac drain was placed in the knee exiting superior laterally.    Second gram of tranexamic acid was administered.  The knee was then placed in a 90 degree flexed position.  The arthrotomy was then closed in a watertight fashion using #1 strata fix barbed suture.  The skin was then closed in a layered fashion and staples were utilized to close the skin edges.  Sterile silver impregnated occlusive dressing was applied    Postoperative Plan:  Patient be weightbearing as tolerated to the left lower extremity  Aspirin 81 mg twice daily for DVT prophylaxis in conjunction  with SCDs bilaterally    He is on scheduled antibiotics and infectious disease consult will be placed.  Suspect 6 weeks of IV antibiotic therapy and likely a 3-month consolidative course of oral antibiotics thereafter given his retention of implants.  Wilmer Bruce MD       Electronically signed by Wilmer Bruce MD at 02/25/25 0834          Physician Progress Notes (last 48 hours)        Radha Garner, APRN at 03/04/25 1350               LOS: 8 days   Patient Care Team:  Rolf Harvey MD as PCP - General (Family Medicine)  Cedric Jacobs MD as Consulting Physician (Nephrology)    Subjective     Patient states pain and mobility are improving    Review of Systems   Constitutional:  Positive for activity change and fatigue.   HENT: Negative.     Respiratory: Negative.     Cardiovascular: Negative.    Gastrointestinal: Negative.    Genitourinary: Negative.    Musculoskeletal:  Positive for gait problem. Negative for joint swelling.   Neurological:  Positive for weakness.   Psychiatric/Behavioral: Negative.             Objective     Vital Signs  Temp:  [97.4 °F (36.3 °C)-98.4 °F (36.9 °C)] 97.4 °F (36.3 °C)  Heart Rate:  [61-75] 72  Resp:  [17-19] 19  BP: (123-142)/(58-76) 128/58      Physical Exam  Vitals reviewed.   Constitutional:       Appearance: He is not ill-appearing.   HENT:      Head: Normocephalic and atraumatic.      Right Ear: External ear normal.      Left Ear: External ear normal.      Nose: Nose normal.      Mouth/Throat:      Mouth: Mucous membranes are moist.   Eyes:      General:         Right eye: No discharge.         Left eye: No discharge.   Cardiovascular:      Rate and Rhythm: Normal rate and regular rhythm.      Pulses: Normal pulses.      Heart sounds: Normal heart sounds.   Pulmonary:      Effort: Pulmonary effort is normal.      Breath sounds: Normal breath sounds.   Abdominal:      General: Bowel sounds are normal.      Palpations: Abdomen is soft.   Musculoskeletal:          General: Normal range of motion.      Cervical back: Normal range of motion.   Skin:     General: Skin is warm and dry.   Neurological:      Mental Status: He is alert and oriented to person, place, and time.   Psychiatric:         Behavior: Behavior normal.              Results Review:    Lab Results (last 24 hours)       Procedure Component Value Units Date/Time    Renal Function Panel [[id number]]  (Abnormal) Collected: 03/04/25 0412    Specimen: Blood from Arm, Left Updated: 03/04/25 0512     Glucose 132 mg/dL      BUN 10 mg/dL      Creatinine 0.92 mg/dL      Sodium 136 mmol/L      Potassium 4.1 mmol/L      Chloride 106 mmol/L      CO2 20.5 mmol/L      Calcium 9.1 mg/dL      Albumin 3.0 g/dL      Phosphorus 3.0 mg/dL      Anion Gap 9.5 mmol/L      BUN/Creatinine Ratio 10.9     eGFR 83.1 mL/min/1.73     Narrative:      GFR Categories in Chronic Kidney Disease (CKD)      GFR Category          GFR (mL/min/1.73)    Interpretation  G1                     90 or greater         Normal or high (1)  G2                      60-89                Mild decrease (1)  G3a                   45-59                Mild to moderate decrease  G3b                   30-44                Moderate to severe decrease  G4                    15-29                Severe decrease  G5                    14 or less           Kidney failure          (1)In the absence of evidence of kidney disease, neither GFR category G1 or G2 fulfill the criteria for CKD.    eGFR calculation 2021 CKD-EPI creatinine equation, which does not include race as a factor             Imaging Results (Last 24 Hours)       ** No results found for the last 24 hours. **                 I reviewed the patient's new clinical results.    Medication Review:   Scheduled Meds:aspirin, 81 mg, Oral, Daily  cefTRIAXone, 2,000 mg, Intravenous, Q24H  dicyclomine, 10 mg, Oral, TID  [Held by provider] gabapentin, 100 mg, Oral, Q12H  lisinopril, 10 mg, Oral, Q24H  metoprolol succinate  XL, 25 mg, Oral, Q24H  pantoprazole, 40 mg, Oral, Q AM  sodium chloride, 10 mL, Intravenous, Q12H  tamsulosin, 0.4 mg, Oral, Q PM  vitamin D, 50,000 Units, Oral, Q7 Days      Continuous Infusions:   PRN Meds:.  acetaminophen **OR** acetaminophen **OR** acetaminophen    senna-docusate sodium **AND** polyethylene glycol **AND** bisacodyl **AND** bisacodyl    Calcium Replacement - Follow Nurse / BPA Driven Protocol    diphenhydrAMINE-zinc acetate    hydrALAZINE    HYDROcodone-acetaminophen    HYDROmorphone    hydrOXYzine    Magnesium Standard Dose Replacement - Follow Nurse / BPA Driven Protocol    melatonin    nitroglycerin    ondansetron ODT **OR** ondansetron    Phosphorus Replacement - Follow Nurse / BPA Driven Protocol    Potassium Replacement - Follow Nurse / BPA Driven Protocol    sodium chloride    sodium chloride     Interval History:    Assessment & Plan     Pyogenic arthritis of left knee joint  S/P revision of total knee, left  Knee pain  Essential tremor  Essential hypertension  Dehydration  NATALIO (acute kidney injury)  Fever  Generalized weakness  Confusion   Abnormal liver function test    Plan of care  Left total knee infection. Patient status post aspiration in the ED that showed over 93,000 total nucleated cells and purulence was seen both at the prepatellar bursa and deeper into the knee during an incision and drainage and liner exchange on 2/25/2025     Continue IV ceftriaxone 2 g daily for 6 weeks. Last day will be April 7, 2025.   Cultures grew group B streptococcus    LFT improving liver ultrasound unremarkable  Altered mental status resolved and r/t medication    Plan for disposition:SNF medically ready for dc    GONZALO Pineda  03/04/25  13:50 EST          Electronically signed by Radha Garner APRN at 03/04/25 1350       Maria Fernanda Obrien PA-C at 03/03/25 1225       Attestation signed by Erum Casey MD at 03/03/25 1920    I have reviewed this documentation and agree.                        LOS: 7 days   Patient Care Team:  Rolf Harvey MD as PCP - General (Family Medicine)  Cedric Jacobs MD as Consulting Physician (Nephrology)    Subjective     Interval History: Stable overnight     Patient Complaints: No voiced complaints, eager for dicharge    History taken from: patient    Review of Systems   Constitutional:  Positive for activity change. Negative for fever.   HENT:  Negative for trouble swallowing.    Eyes:  Negative for visual disturbance.   Respiratory:  Negative for cough and shortness of breath.    Cardiovascular:  Negative for chest pain, palpitations and leg swelling.   Gastrointestinal:  Negative for abdominal pain, constipation and diarrhea.   Endocrine: Negative for polyuria.   Genitourinary:  Negative for difficulty urinating.   Musculoskeletal:  Positive for arthralgias and gait problem.   Neurological:  Positive for weakness.   Psychiatric/Behavioral:  Negative for confusion.            Objective     Vital Signs  Temp:  [98 °F (36.7 °C)-99 °F (37.2 °C)] 98 °F (36.7 °C)  Heart Rate:  [61-68] 61  Resp:  [16-19] 16  BP: (135-186)/(57-70) 186/66    Physical Exam:     General Appearance:    Alert, cooperative, in no acute distress,   Head:    Normocephalic, without obvious abnormality, atraumatic   Eyes:            Lids and lashes normal, conjunctivae and sclerae normal, no   icterus, no pallor, corneas clear   Ears:    Ears appear intact with no abnormalities noted   Throat:   No oral lesions, no thrush, oral mucosa moist   Neck:   No adenopathy, supple, trachea midline, no thyromegaly, no   carotid bruit, no JVD   Lungs:     Clear to auscultation,respirations regular, even and unlabored    Heart:    Regular rhythm and normal rate, normal S1 and S2, no murmur, no gallop, no rub, no click   Chest Wall:    No abnormalities observed   Abdomen:     Normal bowel sounds, no masses, no organomegaly, soft Non-tender non-distended, no guarding,   Extremities:   Decreased ROM left  knee. Left leg appropriately wrapped. No edema, no cyanosis, no redness   Pulses:   Pulses palpable and equal bilaterally   Skin:   No bleeding, bruising or rash   Lymph nodes:   No palpable adenopathy            Results Review:    Lab Results (last 24 hours)       Procedure Component Value Units Date/Time    Renal Function Panel [[id number]]  (Abnormal) Collected: 03/03/25 0500    Specimen: Blood from Arm, Left Updated: 03/03/25 0603     Glucose 112 mg/dL      BUN 11 mg/dL      Creatinine 0.96 mg/dL      Sodium 136 mmol/L      Potassium 4.1 mmol/L      Chloride 104 mmol/L      CO2 21.7 mmol/L      Calcium 8.8 mg/dL      Albumin 3.2 g/dL      Phosphorus 2.8 mg/dL      Anion Gap 10.3 mmol/L      BUN/Creatinine Ratio 11.5     eGFR 78.9 mL/min/1.73     Narrative:      GFR Categories in Chronic Kidney Disease (CKD)      GFR Category          GFR (mL/min/1.73)    Interpretation  G1                     90 or greater         Normal or high (1)  G2                      60-89                Mild decrease (1)  G3a                   45-59                Mild to moderate decrease  G3b                   30-44                Moderate to severe decrease  G4                    15-29                Severe decrease  G5                    14 or less           Kidney failure          (1)In the absence of evidence of kidney disease, neither GFR category G1 or G2 fulfill the criteria for CKD.    eGFR calculation 2021 CKD-EPI creatinine equation, which does not include race as a factor             Imaging Results (Last 24 Hours)       ** No results found for the last 24 hours. **                 I reviewed the patient's new clinical results.    Medication Review:   Scheduled Meds:aspirin, 81 mg, Oral, Daily  cefTRIAXone, 2,000 mg, Intravenous, Q24H  dicyclomine, 10 mg, Oral, TID  [Held by provider] gabapentin, 100 mg, Oral, Q12H  pantoprazole, 40 mg, Oral, Q AM  sodium chloride, 10 mL, Intravenous, Q12H  tamsulosin, 0.4 mg, Oral, Q  PM  vitamin D, 50,000 Units, Oral, Q7 Days      Continuous Infusions:   PRN Meds:.  acetaminophen **OR** acetaminophen **OR** acetaminophen    senna-docusate sodium **AND** polyethylene glycol **AND** bisacodyl **AND** bisacodyl    Calcium Replacement - Follow Nurse / BPA Driven Protocol    diphenhydrAMINE-zinc acetate    hydrALAZINE    HYDROcodone-acetaminophen    HYDROmorphone    hydrOXYzine    Magnesium Standard Dose Replacement - Follow Nurse / BPA Driven Protocol    melatonin    nitroglycerin    ondansetron ODT **OR** ondansetron    Phosphorus Replacement - Follow Nurse / BPA Driven Protocol    Potassium Replacement - Follow Nurse / BPA Driven Protocol    sodium chloride    sodium chloride     Assessment & Plan       Pyogenic arthritis of left knee joint    S/P revision of total knee, left    Knee pain    Essential tremor    Essential hypertension    Dehydration    NATALIO (acute kidney injury)    Fever    Generalized weakness    Confusion    Abnormal liver function test    Benign hypertension with chronic kidney disease, stage III      Pyogenic arthritis left knee/S/P total knee revision left knee  -IV ceftriaxone x 6 weeks, last day 2025  -Cx grew group B strep  -PT/OT  -Waiting on SNF placement    Hematuria  -episode of hematuria yesterday, repeat UA normal  -urine culture pending    DVT prophylaxis  -SCDs    Plan for disposition:SNF    Maria Fernanda Obrien PA-C  25  12:25 EST          Electronically signed by Erum Casey MD at 25 [phone]       Cedric Jacobs MD at 25 1050          NEPHROLOGY PROGRESS NOTE------[facility]    [facility]  [phone]  Cedric Jacobs MD      Patient Care Team:  Rolf Harvey MD as PCP - General (Family Medicine)  Cedric Jacobs MD as Consulting Physician (Nephrology)      Provider:  Cedric Jacobs MD  Patient Name: Juan Marley  :  1940    SUBJECTIVE:    F/U ARF/NATALIO/CRF/CKD   No  "SOB, CP, dysuria. No edema. Appetite good.     Medication:  aspirin, 81 mg, Oral, Daily  cefTRIAXone, 2,000 mg, Intravenous, Q24H  dicyclomine, 10 mg, Oral, TID  [Held by provider] gabapentin, 100 mg, Oral, Q12H  pantoprazole, 40 mg, Oral, Q AM  sodium chloride, 10 mL, Intravenous, Q12H  tamsulosin, 0.4 mg, Oral, Q PM  vitamin D, 50,000 Units, Oral, Q7 Days             OBJECTIVE    Vital Sign Min/Max for last 24 hours  Temp  Min: 98 °F (36.7 °C)  Max: 99 °F (37.2 °C)   BP  Min: 135/57  Max: 160/70   Pulse  Min: 54  Max: 68   Resp  Min: 16  Max: 19   SpO2  Min: 93 %  Max: 99 %   No data recorded   No data recorded     Flowsheet Rows      Flowsheet Row First Filed Value   Admission Height 177.8 cm (70\") Documented at 02/24/2025 0617   Admission Weight 87 kg (191 lb 12.8 oz) Documented at 02/24/2025 0617            I/O this shift:  In: -   Out: 200 [Urine:200]  I/O last 3 completed shifts:  In: 360 [P.O.:360]  Out: 500 [Urine:500]    Physical Exam:  General Appearance: alert, appears stated age and cooperative  Head: normocephalic, without obvious abnormality and atraumatic  Eyes: conjunctivae and sclerae normal and no icterus  Neck: supple and no JVD  Lungs: clear to auscultation and respirations regular  Heart: regular rhythm & normal rate and normal S1, S2 +NICOLE  Chest: Wall no abnormalities observed  Abdomen: normal bowel sounds and soft, nontender  Extremities: moves extremities well, no edema, no cyanosis and no redness  Skin: no bleeding, bruising or rash, turgor normal, color normal and no lesions noted  Neurologic: Alert, and oriented. No focal deficits    Labs:    WBC WBC   Date Value Ref Range Status   03/02/2025 5.80 3.40 - 10.80 10*3/mm3 Final      HGB Hemoglobin   Date Value Ref Range Status   03/02/2025 11.7 (L) 13.0 - 17.7 g/dL Final      HCT Hematocrit   Date Value Ref Range Status   03/02/2025 35.1 (L) 37.5 - 51.0 % Final      Platelets No results found for: \"LABPLAT\"   MCV MCV   Date Value Ref Range " "Status   03/02/2025 91.9 79.0 - 97.0 fL Final          Sodium Sodium   Date Value Ref Range Status   03/03/2025 136 136 - 145 mmol/L Final   03/02/2025 139 136 - 145 mmol/L Final   02/28/2025 140 136 - 145 mmol/L Final      Potassium Potassium   Date Value Ref Range Status   03/03/2025 4.1 3.5 - 5.2 mmol/L Final   03/02/2025 4.0 3.5 - 5.2 mmol/L Final   02/28/2025 4.1 3.5 - 5.2 mmol/L Final      Chloride Chloride   Date Value Ref Range Status   03/03/2025 104 98 - 107 mmol/L Final   03/02/2025 106 98 - 107 mmol/L Final   02/28/2025 108 (H) 98 - 107 mmol/L Final      CO2 CO2   Date Value Ref Range Status   03/03/2025 21.7 (L) 22.0 - 29.0 mmol/L Final   03/02/2025 22.4 22.0 - 29.0 mmol/L Final   02/28/2025 21.5 (L) 22.0 - 29.0 mmol/L Final      BUN BUN   Date Value Ref Range Status   03/03/2025 11 8 - 23 mg/dL Final   03/02/2025 14 8 - 23 mg/dL Final   02/28/2025 14 8 - 23 mg/dL Final      Creatinine Creatinine   Date Value Ref Range Status   03/03/2025 0.96 0.76 - 1.27 mg/dL Final   03/02/2025 1.02 0.76 - 1.27 mg/dL Final   02/28/2025 1.00 0.76 - 1.27 mg/dL Final      Calcium Calcium   Date Value Ref Range Status   03/03/2025 8.8 8.6 - 10.5 mg/dL Final   03/02/2025 8.5 (L) 8.6 - 10.5 mg/dL Final   02/28/2025 8.4 (L) 8.6 - 10.5 mg/dL Final      PO4 No components found for: \"PO4\"   Albumin Albumin   Date Value Ref Range Status   03/03/2025 3.2 (L) 3.5 - 5.2 g/dL Final   03/02/2025 3.1 (L) 3.5 - 5.2 g/dL Final   02/28/2025 3.0 (L) 3.5 - 5.2 g/dL Final      Magnesium Magnesium   Date Value Ref Range Status   03/02/2025 2.0 1.6 - 2.4 mg/dL Final        Uric Acid No components found for: \"URIC ACID\"     Imaging Results (Last 72 Hours)       ** No results found for the last 72 hours. **            Results for orders placed during the hospital encounter of 02/24/25    XR Knee 1 or 2 View Left    Narrative  XR KNEE 1 OR 2 VW LEFT    Date of Exam: 2/25/2025 9:26 AM EST    Indication: post op    Comparison: Left knee radiograph " 2/24/2025    Findings:  Total knee arthroplasty in anatomic alignment. Small less conspicuous joint effusion as well as decreasing soft tissue edema most significant along the anterior aspect of the knee joint. No evidence of loosening, hardware fracture or periprosthetic  fracture. Surgical drain noted.    Impression  Impression:  Decreasing soft tissue swelling and joint fluid. Radiographically uncomplicated total arthroplasty in anatomic alignment.      Electronically Signed: Erick Gilmore MD  2/25/2025 9:36 AM EST  Workstation ID: [id number]      XR Chest 1 View    Narrative  XR CHEST 1 VW    Date of Exam: 2/25/2025 5:25 AM EST    Indication: pre op    Comparison: 9/3/2015    Findings:  Heart size normal. Mild left basilar airspace disease favor atelectasis. The right lung is clear. No pneumothorax. No significant effusion. Osseous structures appear intact.    Impression  Impression:  Mild left basilar airspace disease favor atelectasis.          Electronically Signed: Reynold Marks MD  2/25/2025 7:02 AM EST  Workstation ID: [id number]      XR Knee 4+ View Left    Narrative  XR KNEE 4+ VW LEFT    Date of Exam: 2/24/2025 7:12 AM EST    Indication: pain, redness, swelling s/p TKR revision    Comparison: January 17, 2025, January 29, 2025    Findings:  Status post knee arthroplasty. Hardware components appear in stable and expected positions. No definite acute hardware complication.    There appears to be a small to moderate knee effusion. There is anterior soft tissue prominence overlying the patella and extensor tendons. There also appears to be diffuse subcutaneous edema. No definite acute osseous abnormality.    Impression  Impression:  1.Status post knee arthroplasty.  No definite acute hardware or osseous complication.  2.Nonspecific small to moderate knee effusion, subcutaneous edema, and anterior soft tissue prominence. Joint or soft tissue infection should be considered based upon patient's reported symptoms.  Correlate with clinical findings, lab values, and consider  joint aspiration.          Electronically Signed: Adrien Alvarenga  2/24/2025 7:23 AM EST  Workstation ID: [id number]      Results for orders placed during the hospital encounter of 02/24/25    Duplex Venous Lower Extremity - Left CAR    Interpretation Summary    Normal left lower extremity venous duplex scan.        ASSESSMENT / PLAN      Pyogenic arthritis of left knee joint    S/P revision of total knee, left    Knee pain    Essential tremor    Essential hypertension    Dehydration    NATALIO (acute kidney injury)    Fever    Generalized weakness    Confusion    Abnormal liver function test    Benign hypertension with chronic kidney disease, stage III    ARF/NATALIO/CRF/CKD-------Nonoliguric. ARF/NATALIO resolved. +ARF/NATALIO on top of known CRF/CKD STG 3A. CRF/CKD STG 3A secondary to HTN NS. +ARF/NATALIO secondary to prerenal state and concomitant diuretic/ACE-I use. Off of these.  No NSAIDs or IV dye.     2. HTN WITH CKD-----BP good. Avoid hypotension. No ACE-I/ARB/DRI    3. HYPOCALCEMIA------Replace IV    4. RECENT LEFT KNEE SURGERY WITH INFECTION    5. ANEMIA OF CKD------H/H stable    6. ACIDOSIS----Type 4 RTA. Better with giving po NaHCO3 and follow    7. VITAMIN D DEFICIENCY    8. HYPOPHOSPHATEMIA------replaced    9. HEMATURIA-----One episode. ? UTI. C and S pending.  On Flomax and abx    CR stable  Awaits rehab      Cedric Jacobs MD  [facility]  [phone]  03/03/25  10:50 EST      Electronically signed by Cedric Jacobs MD at 03/03/25 1402       Consult Notes (last 48 hours)  Notes from 03/02/25 1414 through 03/04/25 1414   No notes of this type exist for this encounter.       Nutrition Notes (most recent note)    No notes exist for this encounter.       Speech Language Pathology Notes (most recent note)    No notes exist for this encounter.       Maria M Rossi, PT   Physical Therapist  Specialty:  Physical Therapy  Therapy Evaluation      Signed  Date of Service:  25 1142  Creation Time:  25 1142     Signed        Expand All Collapse All  Patient Name: Juan Marley                      : 1940                      MRN: [id number]                              Today's Date: 2025                                   Admit Date: 2025                        Visit Dx:   Visit Diagnosis       ICD-10-CM ICD-9-CM   1. Acute pain of left knee  M25.562 719.46   2. Pyogenic arthritis of left knee joint, due to unspecified organism  M00.9 711.06   3. S/P revision of total knee, left  Z96.652 V43.65   4. Acute kidney injury  N17.9 584.9   5. Infection of total left knee replacement  T84.54XA 996.66         Problem List       Patient Active Problem List   Diagnosis    S/P revision of total knee, left    Knee pain    Essential tremor    Essential hypertension    Dehydration    NAATLIO (acute kidney injury)    Fever    Generalized weakness    Pyogenic arthritis of left knee joint    Confusion    Abnormal liver function test         Medical History        Past Medical History:   Diagnosis Date    Enlarged prostate      Essential tremor       BOTH HANDS    Hypertension      Knee pain, left      Rotator cuff tear, left      Wears dentures           Surgical History         Past Surgical History:   Procedure Laterality Date    CATARACT EXTRACTION, BILATERAL        COLONOSCOPY        ELBOW OLECRANNON BURSA EXCISION Right       X2    ENDOSCOPY        KNEE MINI REVISION Left 2025     Procedure: LEFT  TOTAL KNEE ARTHROPLASTY REVISION POLYETHYLENE EXCHANGE;  Surgeon: Wilmer Bruce MD;  [place] [facility] [place];  Service: Orthopedics;  Laterality: Left;    SPINE SURGERY        SPINAL STENOSIS    SPINE SURGERY   2016     RUPTURED DISC    TONSILLECTOMY AND ADENOIDECTOMY         AGES 20S    TOTAL KNEE ARTHROPLASTY Left 2008    VASECTOMY               General Information         Row Name 25 1022                 Physical Therapy Time and  Intention     Document Type evaluation  -       Mode of Treatment physical therapy  -          Row Name 02/26/25 1022                 General Information     Patient Profile Reviewed yes  -       Prior Level of Function independent:;all household mobility;community mobility;ADL's;driving;home management  pt is current with outpatient therapy,and was progressing well.  -       Existing Precautions/Restrictions fall  -       Barriers to Rehab none identified  -[facility] Name 02/26/25 1022                 Living Environment     People in Home alone  -[facility] Name 02/26/25 1022                 Home Main Entrance     Number of Stairs, Main Entrance one  -[facility] Name 02/26/25 1022                 Stairs Within Home, Primary     Number of Stairs, Within Home, Primary none  -[facility] Name 02/26/25 1022                 Cognition     Orientation Status (Cognition) oriented to;person;situation;place;disoriented to;time  fluctuating confusion since surgery. Seems to tolerate pain medicine poorly, resulting in confusion and poor safety awareness/sequencing.  -[facility] Name 02/26/25 1022                 Safety Issues/Impairments Affecting Functional Mobility     Safety Issues Affecting Function (Mobility) ability to follow commands;at risk behavior observed;friction/shear risk;problem-solving;judgment;positioning of assistive device;safety precaution awareness;sequencing abilities  -       Impairments Affecting Function (Mobility) strength;pain;range of motion (ROM);endurance/activity tolerance;balance;cognition  -                       User Key  (r) = Recorded By, (t) = Taken By, (c) = Cosigned By        Initials Name Provider Type      Maria M Rossi PT Physical Therapist                            Mobility         Row Name 02/26/25 1122                 Bed Mobility     Bed Mobility bed mobility (all) activities  -       All Activities, [place] (Bed Mobility) contact  guard;minimum assist (75% patient effort)  -       Comment, (Bed Mobility) supine to sit with compensatory technique RLE ankle supporting LLE and assisting to advance over the EOB. Pt with difficulty pushing up to sitting, requiring support at trunk (Irina) to not fall back down onto R side.  -[facility] Name 02/26/25 1122                 Transfers     Comment, (Transfers) max cues for technique. Requires PT assist to progress the walker.  -AH          Row Name 02/26/25 1122                 Bed-Chair Transfer     Bed-Chair Lopez (Transfers) moderate assist (50% patient effort);2 person assist  -       Assistive Device (Bed-Chair Transfers) walker, front-wheeled  -[facility] Name 02/26/25 1122                 Sit-Stand Transfer     Sit-Stand Lopez (Transfers) moderate assist (50% patient effort);2 person assist  -       Assistive Device (Sit-Stand Transfers) walker, front-wheeled  -       Comment, (Sit-Stand Transfer) max cues needed for set up and hand placement.  -AH          Row Name 02/26/25 1122                 Gait/Stairs (Locomotion)     Patient was able to Ambulate no, other medical factors prevent ambulation  -       Reason Patient was unable to Ambulate Uncontrolled Pain;Excessive Weakness  unable to WB on LLE  -                       User Key  (r) = Recorded By, (t) = Taken By, (c) = Cosigned By        Initials Name Provider Type      Maria M Rossi, PT Physical Therapist                            Obj/Interventions         [facility] Name 02/26/25 1126                 Range of Motion Comprehensive     Comment, General Range of Motion RLE WFL. LLE  ~75% limited. L knee AROM 0-20-50.  Simultaneous filing. User may be unaware of other data.  -[facility] Name 02/26/25 1126                 Strength Comprehensive (MMT)     General Manual Muscle Testing (MMT) Assessment lower extremity strength deficits identified  -       Comment, General Manual Muscle Testing (MMT)  Assessment LLE grossly 2-/5, RLE WFL.  Simultaneous filing. User may be unaware of other data.  -[facility] Name 02/26/25 1126                 Motor Skills     Motor Skills functional endurance  -       Functional Endurance FAIR (-)  -[facility] Name 02/26/25 1126                 Balance     Balance Assessment sitting static balance;standing dynamic balance;standing static balance;sitting dynamic balance  -       Static Sitting Balance supervision  -       Dynamic Sitting Balance contact guard  -       Position, Sitting Balance sitting edge of bed;supported  heavy reliance on UE support on bedrail.  -       Static Standing Balance moderate assist;2-person assist  -       Dynamic Standing Balance moderate assist;2-person assist  -       Position/Device Used, Standing Balance walker, front-wheeled  -       Comment, Balance impaired posture, fear, and poor LLE WB tolerance negatively impact standing balance  -AH          Row Name 02/26/25 1126                 Sensory Assessment (Somatosensory)     Sensory Assessment (Somatosensory) sensation intact  -                       User Key  (r) = Recorded By, (t) = Taken By, (c) = Cosigned By        Initials Name Provider Type      Maria M Rossi, PT Physical Therapist                            Goals/Plan         Row Name 02/26/25 1139                 Bed Mobility Goal 1 (PT)     Activity/Assistive Device (Bed Mobility Goal 1, PT) sit to supine/supine to sit  -       [place] Level/Cues Needed (Bed Mobility Goal 1, PT) modified independence  -       Time Frame (Bed Mobility Goal 1, PT) long term goal (LTG);2 weeks  -[facility] Name 02/26/25 1139                 Transfer Goal 1 (PT)     Activity/Assistive Device (Transfer Goal 1, PT) sit-to-stand/stand-to-sit;bed-to-chair/chair-to-bed;walker, rolling  -       [place] Level/Cues Needed (Transfer Goal 1, PT) modified independence  -       Time Frame (Transfer Goal 1, PT) long  term goal (LTG);2 weeks  -AH          Row Name 02/26/25 1139                 Gait Training Goal 1 (PT)     Activity/Assistive Device (Gait Training Goal 1, PT) gait (walking locomotion);walker, rolling  -       [place] Level (Gait Training Goal 1, PT) modified independence  -       Distance (Gait Training Goal 1, PT) 50'  -       Time Frame (Gait Training Goal 1, PT) long term goal (LTG);2 weeks  -AH          Row Name 02/26/25 1139                 ROM Goal 1 (PT)     ROM Goal 1 (PT) Surgical Knee ROM: 0-90  -       Time Frame (ROM Goal 1, PT) long-term goal (LTG);2 weeks  -AH          Row Name 02/26/25 1139                 Stairs Goal 1 (PT)     Activity/Assistive Device (Stairs Goal 1, PT) stairs, all skills  -       [place] Level/Cues Needed (Stairs Goal 1, PT) modified independence  -       Number of Stairs (Stairs Goal 1, PT) 1  -       Time Frame (Stairs Goal 1, PT) long term goal (LTG);2 weeks  -AH          Row Name 02/26/25 1139                 Therapy Assessment/Plan (PT)     Planned Therapy Interventions (PT) balance training;bed mobility training;gait training;home exercise program;ROM (range of motion);patient/family education;stair training;strengthening;stretching;transfer training  [facility]                    User Key  (r) = Recorded By, (t) = Taken By, (c) = Cosigned By        Initials Name Provider Type      Maria M Rossi, PT Physical Therapist                         Clinical Impression         Row Name 02/26/25 1130                 Pain     Additional Documentation Pain Scale: FACES Pre/Post-Treatment (Group)  -AH          Row Name 02/26/25 1130                 Pain Scale: FACES Pre/Post-Treatment     Pain: FACES Scale, Pretreatment 6-->hurts even more  -       Posttreatment Pain Rating 8-->hurts whole lot  -AH          Row Name 02/26/25 1130                 Plan of Care Review     Plan of Care Reviewed With patient  -       Outcome Evaluation Pt is an 83 y/o male ~4 wks  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 He's now POD 1 left polyethylene insert exchange and washout. WBAT LLE. Pt lives alone in a H with 1 small SUZANEN. He needed assistance initially after his revision but had since progressed to being independent with all mobility, including driving himself to/from outpatient therapy. Pt had a fall on 2/23/25. He does not have 24/7 care/assist available. Per family report pt is usually alert and oriented with no confusion. He presents today anxious and mildly confused. He requires modA x2 for sit to stand transfers, bed to BSC, and BSC to recliner with RW. Pt is unable to tolerate WB on LLE and with significant difficulty advancing the LLE. He is also unable to advance with walker during pivot turns. Max cues are needed for sequencing and max reassurance provided for all OOB activity. Pt is a very high fall risk and is not safe to DC home at alone at this time. PT recommends SNF. PT will follow.  -          Row Name 02/26/25 1130                 Therapy Assessment/Plan (PT)     Patient/Family Therapy Goals Statement (PT) get stronger, less pain, be able to walk.  -       Rehab Potential (PT) good  -       Criteria for Skilled Interventions Met (PT) yes;meets criteria  -       Therapy Frequency (PT) 5 times/wk  -          Row Name 02/26/25 1130                 Positioning and Restraints     Post Treatment Position chair  -       In Chair notified nsg;reclined;call light within reach;encouraged to call for assist;exit alarm on;with other staff  cold packs applied to Lknee. Hemovac intact. Transfer techniques discussed with nurse and nursing assistant. Assist x2 recommended.  -                       User Key  (r) = Recorded By, (t) = Taken By, (c) = Cosigned By        Initials  Name Provider Type     Maria M Cerda, PT Physical Therapist                            Outcome Measures         Row Name 02/26/25 1140 02/26/25 0839            How much help from another person do you currently need...     Turning from your back to your side while in flat bed without using bedrails? 3  -AH 3  -LP     Moving from lying on back to sitting on the side of a flat bed without bedrails? 3  -AH 3  -LP     Moving to and from a bed to a chair (including a wheelchair)? 2  - 3  -LP     Standing up from a chair using your arms (e.g., wheelchair, bedside chair)? 2  - 3  -LP     Climbing 3-5 steps with a railing? 1  - 2  -LP     To walk in hospital room? 1  - 2  -LP     AM-PAC 6 Clicks Score (PT) 12  - 16  -LP     Highest Level of Mobility Goal 4 --> Transfer to chair/commode  - 5 --> Static standing  -LP        Row Name 02/26/25 1140                 Functional Assessment     Outcome Measure Options AM-PAC 6 Clicks Basic Mobility (PT)  -                       User Key  (r) = Recorded By, (t) = Taken By, (c) = Cosigned By        Initials Name Provider Type     Maria M Cerda, PT Physical Therapist     Jolanta Dao LPN Licensed Nurse                          Physical Therapy Education            Title: PT OT SLP Therapies (Done)         Topic: Physical Therapy (Done)         Point: Mobility training (Done)         Learning Progress Summary             Patient Acceptance, E, VU by  at 2/26/2025 1141                            Point: Home exercise program (Done)         Learning Progress Summary             Patient Acceptance, E, VU by  at 2/26/2025 1141                            Point: Body mechanics (Done)         Learning Progress Summary             Patient Acceptance, E, VU by  at 2/26/2025 1141                            Point: Precautions (Done)         Learning Progress Summary             Patient Acceptance, E, VU by  at 2/26/2025 1141                                                 User Key         Initials Effective Dates Name Provider Type Discipline      12/04/23 -  Maria M Rossi, PT Physical Therapist PT                          PT Recommendation and Plan  Planned Therapy Interventions (PT): balance training, bed mobility training, gait training, home exercise program, ROM (range of motion), patient/family education, stair training, strengthening, stretching, transfer training  Outcome Evaluation: Pt is an 83 y/o male ~4 wks 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 He's now POD 1 left polyethylene insert exchange and washout. WBAT LLE. Pt lives alone in a H with 1 small SUZANNE. He needed assistance initially after his revision but had since progressed to being independent with all mobility, including driving himself to/from outpatient therapy. Pt had a fall on 2/23/25. He does not have 24/7 care/assist available. Per family report pt is usually alert and oriented with no confusion. He presents today anxious and mildly confused. He requires modA x2 for sit to stand transfers, bed to BSC, and BSC to recliner with RW. Pt is unable to tolerate WB on LLE and with significant difficulty advancing the LLE. He is also unable to advance with walker during pivot turns. Max cues are needed for sequencing and max reassurance provided for all OOB activity. Pt is a very high fall risk and is not safe to DC home at alone at this time. PT recommends SNF. PT will follow.      Time Calculation:        PT Charges         Row Name 02/26/25 1141                       Time Calculation     Start Time 1006  -         Stop Time 1037  -         Time Calculation (min) 31 min  -         PT Non-Billable Time (min) 0 min  -         PT Received On 02/26/25  -         PT - Next Appointment 02/27/25  -          PT Goal Re-Cert Due Date 03/12/25  -                 Time Calculation- PT     Total Timed Code Minutes- PT 10 minute(s)  -                      User Key  (r) = Recorded By, (t) = Taken By, (c) = Cosigned By        Initials Name Provider Type      Maria M Rossi, PT Physical Therapist                       Therapy Charges for Today         Code Description Service Date Service Provider Modifiers Qty     30245595795 HC PT EVAL MOD COMPLEXITY 4 2/26/2025 Maria M Rossi, PT GP 1     55463445009 HC PT THERAPEUTIC ACT EA 15 MIN 2/26/2025 Maria M Rossi, PT GP 1                PT G-Codes  Outcome Measure Options: AM-PAC 6 Clicks Basic Mobility (PT)  AM-PAC 6 Clicks Score (PT): 12  PT Discharge Summary  Anticipated Discharge Disposition (PT): skilled nursing facility     Maria M Rossi PT               2/26/2025                                    Feroz Francois, PTA   Physical Therapist Assistant  Physical Therapy  Therapy Treatment Note     Signed  Date of Service:  03/04/25 1125  Creation Time:  03/04/25 1125     Signed        Subjective: Pt supine upon arrival. Agreeable to therapeutic plan of care.     Objective:      Bed mobility - Supine to sitting SBA     Transfers - STS SBA using RW     Ambulation - 80 feet CGA using RW. Slight antalgic gait pattern with decreased heel strike.      Therapeutic Exercise - 10 Reps L Lower Extremity AAROM lying supine, supported sitting / chair, and standing     Vitals: WNL     Pain: 5 VAS Location: L knee - medial aspect.   Intervention for pain: RN notified, Increased Activity, and Therapeutic Presence     Education: Provided education on the importance of mobility in the acute care setting, Verbal/Tactile Cues, Transfer Training, and Gait Training     Assessment: Juan Marley presents with functional mobility impairments which indicate the need for skilled intervention. Tolerating session today without incident. Pt progressing well with PT POC. Notable  "improvement in weight bearing tolerance through LLE during upright activities and ambulation. Pt not safe to d/c home alone; thus, continue to recommend SNF at discharge. Will continue to follow and progress as tolerated.      Plan/Recommendations:   If medically appropriate, Moderate Intensity Therapy recommended post-acute care. This is recommended as therapy feels the patient would require 3-4 days per week and wouldn't tolerate \"3 hour daily\" rehab intensity. SNF would be the preferred choice. If the patient does not agree to SNF, arrange HH or OP depending on home bound status. If patient is medically complex, consider LTACH. Pt requires no DME at discharge.      Pt desires Skilled Rehab placement at discharge. Pt cooperative; agreeable to therapeutic recommendations and plan of care.      Modified [place]: N/A = No pre-op stroke/TIA     Post-Tx Position: Up in Chair, Alarms activated, and Call light and personal items within reach  PPE: gloves     Therapy Charges for Today         Code Description Service Date Service Provider Modifiers Qty     32606677211 HC PT THERAPEUTIC ACT EA 15 MIN 3/4/2025 Feroz Francois, PTA GP 1     77819510778 HC GAIT TRAINING EA 15 MIN 3/4/2025 Feroz Francois, PTA GP 1     74332260457 HC PT THER PROC EA 15 MIN 3/4/2025 Feroz Francois, PTA GP 1               PT Charges         Row Name 03/04/25 1123                       Time Calculation     Start Time 0959  -UN         Stop Time 1031  -UN         Time Calculation (min) 32 min  -UN         PT Received On 03/04/25  -UN         PT - Next Appointment 03/05/25  -UN                 Time Calculation- PT     Total Timed Code Minutes- PT 32 minute(s)  -UN                      User Key  (r) = Recorded By, (t) = Taken By, (c) = Cosigned By        Initials Name Provider Type     UN Feroz Francois PTA Physical Therapist Assistant                                    Maria M Rossi, PT   Physical " Therapist  Specialty:  Physical Therapy  Therapy Evaluation     Signed  Date of Service:  25 1142  Creation Time:  25 114     Signed        Expand All Collapse All  Patient Name: Juan Marley                      : 1940                      MRN: [id number]                              Today's Date: 2025                                   Admit Date: 2025                        Visit Dx:   Visit Diagnosis       ICD-10-CM ICD-9-CM   1. Acute pain of left knee  M25.562 719.46   2. Pyogenic arthritis of left knee joint, due to unspecified organism  M00.9 711.06   3. S/P revision of total knee, left  Z96.652 V43.65   4. Acute kidney injury  N17.9 584.9   5. Infection of total left knee replacement  T84.54XA 996.66         Problem List       Patient Active Problem List   Diagnosis    S/P revision of total knee, left    Knee pain    Essential tremor    Essential hypertension    Dehydration    NATALIO (acute kidney injury)    Fever    Generalized weakness    Pyogenic arthritis of left knee joint    Confusion    Abnormal liver function test         Medical History        Past Medical History:   Diagnosis Date    Enlarged prostate      Essential tremor       BOTH HANDS    Hypertension      Knee pain, left      Rotator cuff tear, left      Wears dentures           Surgical History         Past Surgical History:   Procedure Laterality Date    CATARACT EXTRACTION, BILATERAL        COLONOSCOPY        ELBOW OLECRANNON BURSA EXCISION Right       X2    ENDOSCOPY        KNEE MINI REVISION Left 2025     Procedure: LEFT  TOTAL KNEE ARTHROPLASTY REVISION POLYETHYLENE EXCHANGE;  Surgeon: Wilmer Bruce MD;  Location: [facility] OR [facility];  Service: Orthopedics;  Laterality: Left;    SPINE SURGERY        SPINAL STENOSIS    SPINE SURGERY   2016     RUPTURED DISC    TONSILLECTOMY AND ADENOIDECTOMY         AGES 22S    TOTAL KNEE ARTHROPLASTY Left 2008    VASECTOMY               General Information         Row  Name 02/26/25 1022                 Physical Therapy Time and Intention     Document Type evaluation  -       Mode of Treatment physical therapy  -          Row Name 02/26/25 1022                 General Information     Patient Profile Reviewed yes  -       Prior Level of Function independent:;all household mobility;community mobility;ADL's;driving;home management  pt is current with outpatient therapy,and was progressing well.  -       Existing Precautions/Restrictions fall  -       Barriers to Rehab none identified  -          Row Name 02/26/25 1022                 Living Environment     People in Home alone  -          Row Name 02/26/25 1022                 Home Main Entrance     Number of Stairs, Main Entrance one  -          Row Name 02/26/25 1022                 Stairs Within Home, Primary     Number of Stairs, Within Home, Primary none  -          Row Name 02/26/25 1022                 Cognition     Orientation Status (Cognition) oriented to;person;situation;place;disoriented to;time  fluctuating confusion since surgery. Seems to tolerate pain medicine poorly, resulting in confusion and poor safety awareness/sequencing.  -[facility] Name 02/26/25 1022                 Safety Issues/Impairments Affecting Functional Mobility     Safety Issues Affecting Function (Mobility) ability to follow commands;at risk behavior observed;friction/shear risk;problem-solving;judgment;positioning of assistive device;safety precaution awareness;sequencing abilities  -       Impairments Affecting Function (Mobility) strength;pain;range of motion (ROM);endurance/activity tolerance;balance;cognition  -                       User Key  (r) = Recorded By, (t) = Taken By, (c) = Cosigned By        Initials Name Provider Type      Maria M Rossi PT Physical Therapist                            Mobility         Row Name 02/26/25 1122                 Bed Mobility     Bed Mobility bed mobility (all) activities   -       All Activities, [place] (Bed Mobility) contact guard;minimum assist (75% patient effort)  -       Comment, (Bed Mobility) supine to sit with compensatory technique RLE ankle supporting LLE and assisting to advance over the EOB. Pt with difficulty pushing up to sitting, requiring support at trunk (Irina) to not fall back down onto R side.  -          Row Name 02/26/25 1122                 Transfers     Comment, (Transfers) max cues for technique. Requires PT assist to progress the walker.  -[facility] Name 02/26/25 1122                 Bed-Chair Transfer     Bed-Chair [place] (Transfers) moderate assist (50% patient effort);2 person assist  -       Assistive Device (Bed-Chair Transfers) walker, front-wheeled  -[facility] Name 02/26/25 1122                 Sit-Stand Transfer     Sit-Stand [place] (Transfers) moderate assist (50% patient effort);2 person assist  -       Assistive Device (Sit-Stand Transfers) walker, front-wheeled  -       Comment, (Sit-Stand Transfer) max cues needed for set up and hand placement.  -[facility] Name 02/26/25 1122                 Gait/Stairs (Locomotion)     Patient was able to Ambulate no, other medical factors prevent ambulation  -       Reason Patient was unable to Ambulate Uncontrolled Pain;Excessive Weakness  unable to WB on LLE  -                       User Key  (r) = Recorded By, (t) = Taken By, (c) = Cosigned By        Initials Name Provider Type      Maria M Rossi, PT Physical Therapist                            Obj/Interventions         [facility] Name 02/26/25 1126                 Range of Motion Comprehensive     Comment, General Range of Motion RLE WFL. LLE  ~75% limited. L knee AROM 0-20-50.  Simultaneous filing. User may be unaware of other data.  -          Row Name 02/26/25 1126                 Strength Comprehensive (MMT)     General Manual Muscle Testing (MMT) Assessment lower extremity strength deficits identified   -       Comment, General Manual Muscle Testing (MMT) Assessment LLE grossly 2-/5, RLE WFL.  Simultaneous filing. User may be unaware of other data.  -[facility] Name 02/26/25 1126                 Motor Skills     Motor Skills functional endurance  -       Functional Endurance FAIR (-)  -[facility] Name 02/26/25 1126                 Balance     Balance Assessment sitting static balance;standing dynamic balance;standing static balance;sitting dynamic balance  -       Static Sitting Balance supervision  -       Dynamic Sitting Balance contact guard  -       Position, Sitting Balance sitting edge of bed;supported  heavy reliance on UE support on bedrail.  -       Static Standing Balance moderate assist;2-person assist  -       Dynamic Standing Balance moderate assist;2-person assist  -       Position/Device Used, Standing Balance walker, front-wheeled  -       Comment, Balance impaired posture, fear, and poor LLE WB tolerance negatively impact standing balance  -[facility] Name 02/26/25 1126                 Sensory Assessment (Somatosensory)     Sensory Assessment (Somatosensory) sensation intact  -                       User Key  (r) = Recorded By, (t) = Taken By, (c) = Cosigned By        Initials Name Provider Type      Maria M Rossi, PT Physical Therapist                            Goals/Plan         [facility] Name 02/26/25 1139                 Bed Mobility Goal 1 (PT)     Activity/Assistive Device (Bed Mobility Goal 1, PT) sit to supine/supine to sit  -       [place] Level/Cues Needed (Bed Mobility Goal 1, PT) modified independence  -       Time Frame (Bed Mobility Goal 1, PT) long term goal (LTG);2 weeks  -[facility] Name 02/26/25 1139                 Transfer Goal 1 (PT)     Activity/Assistive Device (Transfer Goal 1, PT) sit-to-stand/stand-to-sit;bed-to-chair/chair-to-bed;walker, rolling  -       [place] Level/Cues Needed (Transfer Goal 1, PT) modified  independence  -       Time Frame (Transfer Goal 1, PT) long term goal (LTG);2 weeks  -AH          Row Name 02/26/25 1139                 Gait Training Goal 1 (PT)     Activity/Assistive Device (Gait Training Goal 1, PT) gait (walking locomotion);walker, rolling  -       [place] Level (Gait Training Goal 1, PT) modified independence  -       Distance (Gait Training Goal 1, PT) 50'  -       Time Frame (Gait Training Goal 1, PT) long term goal (LTG);2 weeks  -AH          Row Name 02/26/25 1139                 ROM Goal 1 (PT)     ROM Goal 1 (PT) Surgical Knee ROM: 0-90  -       Time Frame (ROM Goal 1, PT) long-term goal (LTG);2 weeks  -AH          Row Name 02/26/25 1139                 Stairs Goal 1 (PT)     Activity/Assistive Device (Stairs Goal 1, PT) stairs, all skills  -       [place] Level/Cues Needed (Stairs Goal 1, PT) modified independence  -       Number of Stairs (Stairs Goal 1, PT) 1  -       Time Frame (Stairs Goal 1, PT) long term goal (LTG);2 weeks  -AH          Row Name 02/26/25 1139                 Therapy Assessment/Plan (PT)     Planned Therapy Interventions (PT) balance training;bed mobility training;gait training;home exercise program;ROM (range of motion);patient/family education;stair training;strengthening;stretching;transfer training  -                    User Key  (r) = Recorded By, (t) = Taken By, (c) = Cosigned By        Initials Name Provider Type      Maria M Rossi, PT Physical Therapist                         Clinical Impression         Row Name 02/26/25 1130                 Pain     Additional Documentation Pain Scale: FACES Pre/Post-Treatment (Group)  -AH          Row Name 02/26/25 1130                 Pain Scale: FACES Pre/Post-Treatment     Pain: FACES Scale, Pretreatment 6-->hurts even more  -       Posttreatment Pain Rating 8-->hurts whole lot  -AH          Row Name 02/26/25 113                 Plan of Care Review     Plan of Care Reviewed With  patient  -       Outcome Evaluation Pt is an 83 y/o male ~4 wks 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 He's now POD 1 left polyethylene insert exchange and washout. WBAT LLE. Pt lives alone in a H with 1 small SUZANNE. He needed assistance initially after his revision but had since progressed to being independent with all mobility, including driving himself to/from outpatient therapy. Pt had a fall on 2/23/25. He does not have 24/7 care/assist available. Per family report pt is usually alert and oriented with no confusion. He presents today anxious and mildly confused. He requires modA x2 for sit to stand transfers, bed to BSC, and BSC to recliner with RW. Pt is unable to tolerate WB on LLE and with significant difficulty advancing the LLE. He is also unable to advance with walker during pivot turns. Max cues are needed for sequencing and max reassurance provided for all OOB activity. Pt is a very high fall risk and is not safe to DC home at alone at this time. PT recommends SNF. PT will follow.  -          Row Name 02/26/25 1130                 Therapy Assessment/Plan (PT)     Patient/Family Therapy Goals Statement (PT) get stronger, less pain, be able to walk.  -       Rehab Potential (PT) good  -       Criteria for Skilled Interventions Met (PT) yes;meets criteria  -       Therapy Frequency (PT) 5 times/wk  -          Row Name 02/26/25 1130                 Positioning and Restraints     Post Treatment Position chair  -       In Chair notified nsg;reclined;call light within reach;encouraged to call for assist;exit alarm on;with other staff  cold packs applied to Lknee. Hemovac intact. Transfer techniques discussed with nurse and nursing assistant. Assist x2 recommended.  -                       User Key  (r)  = Recorded By, (t) = Taken By, (c) = Cosigned By        Initials Name Provider Type      Maria M Rossi, PT Physical Therapist                            Outcome Measures         Row Name 02/26/25 1140 02/26/25 0839            How much help from another person do you currently need...     Turning from your back to your side while in flat bed without using bedrails? 3  -AH 3  -LP     Moving from lying on back to sitting on the side of a flat bed without bedrails? 3  -AH 3  -LP     Moving to and from a bed to a chair (including a wheelchair)? 2  -AH 3  -LP     Standing up from a chair using your arms (e.g., wheelchair, bedside chair)? 2  - 3  -LP     Climbing 3-5 steps with a railing? 1  - 2  -LP     To walk in hospital room? 1  -AH 2  -LP     AM-PAC 6 Clicks Score (PT) 12  - 16  -LP     Highest Level of Mobility Goal 4 --> Transfer to chair/commode  - 5 --> Static standing  -LP        Row Name 02/26/25 1140                 Functional Assessment     Outcome Measure Options AM-PAC 6 Clicks Basic Mobility (PT)  -                       User Key  (r) = Recorded By, (t) = Taken By, (c) = Cosigned By        Initials Name Provider Type     Maria M Cerda PT Physical Therapist     Jolanta Dao LPN Licensed Nurse                          Physical Therapy Education            Title: PT OT SLP Therapies (Done)         Topic: Physical Therapy (Done)         Point: Mobility training (Done)         Learning Progress Summary             Patient Acceptance, E, VU by  at 2/26/2025 1141                            Point: Home exercise program (Done)         Learning Progress Summary             Patient Acceptance, E, VU by  at 2/26/2025 1141                            Point: Body mechanics (Done)         Learning Progress Summary             Patient Acceptance, E, VU by  at 2/26/2025 1141                            Point: Precautions (Done)         Learning Progress Summary             Patient Acceptance,  E, VU by  at 2/26/2025 1141                                                User Key         Initials Effective Dates Name Provider Type Discipline      12/04/23 -  Maria M Rosis, PT Physical Therapist PT                          PT Recommendation and Plan  Planned Therapy Interventions (PT): balance training, bed mobility training, gait training, home exercise program, ROM (range of motion), patient/family education, stair training, strengthening, stretching, transfer training  Outcome Evaluation: Pt is an 85 y/o male ~4 wks 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 He's now POD 1 left polyethylene insert exchange and washout. WBAT LLE. Pt lives alone in a H with 1 small SUZANNE. He needed assistance initially after his revision but had since progressed to being independent with all mobility, including driving himself to/from outpatient therapy. Pt had a fall on 2/23/25. He does not have 24/7 care/assist available. Per family report pt is usually alert and oriented with no confusion. He presents today anxious and mildly confused. He requires modA x2 for sit to stand transfers, bed to BSC, and BSC to recliner with RW. Pt is unable to tolerate WB on LLE and with significant difficulty advancing the LLE. He is also unable to advance with walker during pivot turns. Max cues are needed for sequencing and max reassurance provided for all OOB activity. Pt is a very high fall risk and is not safe to DC home at alone at this time. PT recommends SNF. PT will follow.      Time Calculation:        PT Charges         Row Name 02/26/25 1141                       Time Calculation     Start Time 1006  -         Stop Time 1037  -         Time Calculation (min) 31 min  -         PT Non-Billable Time (min) 0 min  -         PT Received  On 02/26/25  -         PT - Next Appointment 02/27/25  -         PT Goal Re-Cert Due Date 03/12/25  -                 Time Calculation- PT     Total Timed Code Minutes- PT 10 minute(s)  -                      User Key  (r) = Recorded By, (t) = Taken By, (c) = Cosigned By        Initials Name Provider Type      Maria M Rossi, PT Physical Therapist                       Therapy Charges for Today         Code Description Service Date Service Provider Modifiers Qty     46876987415  PT EVAL MOD COMPLEXITY 4 2/26/2025 Maria M Rossi, PT GP 1     09712307907  PT THERAPEUTIC ACT EA 15 MIN 2/26/2025 Maria M Rossi, PT GP 1                PT G-Codes  Outcome Measure Options: AM-PAC 6 Clicks Basic Mobility (PT)  AM-PAC 6 Clicks Score (PT): 12  PT Discharge Summary  Anticipated Discharge Disposition (PT): skilled nursing facility     Maria M Rossi PT               2/26/2025                                    Feroz Francois, PTA   Physical Therapist Assistant  Physical Therapy  Therapy Treatment Note     Signed  Date of Service:  03/04/25 1125  Creation Time:  03/04/25 1125     Signed        Subjective: Pt supine upon arrival. Agreeable to therapeutic plan of care.     Objective:      Bed mobility - Supine to sitting SBA     Transfers - STS SBA using RW     Ambulation - 80 feet CGA using RW. Slight antalgic gait pattern with decreased heel strike.      Therapeutic Exercise - 10 Reps L Lower Extremity AAROM lying supine, supported sitting / chair, and standing     Vitals: WNL     Pain: 5 VAS Location: L knee - medial aspect.   Intervention for pain: RN notified, Increased Activity, and Therapeutic Presence     Education: Provided education on the importance of mobility in the acute care setting, Verbal/Tactile Cues, Transfer Training, and Gait Training     Assessment: Juan Marley presents with functional mobility impairments which indicate the need for skilled intervention. Tolerating session today  "without incident. Pt progressing well with PT POC. Notable improvement in weight bearing tolerance through LLE during upright activities and ambulation. Pt not safe to d/c home alone; thus, continue to recommend SNF at discharge. Will continue to follow and progress as tolerated.      Plan/Recommendations:   If medically appropriate, Moderate Intensity Therapy recommended post-acute care. This is recommended as therapy feels the patient would require 3-4 days per week and wouldn't tolerate \"3 hour daily\" rehab intensity. SNF would be the preferred choice. If the patient does not agree to SNF, arrange HH or OP depending on home bound status. If patient is medically complex, consider LTACH. Pt requires no DME at discharge.      Pt desires Skilled Rehab placement at discharge. Pt cooperative; agreeable to therapeutic recommendations and plan of care.      Modified [place]: N/A = No pre-op stroke/TIA     Post-Tx Position: Up in Chair, Alarms activated, and Call light and personal items within reach  PPE: gloves     Therapy Charges for Today         Code Description Service Date Service Provider Modifiers Qty     28336792445 HC PT THERAPEUTIC ACT EA 15 MIN 3/4/2025 Feroz Francois PTA GP 1     80697025642 HC GAIT TRAINING EA 15 MIN 3/4/2025 Feroz Francois PTA GP 1     05970011729 HC PT THER PROC EA 15 MIN 3/4/2025 Feroz Francois PTA GP 1               PT Charges         Row Name 03/04/25 1123                       Time Calculation     Start Time 0959  -UN         Stop Time 1031  -UN         Time Calculation (min) 32 min  -UN         PT Received On 03/04/25  -UN         PT - Next Appointment 03/05/25  -UN                 Time Calculation- PT     Total Timed Code Minutes- PT 32 minute(s)  -UN                      User Key  (r) = Recorded By, (t) = Taken By, (c) = Cosigned By        Initials Name Provider Type     UN Feroz Francois PTA Physical Therapist Assistant              "

## 2025-03-04 NOTE — THERAPY TREATMENT NOTE
"Subjective: Pt agreeable to therapeutic plan of care.  Cognition: arousal/alertness: Alert and Attentive    Objective:     Bed Mobility: Supervision   Functional Transfers: CGA and with rolling walker     Balance: dynamic, with UE support, and standing CGA and with rolling walker  Functional Ambulation: CGA and with rolling walker    Lower Body Dressing: Mod-A  ADL Position: edge of bed sitting  ADL Comments: Donning socks, unable to reach R foot     Vitals: WNL    Pain: 5 VAS Location: R knee   Interventions for pain: Repositioned  Education: Provided education on the importance of mobility in the acute care setting    Assessment: Juan Marley presents with ADL impairments affecting function including balance, endurance / activity tolerance, pain, range of motion (ROM), and strength. Demonstrated functioning below baseline abilities indicate the need for continued skilled intervention while inpatient. Tolerating session today without incident. Will continue to follow and progress as tolerated.     Plan/Recommendations:   Moderate Intensity Therapy recommended post-acute care. This is recommended as therapy feels the patient would require 3-4 days per week and wouldn't tolerate \"3 hour daily\" rehab intensity. SNF would be the preferred choice. If the patient does not agree to SNF, arrange HH or OP depending on home bound status. If patient is medically complex, consider LTACH.. Pt requires no DME at discharge.     Pt desires Skilled Rehab placement at discharge. Pt cooperative; agreeable to therapeutic recommendations and plan of care.     Modified [place]: N/A = No pre-op stroke/TIA    Post-Tx Position: Supine with HOB Elevated, Alarms activated, and Call light and personal items within reach  PPE: gloves    Therapy Charges for Today       Code Description Service Date Service Provider Modifiers Qty    92249269983  OT THERAPEUTIC ACT EA 15 MIN 3/4/2025 Khadijah De Leon, OT GO 1           Time Calculation- OT "       Row Name 03/04/25 1405             Time Calculation- OT    OT Start Time 1545  -SR      OT Stop Time 1600  -SR      OT Time Calculation (min) 15 min  -SR      Total Timed Code Minutes- OT 15 minute(s)  -SR      OT Received On 03/04/25  -SR      OT - Next Appointment 03/05/25  -SR                User Key  (r) = Recorded By, (t) = Taken By, (c) = Cosigned By      Initials Name Provider Type    Khadijah Marks, OT Occupational Therapist

## 2025-03-04 NOTE — PLAN OF CARE
"Goal Outcome Evaluation:                 Assessment: Juan Marley presents with ADL impairments affecting function including balance, endurance / activity tolerance, pain, range of motion (ROM), and strength. Demonstrated functioning below baseline abilities indicate the need for continued skilled intervention while inpatient. Tolerating session today without incident. Will continue to follow and progress as tolerated.      Plan/Recommendations:   Moderate Intensity Therapy recommended post-acute care. This is recommended as therapy feels the patient would require 3-4 days per week and wouldn't tolerate \"3 hour daily\" rehab intensity. SNF would be the preferred choice. If the patient does not agree to SNF, arrange HH or OP depending on home bound status. If patient is medically complex, consider LTACH.. Pt requires no DME at discharge.                               "

## 2025-03-04 NOTE — CASE MANAGEMENT/SOCIAL WORK
Continued Stay Note  LUCAS Esteban     Patient Name: Juan Marley  MRN: [id number]  Today's Date: 3/4/2025    Admit Date: 2/24/2025    Plan: Accepted to [facility] skilled SNF precert for [place] pending.  PASRR approved.   Discharge Plan       Row Name 03/04/25 1819       Plan    Plan Accepted to [facility] skilled SNF precert for [place] pending.  PASRR approved.    Plan Comments Precert started for [facility] skilled.  PASRR  approved. pod#6 left knee polyethylene insert exchange and washout. , IV antibiotics, ( per ID will need 6 weeks of IV antibiotics)                 Susy Cardenas RN    SIPS 1  [email or web address]  Office [phone]  Cell [phone]

## 2025-03-04 NOTE — PLAN OF CARE
"Assessment: Juan Marley presents with functional mobility impairments which indicate the need for skilled intervention. Tolerating session today without incident. Pt progressing well with PT POC. Notable improvement in weight bearing tolerance through LLE during upright activities and ambulation. Pt not safe to d/c home alone; thus, continue to recommend SNF at discharge. Will continue to follow and progress as tolerated.     Plan/Recommendations:   If medically appropriate, Moderate Intensity Therapy recommended post-acute care. This is recommended as therapy feels the patient would require 3-4 days per week and wouldn't tolerate \"3 hour daily\" rehab intensity. SNF would be the preferred choice. If the patient does not agree to SNF, arrange HH or OP depending on home bound status. If patient is medically complex, consider LTACH. Pt requires no DME at discharge.     Pt desires Skilled Rehab placement at discharge. Pt cooperative; agreeable to therapeutic recommendations and plan of care.     "

## 2025-03-04 NOTE — PROGRESS NOTES
LOS: 8 days   Patient Care Team:  Rolf Harvey MD as PCP - General (Family Medicine)  Cedric Jacobs MD as Consulting Physician (Nephrology)    Subjective     Patient states pain and mobility are improving    Review of Systems   Constitutional:  Positive for activity change and fatigue.   HENT: Negative.     Respiratory: Negative.     Cardiovascular: Negative.    Gastrointestinal: Negative.    Genitourinary: Negative.    Musculoskeletal:  Positive for gait problem. Negative for joint swelling.   Neurological:  Positive for weakness.   Psychiatric/Behavioral: Negative.             Objective     Vital Signs  Temp:  [97.4 °F (36.3 °C)-98.4 °F (36.9 °C)] 97.4 °F (36.3 °C)  Heart Rate:  [61-75] 72  Resp:  [17-19] 19  BP: (123-142)/(58-76) 128/58      Physical Exam  Vitals reviewed.   Constitutional:       Appearance: He is not ill-appearing.   HENT:      Head: Normocephalic and atraumatic.      Right Ear: External ear normal.      Left Ear: External ear normal.      Nose: Nose normal.      Mouth/Throat:      Mouth: Mucous membranes are moist.   Eyes:      General:         Right eye: No discharge.         Left eye: No discharge.   Cardiovascular:      Rate and Rhythm: Normal rate and regular rhythm.      Pulses: Normal pulses.      Heart sounds: Normal heart sounds.   Pulmonary:      Effort: Pulmonary effort is normal.      Breath sounds: Normal breath sounds.   Abdominal:      General: Bowel sounds are normal.      Palpations: Abdomen is soft.   Musculoskeletal:         General: Normal range of motion.      Cervical back: Normal range of motion.   Skin:     General: Skin is warm and dry.   Neurological:      Mental Status: He is alert and oriented to person, place, and time.   Psychiatric:         Behavior: Behavior normal.              Results Review:    Lab Results (last 24 hours)       Procedure Component Value Units Date/Time    Renal Function Panel [[id number]]  (Abnormal) Collected: 03/04/25 9611     Great about the shot! Please get the date and document it in his chart  Also see me about writing the note  Specimen: Blood from Arm, Left Updated: 03/04/25 0512     Glucose 132 mg/dL      BUN 10 mg/dL      Creatinine 0.92 mg/dL      Sodium 136 mmol/L      Potassium 4.1 mmol/L      Chloride 106 mmol/L      CO2 20.5 mmol/L      Calcium 9.1 mg/dL      Albumin 3.0 g/dL      Phosphorus 3.0 mg/dL      Anion Gap 9.5 mmol/L      BUN/Creatinine Ratio 10.9     eGFR 83.1 mL/min/1.73     Narrative:      GFR Categories in Chronic Kidney Disease (CKD)      GFR Category          GFR (mL/min/1.73)    Interpretation  G1                     90 or greater         Normal or high (1)  G2                      60-89                Mild decrease (1)  G3a                   45-59                Mild to moderate decrease  G3b                   30-44                Moderate to severe decrease  G4                    15-29                Severe decrease  G5                    14 or less           Kidney failure          (1)In the absence of evidence of kidney disease, neither GFR category G1 or G2 fulfill the criteria for CKD.    eGFR calculation 2021 CKD-EPI creatinine equation, which does not include race as a factor             Imaging Results (Last 24 Hours)       ** No results found for the last 24 hours. **                 I reviewed the patient's new clinical results.    Medication Review:   Scheduled Meds:aspirin, 81 mg, Oral, Daily  cefTRIAXone, 2,000 mg, Intravenous, Q24H  dicyclomine, 10 mg, Oral, TID  [Held by provider] gabapentin, 100 mg, Oral, Q12H  lisinopril, 10 mg, Oral, Q24H  metoprolol succinate XL, 25 mg, Oral, Q24H  pantoprazole, 40 mg, Oral, Q AM  sodium chloride, 10 mL, Intravenous, Q12H  tamsulosin, 0.4 mg, Oral, Q PM  vitamin D, 50,000 Units, Oral, Q7 Days      Continuous Infusions:   PRN Meds:.  acetaminophen **OR** acetaminophen **OR** acetaminophen    senna-docusate sodium **AND** polyethylene glycol **AND** bisacodyl **AND** bisacodyl    Calcium Replacement - Follow Nurse / BPA Driven Protocol    diphenhydrAMINE-zinc  acetate    hydrALAZINE    HYDROcodone-acetaminophen    HYDROmorphone    hydrOXYzine    Magnesium Standard Dose Replacement - Follow Nurse / BPA Driven Protocol    melatonin    nitroglycerin    ondansetron ODT **OR** ondansetron    Phosphorus Replacement - Follow Nurse / BPA Driven Protocol    Potassium Replacement - Follow Nurse / BPA Driven Protocol    sodium chloride    sodium chloride     Interval History:    Assessment & Plan     Pyogenic arthritis of left knee joint  S/P revision of total knee, left  Knee pain  Essential tremor  Essential hypertension  Dehydration  NATALIO (acute kidney injury)  Fever  Generalized weakness  Confusion   Abnormal liver function test    Plan of care  Left total knee infection. Patient status post aspiration in the ED that showed over 93,000 total nucleated cells and purulence was seen both at the prepatellar bursa and deeper into the knee during an incision and drainage and liner exchange on 2/25/2025     Continue IV ceftriaxone 2 g daily for 6 weeks. Last day will be April 7, 2025.   Cultures grew group B streptococcus    LFT improving liver ultrasound unremarkable  Altered mental status resolved and r/t medication    Plan for disposition:SNF medically ready for dc    GONZALO Pineda  03/04/25  13:50 EST

## 2025-03-04 NOTE — PLAN OF CARE
Goal Outcome Evaluation:   VSS. Aox4. Up w/1 assist w/walker. Pain controlled per MAR. Dressing to L knee clean, dry & intact, pedal pulses palpable. Safety measures in place. Care plan ongoing.

## 2025-03-04 NOTE — PLAN OF CARE
Goal Outcome Evaluation:  Plan of Care Reviewed With: patient        Progress: improving  Outcome Evaluation: Patient up to toliet with assist x 1 with walker, patient without complaints of pain at this time, VSS, continues IV antibiotics, no concerns at this time.

## 2025-03-04 NOTE — PROGRESS NOTES
"NEPHROLOGY PROGRESS NOTE------[facility]    [facility]  [phone]  Cedric Jacobs MD      Patient Care Team:  Rolf Harvey MD as PCP - General (Family Medicine)  Cedric Jacobs MD as Consulting Physician (Nephrology)      Provider:  Cedric Jacobs MD  Patient Name: Juan Marley  :  1940    SUBJECTIVE:    F/U ARF/NATALIO/CRF/CKD   No SOB, CP, dysuria. No edema. Appetite good.     Medication:  aspirin, 81 mg, Oral, Daily  cefTRIAXone, 2,000 mg, Intravenous, Q24H  dicyclomine, 10 mg, Oral, TID  [Held by provider] gabapentin, 100 mg, Oral, Q12H  lisinopril, 10 mg, Oral, Q24H  metoprolol succinate XL, 25 mg, Oral, Q24H  pantoprazole, 40 mg, Oral, Q AM  sodium chloride, 10 mL, Intravenous, Q12H  tamsulosin, 0.4 mg, Oral, Q PM  vitamin D, 50,000 Units, Oral, Q7 Days             OBJECTIVE    Vital Sign Min/Max for last 24 hours  Temp  Min: 98 °F (36.7 °C)  Max: 98.4 °F (36.9 °C)   BP  Min: 123/67  Max: 186/66   Pulse  Min: 61  Max: 75   Resp  Min: 16  Max: 18   SpO2  Min: 95 %  Max: 96 %   No data recorded   No data recorded     Flowsheet Rows      Flowsheet Row First Filed Value   Admission Height 177.8 cm (70\") Documented at 2025   Admission Weight 87 kg (191 lb 12.8 oz) Documented at 2025 0617            I/O this shift:  In: 480 [P.O.:480]  Out: -   I/O last 3 completed shifts:  In: 720 [P.O.:720]  Out: 650 [Urine:650]    Physical Exam:  General Appearance: alert, appears stated age and cooperative  Head: normocephalic, without obvious abnormality and atraumatic  Eyes: conjunctivae and sclerae normal and no icterus  Neck: supple and no JVD  Lungs: clear to auscultation and respirations regular  Heart: regular rhythm & normal rate and normal S1, S2 +NICOLE  Chest: Wall no abnormalities observed  Abdomen: normal bowel sounds and soft, nontender  Extremities: moves extremities well, no edema, no cyanosis and no redness  Skin: no " "bleeding, bruising or rash, turgor normal, color normal and no lesions noted  Neurologic: Alert, and oriented. No focal deficits    Labs:    WBC WBC   Date Value Ref Range Status   03/02/2025 5.80 3.40 - 10.80 10*3/mm3 Final      HGB Hemoglobin   Date Value Ref Range Status   03/02/2025 11.7 (L) 13.0 - 17.7 g/dL Final      HCT Hematocrit   Date Value Ref Range Status   03/02/2025 35.1 (L) 37.5 - 51.0 % Final      Platelets No results found for: \"LABPLAT\"   MCV MCV   Date Value Ref Range Status   03/02/2025 91.9 79.0 - 97.0 fL Final          Sodium Sodium   Date Value Ref Range Status   03/04/2025 136 136 - 145 mmol/L Final   03/03/2025 136 136 - 145 mmol/L Final   03/02/2025 139 136 - 145 mmol/L Final      Potassium Potassium   Date Value Ref Range Status   03/04/2025 4.1 3.5 - 5.2 mmol/L Final   03/03/2025 4.1 3.5 - 5.2 mmol/L Final   03/02/2025 4.0 3.5 - 5.2 mmol/L Final      Chloride Chloride   Date Value Ref Range Status   03/04/2025 106 98 - 107 mmol/L Final   03/03/2025 104 98 - 107 mmol/L Final   03/02/2025 106 98 - 107 mmol/L Final      CO2 CO2   Date Value Ref Range Status   03/04/2025 20.5 (L) 22.0 - 29.0 mmol/L Final   03/03/2025 21.7 (L) 22.0 - 29.0 mmol/L Final   03/02/2025 22.4 22.0 - 29.0 mmol/L Final      BUN BUN   Date Value Ref Range Status   03/04/2025 10 8 - 23 mg/dL Final   03/03/2025 11 8 - 23 mg/dL Final   03/02/2025 14 8 - 23 mg/dL Final      Creatinine Creatinine   Date Value Ref Range Status   03/04/2025 0.92 0.76 - 1.27 mg/dL Final   03/03/2025 0.96 0.76 - 1.27 mg/dL Final   03/02/2025 1.02 0.76 - 1.27 mg/dL Final      Calcium Calcium   Date Value Ref Range Status   03/04/2025 9.1 8.6 - 10.5 mg/dL Final   03/03/2025 8.8 8.6 - 10.5 mg/dL Final   03/02/2025 8.5 (L) 8.6 - 10.5 mg/dL Final      PO4 No components found for: \"PO4\"   Albumin Albumin   Date Value Ref Range Status   03/04/2025 3.0 (L) 3.5 - 5.2 g/dL Final   03/03/2025 3.2 (L) 3.5 - 5.2 g/dL Final   03/02/2025 3.1 (L) 3.5 - 5.2 g/dL " "Final      Magnesium Magnesium   Date Value Ref Range Status   03/02/2025 2.0 1.6 - 2.4 mg/dL Final        Uric Acid No components found for: \"URIC ACID\"     Imaging Results (Last 72 Hours)       ** No results found for the last 72 hours. **            Results for orders placed during the hospital encounter of 02/24/25    XR Knee 1 or 2 View Left    Narrative  XR KNEE 1 OR 2 VW LEFT    Date of Exam: 2/25/2025 9:26 AM EST    Indication: post op    Comparison: Left knee radiograph 2/24/2025    Findings:  Total knee arthroplasty in anatomic alignment. Small less conspicuous joint effusion as well as decreasing soft tissue edema most significant along the anterior aspect of the knee joint. No evidence of loosening, hardware fracture or periprosthetic  fracture. Surgical drain noted.    Impression  Impression:  Decreasing soft tissue swelling and joint fluid. Radiographically uncomplicated total arthroplasty in anatomic alignment.      Electronically Signed: Erick Gilmore MD  2/25/2025 9:36 AM EST  Workstation ID: [id number]      XR Chest 1 View    Narrative  XR CHEST 1 VW    Date of Exam: 2/25/2025 5:25 AM EST    Indication: pre op    Comparison: 9/3/2015    Findings:  Heart size normal. Mild left basilar airspace disease favor atelectasis. The right lung is clear. No pneumothorax. No significant effusion. Osseous structures appear intact.    Impression  Impression:  Mild left basilar airspace disease favor atelectasis.          Electronically Signed: Reynold Marks MD  2/25/2025 7:02 AM EST  Workstation ID: [id number]      XR Knee 4+ View Left    Narrative  XR KNEE 4+ VW LEFT    Date of Exam: 2/24/2025 7:12 AM EST    Indication: pain, redness, swelling s/p TKR revision    Comparison: January 17, 2025, January 29, 2025    Findings:  Status post knee arthroplasty. Hardware components appear in stable and expected positions. No definite acute hardware complication.    There appears to be a small to moderate knee effusion. " There is anterior soft tissue prominence overlying the patella and extensor tendons. There also appears to be diffuse subcutaneous edema. No definite acute osseous abnormality.    Impression  Impression:  1.Status post knee arthroplasty.  No definite acute hardware or osseous complication.  2.Nonspecific small to moderate knee effusion, subcutaneous edema, and anterior soft tissue prominence. Joint or soft tissue infection should be considered based upon patient's reported symptoms. Correlate with clinical findings, lab values, and consider  joint aspiration.          Electronically Signed: Adrien Colette  2/24/2025 7:23 AM EST  Workstation ID: [id number]      Results for orders placed during the hospital encounter of 02/24/25    Duplex Venous Lower Extremity - Left CAR    Interpretation Summary    Normal left lower extremity venous duplex scan.        ASSESSMENT / PLAN      Pyogenic arthritis of left knee joint    S/P revision of total knee, left    Knee pain    Essential tremor    Essential hypertension    Dehydration    NATALIO (acute kidney injury)    Fever    Generalized weakness    Confusion    Abnormal liver function test    Benign hypertension with chronic kidney disease, stage III    ARF/NATALIO/CRF/CKD-------Nonoliguric. ARF/NATALIO resolved. +ARF/NATALIO on top of known CRF/CKD STG 3A. CRF/CKD STG 3A secondary to HTN NS. +ARF/NATALIO secondary to prerenal state and concomitant diuretic/ACE-I use. Off of these.  No NSAIDs or IV dye.     2. HTN WITH CKD-----BP good. Avoid hypotension. No ACE-I/ARB/DRI    3. HYPOCALCEMIA------Replace IV    4. RECENT LEFT KNEE SURGERY WITH INFECTION    5. ANEMIA OF CKD------H/H stable    6. ACIDOSIS----Type 4 RTA. Better with giving po NaHCO3 and follow    7. VITAMIN D DEFICIENCY    8. HYPOPHOSPHATEMIA------replaced    9. HEMATURIA-----One episode. ? UTI. C and S pending.  On Flomax and abx    CR stable/ BP OK  Awaits rehab  Renalwise been stable, will sign off      Cedric Jacobs MD  [facility]  [phone]  03/04/25  10:36 EST

## 2025-03-04 NOTE — THERAPY TREATMENT NOTE
"Subjective: Pt supine upon arrival. Agreeable to therapeutic plan of care.    Objective:     Bed mobility - Supine to sitting SBA    Transfers - STS SBA using RW    Ambulation - 80 feet CGA using RW. Slight antalgic gait pattern with decreased heel strike.     Therapeutic Exercise - 10 Reps L Lower Extremity AAROM lying supine, supported sitting / chair, and standing    Vitals: WNL    Pain: 5 VAS Location: L knee - medial aspect.   Intervention for pain: RN notified, Increased Activity, and Therapeutic Presence    Education: Provided education on the importance of mobility in the acute care setting, Verbal/Tactile Cues, Transfer Training, and Gait Training    Assessment: Juan Marley presents with functional mobility impairments which indicate the need for skilled intervention. Tolerating session today without incident. Pt progressing well with PT POC. Notable improvement in weight bearing tolerance through LLE during upright activities and ambulation. Pt not safe to d/c home alone; thus, continue to recommend SNF at discharge. Will continue to follow and progress as tolerated.     Plan/Recommendations:   If medically appropriate, Moderate Intensity Therapy recommended post-acute care. This is recommended as therapy feels the patient would require 3-4 days per week and wouldn't tolerate \"3 hour daily\" rehab intensity. SNF would be the preferred choice. If the patient does not agree to SNF, arrange HH or OP depending on home bound status. If patient is medically complex, consider LTACH. Pt requires no DME at discharge.     Pt desires Skilled Rehab placement at discharge. Pt cooperative; agreeable to therapeutic recommendations and plan of care.     Modified [place]: N/A = No pre-op stroke/TIA    Post-Tx Position: Up in Chair, Alarms activated, and Call light and personal items within reach  PPE: gloves    Therapy Charges for Today       Code Description Service Date Service Provider Modifiers Qty    29896528402 HC PT " THERAPEUTIC ACT EA 15 MIN 3/4/2025 Feroz Francois, PTA GP 1    14758041734 HC GAIT TRAINING EA 15 MIN 3/4/2025 Feroz Francois, PTA GP 1    70511231616 HC PT THER PROC EA 15 MIN 3/4/2025 Feroz Francois, ELEANOR GP 1           PT Charges       Row Name 03/04/25 1123             Time Calculation    Start Time 0959  -UN      Stop Time 1031  -UN      Time Calculation (min) 32 min  -UN      PT Received On 03/04/25  -UN      PT - Next Appointment 03/05/25  -UN         Time Calculation- PT    Total Timed Code Minutes- PT 32 minute(s)  -UN                User Key  (r) = Recorded By, (t) = Taken By, (c) = Cosigned By      Initials Name Provider Type    UN Feroz Francois PTA Physical Therapist Assistant

## 2025-03-05 VITALS
SYSTOLIC BLOOD PRESSURE: 132 MMHG | RESPIRATION RATE: 13 BRPM | OXYGEN SATURATION: 97 % | HEART RATE: 58 BPM | DIASTOLIC BLOOD PRESSURE: 70 MMHG | BODY MASS INDEX: 27.46 KG/M2 | TEMPERATURE: 97.6 F | WEIGHT: 191.8 LBS | HEIGHT: 70 IN

## 2025-03-05 PROCEDURE — C1751 CATH, INF, PER/CENT/MIDLINE: HCPCS

## 2025-03-05 PROCEDURE — 97535 SELF CARE MNGMENT TRAINING: CPT

## 2025-03-05 PROCEDURE — 97112 NEUROMUSCULAR REEDUCATION: CPT

## 2025-03-05 PROCEDURE — 97530 THERAPEUTIC ACTIVITIES: CPT

## 2025-03-05 PROCEDURE — 97116 GAIT TRAINING THERAPY: CPT

## 2025-03-05 PROCEDURE — 97140 MANUAL THERAPY 1/> REGIONS: CPT

## 2025-03-05 PROCEDURE — 97110 THERAPEUTIC EXERCISES: CPT

## 2025-03-05 PROCEDURE — 25010000002 CEFTRIAXONE PER 250 MG: Performed by: INTERNAL MEDICINE

## 2025-03-05 PROCEDURE — 05HY33Z INSERTION OF INFUSION DEVICE INTO UPPER VEIN, PERCUTANEOUS APPROACH: ICD-10-PCS | Performed by: FAMILY MEDICINE

## 2025-03-05 RX ORDER — ACETAMINOPHEN 325 MG/1
650 TABLET ORAL EVERY 4 HOURS PRN
Start: 2025-03-05

## 2025-03-05 RX ORDER — SODIUM CHLORIDE 0.9 % (FLUSH) 0.9 %
10 SYRINGE (ML) INJECTION AS NEEDED
OUTPATIENT
Start: 2025-03-05

## 2025-03-05 RX ORDER — SODIUM CHLORIDE 0.9 % (FLUSH) 0.9 %
20 SYRINGE (ML) INJECTION AS NEEDED
Status: DISCONTINUED | OUTPATIENT
Start: 2025-03-05 | End: 2025-03-05 | Stop reason: HOSPADM

## 2025-03-05 RX ORDER — SODIUM CHLORIDE 0.9 % (FLUSH) 0.9 %
10 SYRINGE (ML) INJECTION EVERY 12 HOURS SCHEDULED
Status: DISCONTINUED | OUTPATIENT
Start: 2025-03-05 | End: 2025-03-05 | Stop reason: HOSPADM

## 2025-03-05 RX ORDER — HYDROCODONE BITARTRATE AND ACETAMINOPHEN 7.5; 325 MG/1; MG/1
1 TABLET ORAL EVERY 6 HOURS PRN
Qty: 12 TABLET | Refills: 0 | Status: SHIPPED | OUTPATIENT
Start: 2025-03-05

## 2025-03-05 RX ORDER — SODIUM CHLORIDE 0.9 % (FLUSH) 0.9 %
10 SYRINGE (ML) INJECTION EVERY 12 HOURS SCHEDULED
OUTPATIENT
Start: 2025-03-05

## 2025-03-05 RX ORDER — ERGOCALCIFEROL 1.25 MG/1
50000 CAPSULE, LIQUID FILLED ORAL
Start: 2025-03-07

## 2025-03-05 RX ORDER — DIPHENHYDRAMINE HYDROCHLORIDE, ZINC ACETATE 2; .1 G/100G; G/100G
1 CREAM TOPICAL 3 TIMES DAILY PRN
Start: 2025-03-05

## 2025-03-05 RX ORDER — ASPIRIN 81 MG/1
81 TABLET ORAL 2 TIMES DAILY
Qty: 60 TABLET | Refills: 0 | Status: SHIPPED | OUTPATIENT
Start: 2025-03-05 | End: 2025-04-04

## 2025-03-05 RX ORDER — SODIUM CHLORIDE 0.9 % (FLUSH) 0.9 %
10 SYRINGE (ML) INJECTION AS NEEDED
Status: DISCONTINUED | OUTPATIENT
Start: 2025-03-05 | End: 2025-03-05 | Stop reason: HOSPADM

## 2025-03-05 RX ORDER — GABAPENTIN 100 MG/1
100 CAPSULE ORAL EVERY 12 HOURS SCHEDULED
Qty: 14 CAPSULE | Refills: 0 | Status: SHIPPED | OUTPATIENT
Start: 2025-03-05

## 2025-03-05 RX ORDER — LISINOPRIL 10 MG/1
10 TABLET ORAL
Start: 2025-03-06

## 2025-03-05 RX ORDER — SODIUM CHLORIDE 0.9 % (FLUSH) 0.9 %
20 SYRINGE (ML) INJECTION AS NEEDED
OUTPATIENT
Start: 2025-03-05

## 2025-03-05 RX ORDER — HYDROXYZINE HYDROCHLORIDE 25 MG/1
25 TABLET, FILM COATED ORAL 3 TIMES DAILY PRN
Start: 2025-03-05

## 2025-03-05 RX ORDER — SODIUM CHLORIDE 9 MG/ML
40 INJECTION, SOLUTION INTRAVENOUS AS NEEDED
Status: DISCONTINUED | OUTPATIENT
Start: 2025-03-05 | End: 2025-03-05 | Stop reason: HOSPADM

## 2025-03-05 RX ADMIN — DICYCLOMINE HYDROCHLORIDE 10 MG: 10 CAPSULE ORAL at 08:04

## 2025-03-05 RX ADMIN — TAMSULOSIN HYDROCHLORIDE 0.4 MG: 0.4 CAPSULE ORAL at 16:39

## 2025-03-05 RX ADMIN — Medication 10 ML: at 08:04

## 2025-03-05 RX ADMIN — ACETAMINOPHEN 650 MG: 325 TABLET, FILM COATED ORAL at 05:20

## 2025-03-05 RX ADMIN — CEFTRIAXONE 2000 MG: 2 INJECTION, POWDER, FOR SOLUTION INTRAMUSCULAR; INTRAVENOUS at 16:39

## 2025-03-05 RX ADMIN — ASPIRIN 81 MG: 81 TABLET, COATED ORAL at 08:04

## 2025-03-05 RX ADMIN — LISINOPRIL 10 MG: 5 TABLET ORAL at 08:04

## 2025-03-05 RX ADMIN — PANTOPRAZOLE SODIUM 40 MG: 40 TABLET, DELAYED RELEASE ORAL at 05:20

## 2025-03-05 RX ADMIN — DICYCLOMINE HYDROCHLORIDE 10 MG: 10 CAPSULE ORAL at 16:39

## 2025-03-05 NOTE — CONSULTS
Picc team consult;    Procedure explained to patient and informed consent obtained.  Line care discussed with patient.  Time out performed.  Picc line placed RUE basilic vessel utilizing US guidance and modified Seldinger technique with easily compressible vessel without difficulty.

## 2025-03-05 NOTE — PLAN OF CARE
"Goal Outcome Evaluation:      Patient alert and oriented x 4. Able to make needs known. Patient refusing pain medication, states \"He's only trying to take them for physical therapy\". Personal items and call light in reach. Plan of care ongoing.                                       "

## 2025-03-05 NOTE — DISCHARGE SUMMARY
Date of Discharge:  3/5/2025    Discharge Diagnosis:   Same    Presenting Problem/History of Present Illness  Active Hospital Problems    Diagnosis  POA    **Pyogenic arthritis of left knee joint [M00.9]  Yes    Benign hypertension with chronic kidney disease, stage III [I12.9, N18.30]  Yes    Confusion [R41.0]  Yes    Abnormal liver function test [R79.89]  Yes    Knee pain [M25.569]  Yes    Essential tremor [G25.0]  Yes    Essential hypertension [I10]  Yes    Dehydration [E86.0]  Yes    NATALIO (acute kidney injury) [N17.9]  Yes    Fever [R50.9]  Yes    Generalized weakness [R53.1]  Yes    S/P revision of total knee, left [Z96.652]  Not Applicable      Resolved Hospital Problems   No resolved problems to display.          Hospital Course    84 y.o. male who presented to the ER  with PMH of hypertension, enlarged prostate, and hypertension. Patient had left knee replacement at the end of January and presented with knee pain, fever, and increased falls. Information was given by son at bedside. He states that he was doing well post operatively and going to outpatient therapy without an issues. Wednesday he even drove himself to therapy, but during therapy he thought he hurt himself. Son states after that session he had rapid decline laying around, not eating or drinking and continued to take pain medication. States that he had 3 falls. In the ED, he had a fever, creatinine elevated, CRP elevated, lactic normal, doppler of leg negative for clot. He did have joint aspiration per ortho. He was admitted. Pt was given IVF for the dehydration and NATALIO. His beta blocker was held secondary to soft BP.  His ARB/diuretic was held because of NATALIO and dehydration. 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 He underwent Revision left knee single component polyethylene insert with locking bar.  Nephrology and ID also followed pt.  Abx were adjusted. Pt improved.  Drain removed on 2/28.  He will  need rehab. He will need to f/u with Dr. Bruce 3 weeks after surgery. Interoperative cultures with  group B streptococcus.  His abx was changed to ceftriaxone.  He will need this for 6 weeks.  Last day will be 4/7/25.  PICC line was placed prior to d/c.  Pt did have elevated LFTs but these improved and his liver u/s was unremarkable. Renal function returned to baseline. He is stable for d/c to rehab     Procedures Performed    Procedure(s):  KNEE POLYETHYLENE INSERT EXCHANGE AND WASHOUT  -------------------       Consults:   Consults       Date and Time Order Name Status Description    2/25/2025 10:38 AM Inpatient Infectious Diseases Consult Completed     2/25/2025  8:34 AM Inpatient Infectious Diseases Consult Completed     2/24/2025 11:16 AM Inpatient Nephrology Consult Completed     2/24/2025 11:07 AM Family Medicine Consult      2/24/2025  8:52 AM Ortho (on-call MD unless specified) Completed             Pertinent Test Results:    Lab Results (most recent)       Procedure Component Value Units Date/Time    Renal Function Panel [[id number]]  (Abnormal) Collected: 03/04/25 2230    Specimen: Blood from Arm, Left Updated: 03/04/25 2313     Glucose 119 mg/dL      BUN 13 mg/dL      Creatinine 1.03 mg/dL      Sodium 137 mmol/L      Potassium 4.0 mmol/L      Chloride 106 mmol/L      CO2 22.4 mmol/L      Calcium 8.6 mg/dL      Albumin 3.1 g/dL      Phosphorus 2.9 mg/dL      Anion Gap 8.6 mmol/L      BUN/Creatinine Ratio 12.6     eGFR 72.5 mL/min/1.73     Narrative:      GFR Categories in Chronic Kidney Disease (CKD)      GFR Category          GFR (mL/min/1.73)    Interpretation  G1                     90 or greater         Normal or high (1)  G2                      60-89                Mild decrease (1)  G3a                   45-59                Mild to moderate decrease  G3b                   30-44                Moderate to severe decrease  G4                    15-29                Severe decrease  G5                     14 or less           Kidney failure          (1)In the absence of evidence of kidney disease, neither GFR category G1 or G2 fulfill the criteria for CKD.    eGFR calculation 2021 CKD-EPI creatinine equation, which does not include race as a factor    Renal Function Panel [[id number]]  (Abnormal) Collected: 03/04/25 0412    Specimen: Blood from Arm, Left Updated: 03/04/25 0512     Glucose 132 mg/dL      BUN 10 mg/dL      Creatinine 0.92 mg/dL      Sodium 136 mmol/L      Potassium 4.1 mmol/L      Chloride 106 mmol/L      CO2 20.5 mmol/L      Calcium 9.1 mg/dL      Albumin 3.0 g/dL      Phosphorus 3.0 mg/dL      Anion Gap 9.5 mmol/L      BUN/Creatinine Ratio 10.9     eGFR 83.1 mL/min/1.73     Narrative:      GFR Categories in Chronic Kidney Disease (CKD)      GFR Category          GFR (mL/min/1.73)    Interpretation  G1                     90 or greater         Normal or high (1)  G2                      60-89                Mild decrease (1)  G3a                   45-59                Mild to moderate decrease  G3b                   30-44                Moderate to severe decrease  G4                    15-29                Severe decrease  G5                    14 or less           Kidney failure          (1)In the absence of evidence of kidney disease, neither GFR category G1 or G2 fulfill the criteria for CKD.    eGFR calculation 2021 CKD-EPI creatinine equation, which does not include race as a factor    Urine Culture - Urine, Urine, Clean Catch [[id number]]  (Normal) Collected: 03/02/25 0536    Specimen: Urine, Clean Catch Updated: 03/03/25 1255     Urine Culture No growth    Urinalysis With Microscopic If Indicated (No Culture) - Urine, Clean Catch [[id number]]  (Abnormal) Collected: 03/02/25 1133    Specimen: Urine, Clean Catch Updated: 03/02/25 1159     Color, UA Yellow     Appearance, UA Clear     pH, UA 7.5     Specific Gravity, UA 1.018     Glucose, UA Negative     Ketones, UA 15 mg/dL (1+)     Bilirubin,  UA Negative     Blood, UA Negative     Protein, UA Negative     Leuk Esterase, UA Negative     Nitrite, UA Negative     Urobilinogen, UA 0.2 E.U./dL    Narrative:      Urine microscopic not indicated.    Urinalysis, Microscopic Only - Urine, Clean Catch [[id number]]  (Abnormal) Collected: 03/02/25 0536    Specimen: Urine, Clean Catch Updated: 03/02/25 0733     RBC, UA Too Numerous to Count /HPF      WBC, UA 11-20 /HPF      Bacteria, UA None Seen /HPF      Squamous Epithelial Cells, UA 0-2 /HPF      Hyaline Casts, UA None Seen /LPF      Methodology Manual Light Microscopy    Urinalysis With Culture If Indicated - Urine, Clean Catch [[id number]]  (Abnormal) Collected: 03/02/25 0536    Specimen: Urine, Clean Catch Updated: 03/02/25 0713     Color, UA Red     Comment: Any Substance that causes an abnormal urine color can alter the accuracy of the chemical reactions.        Appearance, UA Turbid     pH, UA 7.0     Specific Gravity, UA 1.020     Glucose, UA Negative     Comment: Due to the bloody appearance of the urine, macroscopic testing was performed on a centrifuged urine specimen to reduce RBC interference on the chemical testing.          Ketones, UA Negative     Bilirubin, UA Negative     Blood, UA Large (3+)     Protein, UA 30 mg/dL (1+)     Leuk Esterase, UA --     Comment: The Leukoesterase test cannot be performed due to the centrifugation of the specimen.           Nitrite, UA Negative     Urobilinogen, UA 0.2 E.U./dL    Narrative:      In absence of clinical symptoms, the presence of pyuria, bacteria, and/or nitrites on the urinalysis result does not correlate with infection.    Anaerobic Culture - Surgical Site, Knee, Left [[id number]]  (Normal) Collected: 02/25/25 0737    Specimen: Surgical Site from Knee, Left Updated: 03/02/25 0709     Anaerobic Culture No anaerobes isolated at 5 days    Magnesium [[id number]]  (Normal) Collected: 03/02/25 0121    Specimen: Blood from Arm, Left Updated: 03/02/25 0148      Magnesium 2.0 mg/dL     Protime-INR [[id number]]  (Abnormal) Collected: 03/02/25 0121    Specimen: Blood from Arm, Left Updated: 03/02/25 0141     Protime 15.4 Seconds      INR 1.23    aPTT [[id number]]  (Normal) Collected: 03/02/25 0121    Specimen: Blood from Arm, Left Updated: 03/02/25 0141     PTT 34.0 seconds     CBC (No Diff) [[id number]]  (Abnormal) Collected: 03/02/25 0121    Specimen: Blood from Arm, Left Updated: 03/02/25 0127     WBC 5.80 10*3/mm3      RBC 3.82 10*6/mm3      Hemoglobin 11.7 g/dL      Hematocrit 35.1 %      MCV 91.9 fL      MCH 30.6 pg      MCHC 33.3 g/dL      RDW 12.3 %      RDW-SD 41.4 fl      MPV 9.6 fL      Platelets 342 10*3/mm3     Blood Culture - Blood, Arm, Left [[id number]]  (Normal) Collected: 02/24/25 0820    Specimen: Blood from Arm, Left Updated: 03/01/25 0830     Blood Culture No growth at 5 days    Narrative:      Less than seven (7) mL's of blood was collected.  Insufficient quantity may yield false negative results.    Blood Culture - Blood, Arm, Left [[id number]]  (Normal) Collected: 02/24/25 0723    Specimen: Blood from Arm, Left Updated: 03/01/25 0730     Blood Culture No growth at 5 days    Phosphorus [[id number]]  (Normal) Collected: 02/28/25 1053    Specimen: Blood from Hand, Left Updated: 02/28/25 1126     Phosphorus 2.5 mg/dL     Wound Culture - Surgical Site, Knee, Left [[id number]]  (Abnormal) Collected: 02/25/25 0737    Specimen: Surgical Site from Knee, Left Updated: 02/28/25 1041     Wound Culture Scant growth (1+) Streptococcus agalactiae (Group B)     Comment:   This organism is considered to be universally susceptible to penicillin.  No further antibiotic testing will be performed. If Clindamycin or Erythromycin is the drug of choice, notify the laboratory within 7 days to request susceptibility testing.        Gram Stain Many (4+) WBCs per low power field      No organisms seen    Tissue / Bone Culture - Tissue, Knee, Left [[id number]]  (Abnormal) Collected:  02/25/25 0743    Specimen: Tissue from Knee, Left Updated: 02/28/25 1040     Tissue Culture Rare growth Streptococcus agalactiae (Group B)     Comment:   This organism is considered to be universally susceptible to penicillin.  No further antibiotic testing will be performed. If Clindamycin or Erythromycin is the drug of choice, notify the laboratory within 7 days to request susceptibility testing.        Gram Stain Many (4+) WBCs per low power field      Rare (1+) Gram negative bacilli    Tissue / Bone Culture - Tissue, Knee, Left [[id number]]  (Abnormal) Collected: 02/25/25 0741    Specimen: Tissue from Knee, Left Updated: 02/28/25 1040     Tissue Culture Rare growth Streptococcus agalactiae (Group B)     Comment:   This organism is considered to be universally susceptible to penicillin.  No further antibiotic testing will be performed. If Clindamycin or Erythromycin is the drug of choice, notify the laboratory within 7 days to request susceptibility testing.        Gram Stain Many (4+) WBCs per low power field      Rare (1+) Gram negative bacilli    CBC & Differential [[id number]]  (Abnormal) Collected: 02/27/25 2207    Specimen: Blood Updated: 02/27/25 2221    Narrative:      The following orders were created for panel order CBC & Differential.  Procedure                               Abnormality         Status                     ---------                               -----------         ------                     CBC Auto Differential[[id number]]        Abnormal            Final result                 Please view results for these tests on the individual orders.    CBC Auto Differential [[id number]]  (Abnormal) Collected: 02/27/25 2207    Specimen: Blood Updated: 02/27/25 2221     WBC 6.86 10*3/mm3      RBC 3.60 10*6/mm3      Hemoglobin 10.9 g/dL      Hematocrit 33.1 %      MCV 91.9 fL      MCH 30.3 pg      MCHC 32.9 g/dL      RDW 12.4 %      RDW-SD 41.9 fl      MPV 10.7 fL      Platelets 248 10*3/mm3       Neutrophil % 77.4 %      Lymphocyte % 9.0 %      Monocyte % 7.7 %      Eosinophil % 5.0 %      Basophil % 0.3 %      Immature Grans % 0.6 %      Neutrophils, Absolute 5.31 10*3/mm3      Lymphocytes, Absolute 0.62 10*3/mm3      Monocytes, Absolute 0.53 10*3/mm3      Eosinophils, Absolute 0.34 10*3/mm3      Basophils, Absolute 0.02 10*3/mm3      Immature Grans, Absolute 0.04 10*3/mm3      nRBC 0.0 /100 WBC     Vitamin D,25-Hydroxy [[id number]]  (Abnormal) Collected: 02/26/25 2333    Specimen: Blood from Arm, Left Updated: 02/27/25 1048     25 Hydroxy, Vitamin D 12.9 ng/ml     Narrative:      Reference Range for Total Vitamin D 25(OH)     Deficiency <20.0 ng/mL   Insufficiency 21-29 ng/mL   Sufficiency  ng/mL  Toxicity >100 ng/ml      Body Fluid Culture - Synovial Fluid, Knee, Left [[id number]]  (Abnormal) Collected: 02/24/25 1000    Specimen: Synovial Fluid from Knee, Left Updated: 02/27/25 0746     Body Fluid Culture Light growth (2+) Streptococcus agalactiae (Group B)     Comment:   This organism is considered to be universally susceptible to penicillin.  No further antibiotic testing will be performed.        Gram Stain Many (4+) WBCs per low power field      No organisms seen    CBC & Differential [[id number]]  (Abnormal) Collected: 02/26/25 2333    Specimen: Blood from Arm, Left Updated: 02/26/25 2341    Narrative:      The following orders were created for panel order CBC & Differential.  Procedure                               Abnormality         Status                     ---------                               -----------         ------                     CBC Auto Differential[[id number]]        Abnormal            Final result                 Please view results for these tests on the individual orders.    CBC Auto Differential [[id number]]  (Abnormal) Collected: 02/26/25 2333    Specimen: Blood from Arm, Left Updated: 02/26/25 2341     WBC 8.39 10*3/mm3      RBC 3.56 10*6/mm3      Hemoglobin 10.8 g/dL       Hematocrit 32.6 %      MCV 91.6 fL      MCH 30.3 pg      MCHC 33.1 g/dL      RDW 12.5 %      RDW-SD 42.2 fl      MPV 10.8 fL      Platelets 208 10*3/mm3      Neutrophil % 80.8 %      Lymphocyte % 7.9 %      Monocyte % 8.0 %      Eosinophil % 2.6 %      Basophil % 0.2 %      Immature Grans % 0.5 %      Neutrophils, Absolute 6.78 10*3/mm3      Lymphocytes, Absolute 0.66 10*3/mm3      Monocytes, Absolute 0.67 10*3/mm3      Eosinophils, Absolute 0.22 10*3/mm3      Basophils, Absolute 0.02 10*3/mm3      Immature Grans, Absolute 0.04 10*3/mm3      nRBC 0.0 /100 WBC     Phosphorus [[id number]]  (Abnormal) Collected: 02/26/25 1519    Specimen: Blood from Arm, Left Updated: 02/26/25 1551     Phosphorus 1.7 mg/dL     Glucose, Body Fluid - Synovial Fluid, Knee, Left [[id number]] Collected: 02/24/25 1000    Specimen: Synovial Fluid from Knee, Left Updated: 02/26/25 1310     Glucose, Fluid <2 mg/dL      Comment:              _________________________________________              : BODY FLUID TYPE :        GLUCOSE        :              :_________________:_______________________:              : Amniotic Fluid  :        45 - 76        :              :_________________:_______________________:              : Bile, Clear     :            < 5        :              :_________________:_______________________:              : Bile, Yellow    :            < 8        :              :_________________:_______________________:              : Lymph           :        48 - 200       :              :_________________:_______________________:              : Nasal Secretion :            < 10       :              :_________________:_______________________:              : Pleural Fluid   :        65 -  99       :              :_________________:_______________________:              : Saliva          :            <  2       :              : (Mixed Glands)  :                       :              :_________________:_______________________:               : Sweat           :            <  7       :              :_________________:_______________________:              : Synovial Fluid  :        65 -  99       :              :_________________:_______________________:              : Tears           :        76 - 288       :              :_________________:_______________________:               Floresita W, Renaldo V. Reference Intervals               for Adults and Children 2008. Ninth               edition (V9.1) [employer],               [facility]; [place]: July 2009.  **Verified by repeat analysis**       Narrative:      Performed at:  [address]  [id number]  : Richard Jimenez PhD, Phone:  [phone]    Protein, Body Fluid - Synovial Fluid, Knee, Left [[id number]] Collected: 02/24/25 1000    Specimen: Synovial Fluid from Knee, Left Updated: 02/26/25 1310     Protein, Fluid 4.4 g/dL      Comment:              _________________________________________              : BODY FLUID TYPE :     TOTAL PROTEIN     :              :_________________:_______________________:              : Amniotic Fluid  :               <0.4    :              :_________________:_______________________:              :                 : Nonmalignant: <3.0    :              : Ascitic Fluid   : Malignant:    >3.0    :              :_________________:_______________________:              : Bile, Clear     :               <0.9    :              :_________________:_______________________:              : Bile, Yellow    :          0.2 - 0.6    :              :_________________:_______________________:              : Lymph           :          2.2 - 6.0    :              :_________________:_______________________:              : Human Milk      :          1.9 - 2.0    :              :_________________: ______________________:              : Nasal Secretion :          0.1 - 3.5    :              :_________________:_______________________:               : Pancreatic      :          0.0 - 0.1    :              : Juice           :    (post stimulation) :              :_________________:_______________________:              :                 : Transudate:   <0.3    :              : Pleural Fluid   : Exudate:      >0.3    :              :_________________:_______________________:              : Saliva          :          0.1 - 0.2    :              : (Mixed Glands)  :                       :              :_________________:_______________________:              : Synovial Fluid  :               <2.5    :              :_________________:_______________________:              : Tears           :          0.8 - 0.9    :              :_________________:_______________________:               Renaldo Keen. Reference Intervals               for  Adults and Children 2008. Ninth               Edition (V9.1) [facility] [employer] [employer],               [facility]; [place]: July 2009.       Narrative:      Performed at:  [address] [place] [address]  [id number]  : Richard Jimenez PhD, Phone:  [phone]    Urinalysis With Culture If Indicated - Urine, Clean Catch [[id number]]  (Abnormal) Collected: 02/25/25 2012    Specimen: Urine, Clean Catch Updated: 02/25/25 2022     Color, UA Dark Yellow     Appearance, UA Clear     pH, UA 5.5     Specific Gravity, UA 1.026     Glucose, UA Negative     Ketones, UA Negative     Bilirubin, UA Negative     Blood, UA Negative     Protein, UA 30 mg/dL (1+)     Leuk Esterase, UA Negative     Nitrite, UA Negative     Urobilinogen, UA 1.0 E.U./dL    Narrative:      In absence of clinical symptoms, the presence of pyuria, bacteria, and/or nitrites on the urinalysis result does not correlate with infection.    Urinalysis, Microscopic Only - Urine, Clean Catch [[id number]] Collected: 02/25/25 2012    Specimen: Urine, Clean Catch Updated: 02/25/25 2022     RBC, UA 0-2 /HPF      WBC, UA 0-2 /HPF      Comment: Urine  culture not indicated.        Bacteria, UA None Seen /HPF      Squamous Epithelial Cells, UA 0-2 /HPF      Hyaline Casts, UA 0-2 /LPF      Methodology Automated Microscopy    Ammonia [[id number]]  (Normal) Collected: 02/25/25 1144    Specimen: Blood from Arm, Right Updated: 02/25/25 1217     Ammonia 34 umol/L     Hepatic Function Panel [[id number]]  (Abnormal) Collected: 02/25/25 0012    Specimen: Blood from Arm, Right Updated: 02/25/25 1117     Total Protein 5.6 g/dL      Albumin 3.2 g/dL      ALT (SGPT) 99 U/L      AST (SGOT) 86 U/L      Alkaline Phosphatase 57 U/L      Total Bilirubin 0.8 mg/dL      Bilirubin, Direct 0.4 mg/dL      Bilirubin, Indirect 0.4 mg/dL     Magnesium [[id number]]  (Normal) Collected: 02/25/25 0012    Specimen: Blood from Arm, Right Updated: 02/25/25 0050     Magnesium 2.4 mg/dL     Uric Acid [[id number]]  (Normal) Collected: 02/25/25 0012    Specimen: Blood from Arm, Right Updated: 02/25/25 0050     Uric Acid 6.1 mg/dL     aPTT [[id number]]  (Normal) Collected: 02/25/25 0012    Specimen: Blood from Arm, Right Updated: 02/25/25 0035     PTT 34.3 seconds     Protime-INR [[id number]]  (Abnormal) Collected: 02/25/25 0012    Specimen: Blood from Arm, Right Updated: 02/25/25 0035     Protime 15.5 Seconds      INR 1.23    Urinalysis With Microscopic If Indicated (No Culture) - Urine, Clean Catch [[id number]]  (Abnormal) Collected: 02/24/25 2040    Specimen: Urine, Clean Catch Updated: 02/24/25 2057     Color, UA Orange     Comment: Any Substance that causes an abnormal urine color can alter the accuracy of the chemical reactions.        Appearance, UA Cloudy     pH, UA <=5.0     Specific Gravity, UA 1.028     Glucose, UA Negative     Ketones, UA Trace     Bilirubin, UA Small (1+)     Comment: Confirmation testing is unavailable.  A serum bilirubin is recommended for further assessment.        Blood, UA Negative     Protein, UA 30 mg/dL (1+)     Leuk Esterase, UA Small (1+)     Nitrite, UA Positive      Urobilinogen, UA 2.0 E.U./dL    MRSA Screen, PCR (Inpatient) - Swab, Nares [[id number]]  (Normal) Collected: 02/24/25 1407    Specimen: Swab from Nares Updated: 02/24/25 1615     MRSA PCR No MRSA Detected    Narrative:      The negative predictive value of this diagnostic test is high and should only be used to consider de-escalating anti-MRSA therapy. A positive result may indicate colonization with MRSA and must be correlated clinically.    COVID-19 and FLU A/B PCR, 1 HR TAT - Swab, Nasopharynx [[id number]]  (Normal) Collected: 02/24/25 1128    Specimen: Swab from Nasopharynx Updated: 02/24/25 1154     COVID19 Not Detected     Influenza A PCR Not Detected     Influenza B PCR Not Detected    Narrative:      Fact sheet for providers: https://www.fda.gov/media/736913/download    Fact sheet for patients: https://www.fda.gov/media/045348/download    Test performed by PCR.    Body Fluid Cell Count With Differential - Synovial Fluid, Knee, Left [[id number]]  (Abnormal) Collected: 02/24/25 1000    Specimen: Synovial Fluid from Knee, Left Updated: 02/24/25 1143    Narrative:      The following orders were created for panel order Body Fluid Cell Count With Differential - Synovial Fluid, Knee, Left.  Procedure                               Abnormality         Status                     ---------                               -----------         ------                     Body fluid cell count - ...[[id number]]  Abnormal            Final result               Body fluid differential ...[[id number]]                      Final result                 Please view results for these tests on the individual orders.    Body fluid differential - Synovial Fluid, Knee, Left [[id number]] Collected: 02/24/25 1000    Specimen: Synovial Fluid from Knee, Left Updated: 02/24/25 1143     Neutrophils, Fluid % 95 %      Lymphocytes, Fluid % 2 %      Monocytes, Fluid % 3 %     Narrative:      Concentrated Smear by Cytocentrifuge Prep.    Body fluid cell  count - Synovial Fluid, Knee, Left [[id number]]  (Abnormal) Collected: 02/24/25 1000    Specimen: Synovial Fluid from Knee, Left Updated: 02/24/25 1050     Color, Fluid Orange     Appearance, Fluid Cloudy     Nucleated Cells, Fluid 92,390 /mm3      Method: Automated Sysmex XN Method    Narrative:      No reference range established. Physician to interpret results with clinical findings.    Crystal Exam, Fluid - Synovial Fluid, [[id number]] Collected: 02/24/25 1000    Specimen: Synovial Fluid Updated: 02/24/25 1030     Crystals, Fluid No crystals seen    Comprehensive Metabolic Panel [[id number]]  (Abnormal) Collected: 02/24/25 0723    Specimen: Blood Updated: 02/24/25 0847     Glucose 136 mg/dL      BUN 37 mg/dL      Creatinine 1.96 mg/dL      Sodium 137 mmol/L      Potassium 3.5 mmol/L      Chloride 100 mmol/L      CO2 24.7 mmol/L      Calcium 9.0 mg/dL      Total Protein 6.3 g/dL      Albumin 3.7 g/dL      ALT (SGPT) 131 U/L      AST (SGOT) 138 U/L      Alkaline Phosphatase 69 U/L      Total Bilirubin 1.2 mg/dL      Globulin 2.6 gm/dL      A/G Ratio 1.4 g/dL      BUN/Creatinine Ratio 18.9     Anion Gap 12.3 mmol/L      eGFR 33.5 mL/min/1.73     Narrative:      GFR Categories in Chronic Kidney Disease (CKD)      GFR Category          GFR (mL/min/1.73)    Interpretation  G1                     90 or greater         Normal or high (1)  G2                      60-89                Mild decrease (1)  G3a                   45-59                Mild to moderate decrease  G3b                   30-44                Moderate to severe decrease  G4                    15-29                Severe decrease  G5                    14 or less           Kidney failure          (1)In the absence of evidence of kidney disease, neither GFR category G1 or G2 fulfill the criteria for CKD.    eGFR calculation 2021 CKD-EPI creatinine equation, which does not include race as a factor    C-reactive Protein [[id number]]  (Abnormal) Collected:  02/24/25 0723    Specimen: Blood Updated: 02/24/25 0847     C-Reactive Protein 22.50 mg/dL     Sedimentation Rate [[id number]]  (Normal) Collected: 02/24/25 0723    Specimen: Blood Updated: 02/24/25 0758     Sed Rate 20 mm/hr     POC CHEM 8 [[id number]]  (Abnormal) Collected: 02/24/25 0730    Specimen: Venous Blood Updated: 02/24/25 0733     Glucose 135 mg/dL      BUN 37 mg/dL      Creatinine 2.10 mg/dL      Sodium 136 mmol/L      POC Potassium 3.3 mmol/L      Chloride 99 mmol/L      Total CO2 24 mmol/L      Anion Gap 16.0 mmol/L      Comment: Serial Number: [id number]:  [id number]        Hemoglobin 14.3 g/dL      Hematocrit 42 %      Ionized Calcium 1.12 mmol/L      eGFR 30.8 mL/min/1.73     POC Lactate [[id number]]  (Normal) Collected: 02/24/25 0725    Specimen: Blood Updated: 02/24/25 0726     Lactate 0.9 mmol/L      Comment: Serial Number: [id number]:  [id number]                            Condition on Discharge:  good    Vital Signs  Temp:  [97.6 °F (36.4 °C)-98.6 °F (37 °C)] 97.6 °F (36.4 °C)  Heart Rate:  [58-65] 58  Resp:  [11-21] 13  BP: (128-140)/(58-78) 132/70    Physical Exam:     General Appearance:    Alert, cooperative, in no acute distress   Head:    Normocephalic, without obvious abnormality, atraumatic   Eyes:            Lids and lashes normal, conjunctivae and sclerae normal, no   icterus, no pallor, corneas clear, PERRLA   Ears:    Ears appear intact with no abnormalities noted   Throat:   No oral lesions, no thrush, oral mucosa moist   Neck:   No adenopathy, supple, trachea midline, no thyromegaly, no   carotid bruit, no JVD   Lungs:     Clear to auscultation,respirations regular, even and                  unlabored    Heart:    Regular rhythm and normal rate, normal S1 and S2, no            murmur, no gallop, no rub, no click   Chest Wall:    No abnormalities observed   Abdomen:     Normal bowel sounds, no masses, no organomegaly, soft        non-tender, non-distended, no guarding, no  rebound                tenderness   Extremities:   Moves all extremities well, no edema, no cyanosis, no             redness   Pulses:   Pulses palpable and equal bilaterally   Skin:   No bleeding, bruising or rash   Lymph nodes:   No palpable adenopathy   Neurologic:   Cranial nerves 2 - 12 grossly intact, sensation intact, DTR       present and equal bilaterally       Discharge Disposition  Rehab Facility or Unit (DC - External)    Discharge Medications     Discharge Medications        New Medications        Instructions Start Date   acetaminophen 325 MG tablet  Commonly known as: TYLENOL   650 mg, Oral, Every 4 Hours PRN      cefTRIAXone 2,000 mg in sodium chloride 0.9 % 100 mL IVPB   2,000 mg, Intravenous, Every 24 Hours      diphenhydrAMINE-zinc acetate 2-0.1 % cream   1 Application, Topical, 3 Times Daily PRN      hydrOXYzine 25 MG tablet  Commonly known as: ATARAX   25 mg, Oral, 3 Times Daily PRN      lisinopril 10 MG tablet  Commonly known as: PRINIVIL,ZESTRIL   10 mg, Oral, Every 24 Hours Scheduled   Start Date: March 6, 2025     vitamin D 1.25 MG (07378 UT) capsule capsule  Commonly known as: ERGOCALCIFEROL   50,000 Units, Oral, Every 7 Days   Start Date: March 7, 2025            Changes to Medications        Instructions Start Date   gabapentin 100 MG capsule  Commonly known as: NEURONTIN  What changed:   medication strength  how much to take  when to take this  Another medication with the same name was removed. Continue taking this medication, and follow the directions you see here.   100 mg, Oral, Every 12 Hours Scheduled             Continue These Medications        Instructions Start Date   aspirin 81 MG EC tablet   81 mg, Oral, 2 Times Daily      dicyclomine 20 MG tablet  Commonly known as: BENTYL   20 mg, Oral, 4 Times Daily, TAKES ONE IN THE MORNING, ONE IN THE EVENING, AND 2 AT BEDTIME      HYDROcodone-acetaminophen 7.5-325 MG per tablet  Commonly known as: Norco   1 tablet, Oral, Every 6 Hours  PRN      metoprolol succinate XL 25 MG 24 hr tablet  Commonly known as: TOPROL-XL   Take 1 tablet by mouth Every Night.      tamsulosin 0.4 MG capsule 24 hr capsule  Commonly known as: FLOMAX   1 capsule, Every Evening             Stop These Medications      COLLAGEN PO     fish oil 1000 MG capsule capsule     lisinopril-hydrochlorothiazide 10-12.5 MG per tablet  Commonly known as: PRINZIDE,ZESTORETIC     meloxicam 15 MG tablet  Commonly known as: MOBIC     NERVIVE NERVE RELIEF PO     primidone 50 MG tablet  Commonly known as: MYSOLINE     tadalafil 20 MG tablet  Commonly known as: CIALIS     Vitamin-B Complex tablet              Discharge Diet:     Activity at Discharge: per rehab    Follow-up Appointments  No future appointments.  Additional Instructions for the Follow-ups that You Need to Schedule       Discharge Follow-up with PCP   As directed       Currently Documented PCP:    Rolf Harvey MD    PCP Phone Number:    [phone]     Follow Up Details: after rehab        Discharge Follow-up with Specialty: fidencio Bruce   As directed      Specialty: fidencio Bruce   Follow Up Details: 3 weeks after surgery date                Test Results Pending at Discharge       Erum Casey MD  03/05/25  14:15 EST

## 2025-03-05 NOTE — SIGNIFICANT NOTE
Case Management/Social Work    Patient Name:  Juan Marley  YOB: 1940  MRN: [id number]  Admit Date:  2/24/2025 03/05/25 1303   Post Acute Pre-Cert Documentation   Date Post Acute Pre-Cert Completed 03/05/25   All Clinicals Submitted? Yes   Response Received from Insurance? Approval   Post Acute Pre-Cert Initiated Comment ESTIVEN verified SNF precert approval via [facility] portal. Plan Auth ID [id number], portal auth ID [id number]. Valid 3/5-3/7. CM made aware.           Electronically signed by:  Rodrigue Jolly CMA  03/05/25 13:05 EST    Rodrigue Jolly  Case Management Associate  [address] [address]  P: [phone]  F: [phone]

## 2025-03-05 NOTE — CASE MANAGEMENT/SOCIAL WORK
Continued Stay Note   Carlito     Patient Name: Juan Marley  MRN: [id number]  Today's Date: 3/5/2025    Admit Date: 2/24/2025    Plan: DC plan: [facility] skilled.  Precert approved. Valid 3.5.25-3.7.25.  PASRR  approved.  Bed ready today 3/5/25 late afternoon.  Pharmacy updated.   Discharge Plan       Row Name 03/05/25 1408       Plan    Plan DC plan: [facility] skilled.  Precert approved. Valid 3.5.25-3.7.25.  PASRR  approved.  Bed ready today 3/5/25 late afternoon.  Pharmacy updated.    Patient/Family in Agreement with Plan yes    Plan Comments Precert approve for [facility].  Bed ready late afternoon.  Pharmacy updated for facility.                      Susy Cardenas RN    SIPS 1  [email or web address]  Office [phone]  Cell [phone]

## 2025-03-05 NOTE — PLAN OF CARE
"Assessment: Juan Marley presents with functional mobility impairments which indicate the need for skilled intervention. Pt tolerates session well today, making ROM progressions with exercises and stretches. Pt able to achieve 15-90 degrees ROM in L knee. Pt highly motivated in therapy and eager to d/c to rehab. Will continue to follow and progress as tolerated.      Plan/Recommendations:   If medically appropriate, Moderate Intensity Therapy recommended post-acute care. This is recommended as therapy feels the patient would require 3-4 days per week and wouldn't tolerate \"3 hour daily\" rehab intensity. SNF would be the preferred choice. If the patient does not agree to SNF, arrange HH or OP depending on home bound status. If patient is medically complex, consider LTACH. Pt requires no DME at discharge.      Pt desires Skilled Rehab placement at discharge. Pt cooperative; agreeable to therapeutic recommendations and plan of care.                                "

## 2025-03-05 NOTE — PLAN OF CARE
Goal Outcome Evaluation:                                  Pt A&O x4. Pt able to make needs known. Pain treated per MAR. PICC line placed by IV team. Pt discharging to rehab today.

## 2025-03-05 NOTE — THERAPY TREATMENT NOTE
"Subjective: Pt agreeable to therapeutic plan of care.    Objective:     Precautions - L LE WBAT    Bed mobility - Modified-Independent  Transfers - CGA  Ambulation - 2x100 feet CGA    Therapeutic Exercise - 20 Reps L Lower Extremity AROM lying supine, supported sitting / chair, and standing    Vitals: WNL    Pain: 5 VAS Location: L LE  Intervention for pain: Repositioned, Increased Activity, and Therapeutic Presence    Education: Provided education on the importance of mobility in the acute care setting, Transfer Training, and Gait Training    Assessment: Juan Marley presents with functional mobility impairments which indicate the need for skilled intervention. Pt tolerates session well today, making ROM progressions with exercises and stretches. Pt able to achieve 15-90 degrees ROM in L knee. Pt highly motivated in therapy and eager to d/c to rehab. Will continue to follow and progress as tolerated.     Plan/Recommendations:   If medically appropriate, Moderate Intensity Therapy recommended post-acute care. This is recommended as therapy feels the patient would require 3-4 days per week and wouldn't tolerate \"3 hour daily\" rehab intensity. SNF would be the preferred choice. If the patient does not agree to SNF, arrange HH or OP depending on home bound status. If patient is medically complex, consider LTACH. Pt requires no DME at discharge.     Pt desires Skilled Rehab placement at discharge. Pt cooperative; agreeable to therapeutic recommendations and plan of care.     Modified Lutz: N/A = No pre-op stroke/TIA    Post-Tx Position: Supine with HOB Elevated, Alarms activated, and Call light and personal items within reach  PPE: gloves    Therapy Charges for Today       Code Description Service Date Service Provider Modifiers Qty    19937230693  PT THERAPEUTIC ACT EA 15 MIN 3/5/2025 Jazzy Arboleda, PTA Student GP 1    [id number]  PT THER PROC EA 15 MIN 3/5/2025 Jazzy Arboleda, ELEANOR Student GP 1    [id number]  PT " NEUROMUSC RE EDUCATION EA 15 MIN 3/5/2025 RoblesJazzy burger, PTA Student GP 1    [id number] HC GAIT TRAINING EA 15 MIN 3/5/2025 Robles Jazzy, PTA Student GP 1    [id number] HC PT MANUAL THERAPY EA 15 MIN 3/5/2025 RoblesJazzy burger, PTA Student GP 1    [id number] HC PT SELF CARE/MGMT/TRAIN EA 15 MIN 3/5/2025 RoblesJazzy burger, PTA Student GP 1    56111765324 HC PT THER PROC EA 15 MIN 3/5/2025 RoblesJazzy burger, PTA Student GP 1    [id number] HC GAIT TRAINING EA 15 MIN 3/5/2025 Robles Jazzy, PTA Student GP 1    84618824585 HC PT THERAPEUTIC ACT EA 15 MIN 3/5/2025 Jazzy Arboleda, PTA Student GP 1           PT Charges       Row Name 03/05/25 1159             Time Calculation    Start Time 1038 (P)   -ZK      Stop Time 1106 (P)   -ZK      Time Calculation (min) 28 min (P)   -ZK      PT Received On 03/05/25 (P)   -ZK      PT - Next Appointment 03/06/25 (P)   -ZK         Time Calculation- PT    Total Timed Code Minutes- PT 28 minute(s) (P)   -ZK                User Key  (r) = Recorded By, (t) = Taken By, (c) = Cosigned By      Initials Name Provider Type    Jazzy Lindsay PTA Student PTA Soo

## 2025-03-06 NOTE — CASE MANAGEMENT/SOCIAL WORK
Case Management Discharge Note      [facility]    Provided Post Acute Provider List?: N/A  Provided Post Acute Provider Quality & Resource List?: N/A    Selected Continued Care - Discharged on 3/5/2025 Admission date: 2/24/2025 - Discharge disposition: Rehab Facility or Unit (DC - External)      Destination Coordination complete.      Service Provider Services Address Phone Fax Patient Preferred    [facility]  [address] [place] [address] [phone] --                    Transportation Services  W/C Van: Rubio Parker    Final Discharge Disposition Code: 03 - skilled nursing facility (SNF)

## 2025-03-25 PROBLEM — M86.9 BONE INFECTION: Status: ACTIVE | Noted: 2025-03-25

## 2025-03-26 ENCOUNTER — HOSPITAL ENCOUNTER (OUTPATIENT)
Dept: INFUSION THERAPY | Facility: HOSPITAL | Age: 85
Discharge: HOME | End: 2025-03-26
Payer: MEDICARE

## 2025-03-26 DIAGNOSIS — M86.9 BONE INFECTION: Primary | ICD-10-CM

## 2025-03-26 PROCEDURE — 25010000002 CEFTRIAXONE PER 250 MG: Performed by: INTERNAL MEDICINE

## 2025-03-26 PROCEDURE — 96365 THER/PROPH/DIAG IV INF INIT: CPT

## 2025-03-26 RX ADMIN — CEFTRIAXONE 2000 MG: 2 INJECTION, POWDER, FOR SOLUTION INTRAMUSCULAR; INTRAVENOUS at 09:16

## 2025-03-27 ENCOUNTER — HOSPITAL ENCOUNTER (OUTPATIENT)
Dept: INFUSION THERAPY | Facility: HOSPITAL | Age: 85
Discharge: HOME | End: 2025-03-27
Payer: MEDICARE

## 2025-03-27 VITALS
OXYGEN SATURATION: 97 % | TEMPERATURE: 98.1 F | SYSTOLIC BLOOD PRESSURE: 126 MMHG | HEART RATE: 70 BPM | DIASTOLIC BLOOD PRESSURE: 59 MMHG | RESPIRATION RATE: 16 BRPM

## 2025-03-27 DIAGNOSIS — M86.9 BONE INFECTION: Primary | ICD-10-CM

## 2025-03-27 LAB
BASOPHILS # BLD AUTO: 0.03 10*3/MM3 (ref 0–0.2)
BASOPHILS NFR BLD AUTO: 0.5 % (ref 0–1.5)
CREAT SERPL-MCNC: 1.03 MG/DL (ref 0.76–1.27)
DEPRECATED RDW RBC AUTO: 41.3 FL (ref 37–54)
EGFRCR SERPLBLD CKD-EPI 2021: 72.5 ML/MIN/1.73
EOSINOPHIL # BLD AUTO: 0.26 10*3/MM3 (ref 0–0.4)
EOSINOPHIL NFR BLD AUTO: 4 % (ref 0.3–6.2)
ERYTHROCYTE [DISTWIDTH] IN BLOOD BY AUTOMATED COUNT: 12.4 % (ref 12.3–15.4)
ERYTHROCYTE [SEDIMENTATION RATE] IN BLOOD: 16 MM/HR (ref 0–20)
HCT VFR BLD AUTO: 38.4 % (ref 37.5–51)
HGB BLD-MCNC: 12.4 G/DL (ref 13–17.7)
IMM GRANULOCYTES # BLD AUTO: 0.05 10*3/MM3 (ref 0–0.05)
IMM GRANULOCYTES NFR BLD AUTO: 0.8 % (ref 0–0.5)
LYMPHOCYTES # BLD AUTO: 1.02 10*3/MM3 (ref 0.7–3.1)
LYMPHOCYTES NFR BLD AUTO: 15.5 % (ref 19.6–45.3)
MCH RBC QN AUTO: 29.5 PG (ref 26.6–33)
MCHC RBC AUTO-ENTMCNC: 32.3 G/DL (ref 31.5–35.7)
MCV RBC AUTO: 91.2 FL (ref 79–97)
MONOCYTES # BLD AUTO: 0.69 10*3/MM3 (ref 0.1–0.9)
MONOCYTES NFR BLD AUTO: 10.5 % (ref 5–12)
NEUTROPHILS NFR BLD AUTO: 4.51 10*3/MM3 (ref 1.7–7)
NEUTROPHILS NFR BLD AUTO: 68.7 % (ref 42.7–76)
NRBC BLD AUTO-RTO: 0 /100 WBC (ref 0–0.2)
PLATELET # BLD AUTO: 275 10*3/MM3 (ref 140–450)
PMV BLD AUTO: 10 FL (ref 6–12)
RBC # BLD AUTO: 4.21 10*6/MM3 (ref 4.14–5.8)
WBC NRBC COR # BLD AUTO: 6.56 10*3/MM3 (ref 3.4–10.8)

## 2025-03-27 PROCEDURE — 85025 COMPLETE CBC W/AUTO DIFF WBC: CPT | Performed by: NURSE PRACTITIONER

## 2025-03-27 PROCEDURE — 85652 RBC SED RATE AUTOMATED: CPT | Performed by: NURSE PRACTITIONER

## 2025-03-27 PROCEDURE — 82565 ASSAY OF CREATININE: CPT | Performed by: NURSE PRACTITIONER

## 2025-03-27 PROCEDURE — 36415 COLL VENOUS BLD VENIPUNCTURE: CPT

## 2025-03-27 PROCEDURE — 25010000002 CEFTRIAXONE PER 250 MG: Performed by: INTERNAL MEDICINE

## 2025-03-27 PROCEDURE — 96365 THER/PROPH/DIAG IV INF INIT: CPT

## 2025-03-27 RX ADMIN — CEFTRIAXONE 2000 MG: 2 INJECTION, POWDER, FOR SOLUTION INTRAMUSCULAR; INTRAVENOUS at 09:09

## 2025-03-28 ENCOUNTER — HOSPITAL ENCOUNTER (OUTPATIENT)
Dept: INFUSION THERAPY | Facility: HOSPITAL | Age: 85
Discharge: HOME | End: 2025-03-28
Payer: MEDICARE

## 2025-03-28 VITALS
OXYGEN SATURATION: 97 % | HEART RATE: 78 BPM | TEMPERATURE: 98.8 F | RESPIRATION RATE: 18 BRPM | DIASTOLIC BLOOD PRESSURE: 67 MMHG | SYSTOLIC BLOOD PRESSURE: 120 MMHG

## 2025-03-28 DIAGNOSIS — M86.9 BONE INFECTION: Primary | ICD-10-CM

## 2025-03-28 PROCEDURE — 25010000002 CEFTRIAXONE PER 250 MG: Performed by: INTERNAL MEDICINE

## 2025-03-28 PROCEDURE — 96365 THER/PROPH/DIAG IV INF INIT: CPT

## 2025-03-28 RX ADMIN — CEFTRIAXONE 2000 MG: 2 INJECTION, POWDER, FOR SOLUTION INTRAMUSCULAR; INTRAVENOUS at 09:04

## 2025-03-29 ENCOUNTER — HOSPITAL ENCOUNTER (OUTPATIENT)
Dept: INFUSION THERAPY | Facility: HOSPITAL | Age: 85
Discharge: HOME | End: 2025-03-29
Payer: MEDICARE

## 2025-03-29 VITALS
RESPIRATION RATE: 16 BRPM | SYSTOLIC BLOOD PRESSURE: 124 MMHG | OXYGEN SATURATION: 95 % | DIASTOLIC BLOOD PRESSURE: 59 MMHG | HEART RATE: 74 BPM | TEMPERATURE: 98 F

## 2025-03-29 DIAGNOSIS — M86.9 BONE INFECTION: Primary | ICD-10-CM

## 2025-03-29 PROCEDURE — 96365 THER/PROPH/DIAG IV INF INIT: CPT

## 2025-03-29 PROCEDURE — 25010000002 CEFTRIAXONE PER 250 MG: Performed by: INTERNAL MEDICINE

## 2025-03-29 RX ADMIN — CEFTRIAXONE 2000 MG: 2 INJECTION, POWDER, FOR SOLUTION INTRAMUSCULAR; INTRAVENOUS at 09:23

## 2025-03-30 ENCOUNTER — HOSPITAL ENCOUNTER (OUTPATIENT)
Dept: INFUSION THERAPY | Facility: HOSPITAL | Age: 85
Discharge: HOME | End: 2025-03-30
Admitting: INTERNAL MEDICINE
Payer: MEDICARE

## 2025-03-30 VITALS
RESPIRATION RATE: 16 BRPM | HEART RATE: 73 BPM | DIASTOLIC BLOOD PRESSURE: 59 MMHG | TEMPERATURE: 97.9 F | SYSTOLIC BLOOD PRESSURE: 103 MMHG | OXYGEN SATURATION: 96 %

## 2025-03-30 DIAGNOSIS — M86.9 BONE INFECTION: Primary | ICD-10-CM

## 2025-03-30 PROCEDURE — 96365 THER/PROPH/DIAG IV INF INIT: CPT

## 2025-03-30 PROCEDURE — G0463 HOSPITAL OUTPT CLINIC VISIT: HCPCS

## 2025-03-30 PROCEDURE — 25010000002 CEFTRIAXONE PER 250 MG: Performed by: INTERNAL MEDICINE

## 2025-03-30 RX ADMIN — CEFTRIAXONE 2000 MG: 2 INJECTION, POWDER, FOR SOLUTION INTRAMUSCULAR; INTRAVENOUS at 09:15

## 2025-03-31 ENCOUNTER — HOSPITAL ENCOUNTER (OUTPATIENT)
Dept: INFUSION THERAPY | Facility: HOSPITAL | Age: 85
Discharge: HOME | End: 2025-03-31
Admitting: INTERNAL MEDICINE
Payer: MEDICARE

## 2025-03-31 VITALS
TEMPERATURE: 97.6 F | RESPIRATION RATE: 20 BRPM | DIASTOLIC BLOOD PRESSURE: 84 MMHG | OXYGEN SATURATION: 97 % | SYSTOLIC BLOOD PRESSURE: 117 MMHG | HEART RATE: 75 BPM

## 2025-03-31 DIAGNOSIS — M86.9 BONE INFECTION: Primary | ICD-10-CM

## 2025-03-31 PROCEDURE — 96365 THER/PROPH/DIAG IV INF INIT: CPT

## 2025-03-31 PROCEDURE — 25010000002 CEFTRIAXONE PER 250 MG: Performed by: INTERNAL MEDICINE

## 2025-03-31 RX ADMIN — CEFTRIAXONE 2000 MG: 2 INJECTION, POWDER, FOR SOLUTION INTRAMUSCULAR; INTRAVENOUS at 09:04

## 2025-04-01 ENCOUNTER — HOSPITAL ENCOUNTER (OUTPATIENT)
Dept: INFUSION THERAPY | Facility: HOSPITAL | Age: 85
Discharge: HOME | End: 2025-04-01
Admitting: INTERNAL MEDICINE
Payer: MEDICARE

## 2025-04-01 VITALS
DIASTOLIC BLOOD PRESSURE: 67 MMHG | SYSTOLIC BLOOD PRESSURE: 111 MMHG | HEART RATE: 74 BPM | OXYGEN SATURATION: 96 % | RESPIRATION RATE: 18 BRPM | TEMPERATURE: 98.4 F

## 2025-04-01 DIAGNOSIS — M86.9 BONE INFECTION: Primary | ICD-10-CM

## 2025-04-01 PROCEDURE — 96365 THER/PROPH/DIAG IV INF INIT: CPT

## 2025-04-01 PROCEDURE — 25010000002 CEFTRIAXONE PER 250 MG: Performed by: INTERNAL MEDICINE

## 2025-04-01 RX ADMIN — CEFTRIAXONE 2000 MG: 2 INJECTION, POWDER, FOR SOLUTION INTRAMUSCULAR; INTRAVENOUS at 09:12

## 2025-04-02 ENCOUNTER — HOSPITAL ENCOUNTER (OUTPATIENT)
Dept: INFUSION THERAPY | Facility: HOSPITAL | Age: 85
Discharge: HOME | End: 2025-04-02
Admitting: INTERNAL MEDICINE
Payer: MEDICARE

## 2025-04-02 VITALS
OXYGEN SATURATION: 97 % | SYSTOLIC BLOOD PRESSURE: 103 MMHG | RESPIRATION RATE: 16 BRPM | TEMPERATURE: 97.9 F | DIASTOLIC BLOOD PRESSURE: 58 MMHG | HEART RATE: 66 BPM

## 2025-04-02 DIAGNOSIS — M86.9 BONE INFECTION: Primary | ICD-10-CM

## 2025-04-02 PROCEDURE — 96365 THER/PROPH/DIAG IV INF INIT: CPT

## 2025-04-02 PROCEDURE — 25010000002 CEFTRIAXONE PER 250 MG: Performed by: INTERNAL MEDICINE

## 2025-04-02 RX ADMIN — CEFTRIAXONE 2000 MG: 2 INJECTION, POWDER, FOR SOLUTION INTRAMUSCULAR; INTRAVENOUS at 09:08

## 2025-04-03 ENCOUNTER — HOSPITAL ENCOUNTER (OUTPATIENT)
Dept: INFUSION THERAPY | Facility: HOSPITAL | Age: 85
Discharge: HOME | End: 2025-04-03
Admitting: INTERNAL MEDICINE
Payer: MEDICARE

## 2025-04-03 VITALS
DIASTOLIC BLOOD PRESSURE: 69 MMHG | HEART RATE: 63 BPM | TEMPERATURE: 98 F | RESPIRATION RATE: 16 BRPM | SYSTOLIC BLOOD PRESSURE: 126 MMHG | OXYGEN SATURATION: 97 %

## 2025-04-03 DIAGNOSIS — M86.9 BONE INFECTION: Primary | ICD-10-CM

## 2025-04-03 LAB
BASOPHILS # BLD AUTO: 0.03 10*3/MM3 (ref 0–0.2)
BASOPHILS NFR BLD AUTO: 0.5 % (ref 0–1.5)
CREAT SERPL-MCNC: 0.9 MG/DL (ref 0.76–1.27)
DEPRECATED RDW RBC AUTO: 41.5 FL (ref 37–54)
EGFRCR SERPLBLD CKD-EPI 2021: 85.3 ML/MIN/1.73
EOSINOPHIL # BLD AUTO: 0.32 10*3/MM3 (ref 0–0.4)
EOSINOPHIL NFR BLD AUTO: 5.8 % (ref 0.3–6.2)
ERYTHROCYTE [DISTWIDTH] IN BLOOD BY AUTOMATED COUNT: 12.5 % (ref 12.3–15.4)
ERYTHROCYTE [SEDIMENTATION RATE] IN BLOOD: 13 MM/HR (ref 0–20)
HCT VFR BLD AUTO: 36.5 % (ref 37.5–51)
HGB BLD-MCNC: 11.8 G/DL (ref 13–17.7)
IMM GRANULOCYTES # BLD AUTO: 0.04 10*3/MM3 (ref 0–0.05)
IMM GRANULOCYTES NFR BLD AUTO: 0.7 % (ref 0–0.5)
LYMPHOCYTES # BLD AUTO: 1.01 10*3/MM3 (ref 0.7–3.1)
LYMPHOCYTES NFR BLD AUTO: 18.3 % (ref 19.6–45.3)
MCH RBC QN AUTO: 29.3 PG (ref 26.6–33)
MCHC RBC AUTO-ENTMCNC: 32.3 G/DL (ref 31.5–35.7)
MCV RBC AUTO: 90.6 FL (ref 79–97)
MONOCYTES # BLD AUTO: 0.68 10*3/MM3 (ref 0.1–0.9)
MONOCYTES NFR BLD AUTO: 12.3 % (ref 5–12)
NEUTROPHILS NFR BLD AUTO: 3.44 10*3/MM3 (ref 1.7–7)
NEUTROPHILS NFR BLD AUTO: 62.4 % (ref 42.7–76)
NRBC BLD AUTO-RTO: 0 /100 WBC (ref 0–0.2)
PLATELET # BLD AUTO: 275 10*3/MM3 (ref 140–450)
PMV BLD AUTO: 10.2 FL (ref 6–12)
RBC # BLD AUTO: 4.03 10*6/MM3 (ref 4.14–5.8)
WBC NRBC COR # BLD AUTO: 5.52 10*3/MM3 (ref 3.4–10.8)

## 2025-04-03 PROCEDURE — 25010000002 CEFTRIAXONE PER 250 MG: Performed by: INTERNAL MEDICINE

## 2025-04-03 PROCEDURE — 85652 RBC SED RATE AUTOMATED: CPT | Performed by: NURSE PRACTITIONER

## 2025-04-03 PROCEDURE — 96365 THER/PROPH/DIAG IV INF INIT: CPT

## 2025-04-03 PROCEDURE — 96366 THER/PROPH/DIAG IV INF ADDON: CPT

## 2025-04-03 PROCEDURE — 36592 COLLECT BLOOD FROM PICC: CPT

## 2025-04-03 PROCEDURE — 85025 COMPLETE CBC W/AUTO DIFF WBC: CPT | Performed by: NURSE PRACTITIONER

## 2025-04-03 PROCEDURE — 82565 ASSAY OF CREATININE: CPT | Performed by: NURSE PRACTITIONER

## 2025-04-03 PROCEDURE — 36415 COLL VENOUS BLD VENIPUNCTURE: CPT

## 2025-04-03 RX ADMIN — CEFTRIAXONE 2000 MG: 2 INJECTION, POWDER, FOR SOLUTION INTRAMUSCULAR; INTRAVENOUS at 08:38

## 2025-04-04 ENCOUNTER — HOSPITAL ENCOUNTER (OUTPATIENT)
Dept: INFUSION THERAPY | Facility: HOSPITAL | Age: 85
Discharge: HOME | End: 2025-04-04
Payer: MEDICARE

## 2025-04-04 VITALS
RESPIRATION RATE: 20 BRPM | TEMPERATURE: 97.8 F | OXYGEN SATURATION: 100 % | SYSTOLIC BLOOD PRESSURE: 108 MMHG | HEART RATE: 68 BPM | DIASTOLIC BLOOD PRESSURE: 71 MMHG

## 2025-04-04 DIAGNOSIS — M86.9 BONE INFECTION: Primary | ICD-10-CM

## 2025-04-04 PROCEDURE — 25010000002 CEFTRIAXONE PER 250 MG: Performed by: INTERNAL MEDICINE

## 2025-04-04 PROCEDURE — 96365 THER/PROPH/DIAG IV INF INIT: CPT

## 2025-04-04 PROCEDURE — G0463 HOSPITAL OUTPT CLINIC VISIT: HCPCS

## 2025-04-04 RX ADMIN — CEFTRIAXONE 2000 MG: 2 INJECTION, POWDER, FOR SOLUTION INTRAMUSCULAR; INTRAVENOUS at 09:05

## 2025-04-05 ENCOUNTER — HOSPITAL ENCOUNTER (OUTPATIENT)
Dept: INFUSION THERAPY | Facility: HOSPITAL | Age: 85
Discharge: HOME | End: 2025-04-05
Payer: MEDICARE

## 2025-04-05 VITALS
TEMPERATURE: 97.4 F | RESPIRATION RATE: 18 BRPM | DIASTOLIC BLOOD PRESSURE: 62 MMHG | HEART RATE: 64 BPM | OXYGEN SATURATION: 95 % | SYSTOLIC BLOOD PRESSURE: 126 MMHG

## 2025-04-05 DIAGNOSIS — M86.9 BONE INFECTION: Primary | ICD-10-CM

## 2025-04-05 PROCEDURE — 96365 THER/PROPH/DIAG IV INF INIT: CPT

## 2025-04-05 PROCEDURE — 25010000002 CEFTRIAXONE PER 250 MG: Performed by: INTERNAL MEDICINE

## 2025-04-05 RX ADMIN — CEFTRIAXONE 2000 MG: 2 INJECTION, POWDER, FOR SOLUTION INTRAMUSCULAR; INTRAVENOUS at 08:18

## 2025-04-06 ENCOUNTER — HOSPITAL ENCOUNTER (OUTPATIENT)
Dept: INFUSION THERAPY | Facility: HOSPITAL | Age: 85
Discharge: HOME | End: 2025-04-06
Admitting: INTERNAL MEDICINE
Payer: MEDICARE

## 2025-04-06 VITALS
OXYGEN SATURATION: 97 % | TEMPERATURE: 98 F | HEART RATE: 73 BPM | SYSTOLIC BLOOD PRESSURE: 110 MMHG | DIASTOLIC BLOOD PRESSURE: 77 MMHG | RESPIRATION RATE: 16 BRPM

## 2025-04-06 DIAGNOSIS — M86.9 BONE INFECTION: Primary | ICD-10-CM

## 2025-04-06 PROCEDURE — 25010000002 CEFTRIAXONE PER 250 MG: Performed by: INTERNAL MEDICINE

## 2025-04-06 PROCEDURE — 96365 THER/PROPH/DIAG IV INF INIT: CPT

## 2025-04-06 RX ADMIN — CEFTRIAXONE 2000 MG: 2 INJECTION, POWDER, FOR SOLUTION INTRAMUSCULAR; INTRAVENOUS at 08:06

## 2025-04-07 ENCOUNTER — HOSPITAL ENCOUNTER (OUTPATIENT)
Dept: INFUSION THERAPY | Facility: HOSPITAL | Age: 85
Discharge: HOME | End: 2025-04-07
Admitting: INTERNAL MEDICINE
Payer: MEDICARE

## 2025-04-07 VITALS
OXYGEN SATURATION: 98 % | RESPIRATION RATE: 20 BRPM | TEMPERATURE: 97.9 F | SYSTOLIC BLOOD PRESSURE: 116 MMHG | HEART RATE: 71 BPM | DIASTOLIC BLOOD PRESSURE: 78 MMHG

## 2025-04-07 DIAGNOSIS — M86.9 BONE INFECTION: Primary | ICD-10-CM

## 2025-04-07 PROCEDURE — 25010000002 CEFTRIAXONE PER 250 MG: Performed by: INTERNAL MEDICINE

## 2025-04-07 PROCEDURE — 96365 THER/PROPH/DIAG IV INF INIT: CPT

## 2025-04-07 RX ADMIN — CEFTRIAXONE 2000 MG: 2 INJECTION, POWDER, FOR SOLUTION INTRAMUSCULAR; INTRAVENOUS at 09:15

## 2025-05-14 ENCOUNTER — LAB (OUTPATIENT)
Dept: LAB | Facility: HOSPITAL | Age: 85
End: 2025-05-14
Payer: MEDICARE

## 2025-05-14 ENCOUNTER — TRANSCRIBE ORDERS (OUTPATIENT)
Dept: ADMINISTRATIVE | Facility: HOSPITAL | Age: 85
End: 2025-05-14
Payer: MEDICARE

## 2025-05-14 DIAGNOSIS — Z96.652 PRESENCE OF LEFT ARTIFICIAL KNEE JOINT: ICD-10-CM

## 2025-05-14 DIAGNOSIS — Z96.652 PRESENCE OF LEFT ARTIFICIAL KNEE JOINT: Primary | ICD-10-CM

## 2025-05-14 LAB
CRP SERPL-MCNC: 0.84 MG/DL (ref 0–0.5)
ERYTHROCYTE [SEDIMENTATION RATE] IN BLOOD: 3 MM/HR (ref 0–20)

## 2025-05-14 PROCEDURE — 85652 RBC SED RATE AUTOMATED: CPT

## 2025-05-14 PROCEDURE — 86140 C-REACTIVE PROTEIN: CPT

## 2025-05-14 PROCEDURE — 36415 COLL VENOUS BLD VENIPUNCTURE: CPT

## 2025-08-18 ENCOUNTER — TRANSCRIBE ORDERS (OUTPATIENT)
Dept: ADMINISTRATIVE | Facility: HOSPITAL | Age: 85
End: 2025-08-18
Payer: MEDICARE

## 2025-08-18 ENCOUNTER — LAB (OUTPATIENT)
Dept: LAB | Facility: HOSPITAL | Age: 85
End: 2025-08-18
Payer: MEDICARE

## 2025-08-18 DIAGNOSIS — Z96.652 PRESENCE OF LEFT ARTIFICIAL KNEE JOINT: Primary | ICD-10-CM

## 2025-08-18 DIAGNOSIS — Z96.652 PRESENCE OF LEFT ARTIFICIAL KNEE JOINT: ICD-10-CM

## 2025-08-18 LAB
CRP SERPL-MCNC: <0.3 MG/DL (ref 0–0.5)
ERYTHROCYTE [SEDIMENTATION RATE] IN BLOOD: <1 MM/HR (ref 0–20)

## 2025-08-18 PROCEDURE — 86140 C-REACTIVE PROTEIN: CPT

## 2025-08-18 PROCEDURE — 85652 RBC SED RATE AUTOMATED: CPT

## 2025-08-18 PROCEDURE — 36415 COLL VENOUS BLD VENIPUNCTURE: CPT

## (undated) DEVICE — INTENDED TO SUPPORT AND MAINTAIN THE POSITION OF AN ANESTHETIZED PATIENT DURING SURGERY: Brand: HERMANTOR XL PINK KNEE POSITIONING PAD

## (undated) DEVICE — SOL ISO/ALC 70PCT 4OZ

## (undated) DEVICE — PK KN TOTL 40

## (undated) DEVICE — DRAPE,U/ SHT,SPLIT,PLAS,STERIL: Brand: MEDLINE

## (undated) DEVICE — ANTIBACTERIAL UNDYED BRAIDED (POLYGLACTIN 910), SYNTHETIC ABSORBABLE SUTURE: Brand: COATED VICRYL

## (undated) DEVICE — 3M™ IOBAN™ 2 ANTIMICROBIAL INCISE DRAPE 6650EZ: Brand: IOBAN™ 2

## (undated) DEVICE — GLV SURG SIGNATURE ESSENTIAL PF LTX SZ8

## (undated) DEVICE — MAT FLR ABSORBENT LG 4FT 10 2.5FT

## (undated) DEVICE — SYS IRR SURGIPHOR A/MIC RTU BO PVPI 450ML STRL

## (undated) DEVICE — DRSNG SLVR/ANTIBAC PRIMASEAL POST/OP ADHS 3.5X10IN

## (undated) DEVICE — BNDG,ELSTC,MATRIX,STRL,4"X5YD,LF,HOOK&LP: Brand: MEDLINE

## (undated) DEVICE — GLV SURG BIOGEL LTX PF 8

## (undated) DEVICE — SOL WND BACTISURE LAVG 1L

## (undated) DEVICE — THE STERILE CAMERA HANDLE COVER IS FOR USE WITH THE STERIS SURGICAL LIGHTING AND VISUALIZATION SYSTEMS.

## (undated) DEVICE — DUAL CUT SAGITTAL BLADE

## (undated) DEVICE — DRSNG SLVR/ANTIBAC PRIMASEAL POST/OP ADHS 3.5X12IN

## (undated) DEVICE — DRSNG SURESITE WNDW 4X4.5

## (undated) DEVICE — DISPOSABLE TOURNIQUET CUFF 30"X4", 1-LINE, WHITE, STERILE, 1EA/PK, 10PK/CS: Brand: ASP MEDICAL

## (undated) DEVICE — YANKAUER,BULB TIP,W/O VENT,RIGID,STERILE: Brand: MEDLINE

## (undated) DEVICE — SUT ETHIB 1 CT1 30IN  X425H

## (undated) DEVICE — THE STERILE LIGHT HANDLE COVER IS USED WITH STERIS SURGICAL LIGHTING AND VISUALIZATION SYSTEMS.

## (undated) DEVICE — GLV SURG SENSICARE PI PF LF 8.5 GRN STRL

## (undated) DEVICE — APPL CHLORAPREP HI/LITE 26ML ORNG

## (undated) DEVICE — CULT AER/ANAEROB FASTIDIOUS BACT

## (undated) DEVICE — NEEDLE, QUINCKE, 20GX3.5": Brand: MEDLINE

## (undated) DEVICE — DECANTER BAG 9": Brand: MEDLINE INDUSTRIES, INC.

## (undated) DEVICE — SYR LUERLOK 30CC

## (undated) DEVICE — GLV SURG BIOGEL LTX PF 8 1/2

## (undated) DEVICE — NEEDLE, QUINCKE, 18GX3.5": Brand: MEDLINE

## (undated) DEVICE — DECANTER: Brand: UNBRANDED

## (undated) DEVICE — PK TOTL KN 50

## (undated) DEVICE — SOL IRR NACL 0.9PCT 3000ML

## (undated) DEVICE — DRSNG TELFA PAD NONADH STR 1S 3X4IN

## (undated) DEVICE — 3M™ IOBAN™ 2 ANTIMICROBIAL INCISE DRAPE 6640EZ: Brand: IOBAN™ 2

## (undated) DEVICE — KT DRN EVAC WND PVC PCH WTROC RND 10F400

## (undated) DEVICE — STRAP STIRUP SLP RNG 19X3.5IN DISP

## (undated) DEVICE — GLV SURG PREMIERPRO ORTHO LTX PF SZ8 BRN

## (undated) DEVICE — PREP SOL POVIDONE/IODINE BT 4OZ

## (undated) DEVICE — RECIPROCATING BLADE, DOUBLE SIDED, OFFSET  (70.0 X 0.64 X 12.6MM)

## (undated) DEVICE — ADHS SKIN SURG TISS VISC PREMIERPRO EXOFIN HI/VISC 1ML

## (undated) DEVICE — PROXIMATE RH ROTATING HEAD SKIN STAPLERS (35 WIDE) CONTAINS 35 STAINLESS STEEL STAPLES: Brand: PROXIMATE

## (undated) DEVICE — PATIENT RETURN ELECTRODE, SINGLE-USE, CONTACT QUALITY MONITORING, ADULT, WITH 9FT CORD, FOR PATIENTS WEIGING OVER 33LBS. (15KG): Brand: MEGADYNE